# Patient Record
Sex: FEMALE | Race: WHITE | Employment: OTHER
[De-identification: names, ages, dates, MRNs, and addresses within clinical notes are randomized per-mention and may not be internally consistent; named-entity substitution may affect disease eponyms.]

---

## 2017-09-03 ENCOUNTER — HEALTH MAINTENANCE LETTER (OUTPATIENT)
Age: 57
End: 2017-09-03

## 2019-03-06 ENCOUNTER — HOSPITAL ENCOUNTER (EMERGENCY)
Facility: OTHER | Age: 59
Discharge: HOME | End: 2019-03-06
Attending: FAMILY MEDICINE | Admitting: FAMILY MEDICINE
Payer: COMMERCIAL

## 2019-03-06 VITALS
RESPIRATION RATE: 14 BRPM | OXYGEN SATURATION: 97 % | DIASTOLIC BLOOD PRESSURE: 65 MMHG | BODY MASS INDEX: 23.54 KG/M2 | HEART RATE: 90 BPM | TEMPERATURE: 97.6 F | HEIGHT: 67 IN | WEIGHT: 150 LBS | SYSTOLIC BLOOD PRESSURE: 128 MMHG

## 2019-03-06 DIAGNOSIS — R73.9 HYPERGLYCEMIA: ICD-10-CM

## 2019-03-06 LAB
ALBUMIN SERPL-MCNC: 4.1 G/DL (ref 3.5–5.7)
ALBUMIN UR-MCNC: NEGATIVE MG/DL
ALP SERPL-CCNC: 116 U/L (ref 34–104)
ALT SERPL W P-5'-P-CCNC: 56 U/L (ref 7–52)
ANION GAP SERPL CALCULATED.3IONS-SCNC: 4 MMOL/L (ref 3–14)
APPEARANCE UR: CLEAR
AST SERPL W P-5'-P-CCNC: 40 U/L (ref 13–39)
BASOPHILS # BLD AUTO: 0 10E9/L (ref 0–0.2)
BASOPHILS NFR BLD AUTO: 0.5 %
BILIRUB SERPL-MCNC: 0.9 MG/DL (ref 0.3–1)
BILIRUB UR QL STRIP: NEGATIVE
BUN SERPL-MCNC: 26 MG/DL (ref 7–25)
CALCIUM SERPL-MCNC: 9.1 MG/DL (ref 8.6–10.3)
CHLORIDE SERPL-SCNC: 99 MMOL/L (ref 98–107)
CO2 SERPL-SCNC: 26 MMOL/L (ref 21–31)
COLOR UR AUTO: YELLOW
CREAT SERPL-MCNC: 1.08 MG/DL (ref 0.6–1.2)
DIFFERENTIAL METHOD BLD: NORMAL
EOSINOPHIL # BLD AUTO: 0 10E9/L (ref 0–0.7)
EOSINOPHIL NFR BLD AUTO: 0.4 %
ERYTHROCYTE [DISTWIDTH] IN BLOOD BY AUTOMATED COUNT: 12.3 % (ref 10–15)
GFR SERPL CREATININE-BSD FRML MDRD: 52 ML/MIN/{1.73_M2}
GLUCOSE SERPL-MCNC: 747 MG/DL (ref 70–105)
GLUCOSE UR STRIP-MCNC: >1000 MG/DL
HCO3 BLDV-SCNC: 24 MMOL/L (ref 21–28)
HCT VFR BLD AUTO: 35.9 % (ref 35–47)
HGB BLD-MCNC: 12.5 G/DL (ref 11.7–15.7)
HGB UR QL STRIP: ABNORMAL
IMM GRANULOCYTES # BLD: 0 10E9/L (ref 0–0.4)
IMM GRANULOCYTES NFR BLD: 0.3 %
KETONES UR STRIP-MCNC: 15 MG/DL
LEUKOCYTE ESTERASE UR QL STRIP: NEGATIVE
LYMPHOCYTES # BLD AUTO: 1.3 10E9/L (ref 0.8–5.3)
LYMPHOCYTES NFR BLD AUTO: 16.8 %
MCH RBC QN AUTO: 32.1 PG (ref 26.5–33)
MCHC RBC AUTO-ENTMCNC: 34.8 G/DL (ref 31.5–36.5)
MCV RBC AUTO: 92 FL (ref 78–100)
MONOCYTES # BLD AUTO: 0.5 10E9/L (ref 0–1.3)
MONOCYTES NFR BLD AUTO: 6.6 %
NEUTROPHILS # BLD AUTO: 5.7 10E9/L (ref 1.6–8.3)
NEUTROPHILS NFR BLD AUTO: 75.4 %
NITRATE UR QL: NEGATIVE
O2/TOTAL GAS SETTING VFR VENT: ABNORMAL %
OXYHGB MFR BLDV: 86 %
PCO2 BLDV: 39 MM HG (ref 40–50)
PH BLDV: 7.41 PH (ref 7.32–7.43)
PH UR STRIP: 6 PH (ref 5–9)
PLATELET # BLD AUTO: 194 10E9/L (ref 150–450)
PO2 BLDV: 71 MM HG (ref 25–47)
POTASSIUM SERPL-SCNC: 4.9 MMOL/L (ref 3.5–5.1)
PROT SERPL-MCNC: 6.7 G/DL (ref 6.4–8.9)
RBC # BLD AUTO: 3.9 10E12/L (ref 3.8–5.2)
RBC #/AREA URNS AUTO: NORMAL /HPF
SODIUM SERPL-SCNC: 129 MMOL/L (ref 134–144)
SOURCE: ABNORMAL
SP GR UR STRIP: 1.01 (ref 1–1.03)
UROBILINOGEN UR STRIP-ACNC: 0.2 EU/DL (ref 0.2–1)
WBC # BLD AUTO: 7.5 10E9/L (ref 4–11)
WBC #/AREA URNS AUTO: NORMAL /HPF

## 2019-03-06 PROCEDURE — 85025 COMPLETE CBC W/AUTO DIFF WBC: CPT | Performed by: FAMILY MEDICINE

## 2019-03-06 PROCEDURE — 36415 COLL VENOUS BLD VENIPUNCTURE: CPT | Performed by: FAMILY MEDICINE

## 2019-03-06 PROCEDURE — 25000128 H RX IP 250 OP 636: Performed by: FAMILY MEDICINE

## 2019-03-06 PROCEDURE — 25800030 ZZH RX IP 258 OP 636: Performed by: FAMILY MEDICINE

## 2019-03-06 PROCEDURE — 99283 EMERGENCY DEPT VISIT LOW MDM: CPT | Mod: Z6 | Performed by: FAMILY MEDICINE

## 2019-03-06 PROCEDURE — 81001 URINALYSIS AUTO W/SCOPE: CPT | Performed by: FAMILY MEDICINE

## 2019-03-06 PROCEDURE — 99283 EMERGENCY DEPT VISIT LOW MDM: CPT | Performed by: FAMILY MEDICINE

## 2019-03-06 PROCEDURE — 82805 BLOOD GASES W/O2 SATURATION: CPT | Performed by: FAMILY MEDICINE

## 2019-03-06 PROCEDURE — 99284 EMERGENCY DEPT VISIT MOD MDM: CPT | Mod: 25 | Performed by: FAMILY MEDICINE

## 2019-03-06 PROCEDURE — 96374 THER/PROPH/DIAG INJ IV PUSH: CPT | Performed by: FAMILY MEDICINE

## 2019-03-06 PROCEDURE — 80053 COMPREHEN METABOLIC PANEL: CPT | Performed by: FAMILY MEDICINE

## 2019-03-06 PROCEDURE — 25000131 ZZH RX MED GY IP 250 OP 636 PS 637: Performed by: FAMILY MEDICINE

## 2019-03-06 RX ORDER — FERROUS SULFATE 325(65) MG
325 TABLET ORAL 2 TIMES DAILY
COMMUNITY

## 2019-03-06 RX ORDER — CALCIUM CARBONATE 500 MG/1
1 TABLET, CHEWABLE ORAL 2 TIMES DAILY
COMMUNITY
End: 2021-01-11

## 2019-03-06 RX ORDER — OMEPRAZOLE 40 MG/1
40 CAPSULE, DELAYED RELEASE ORAL 2 TIMES DAILY
COMMUNITY
End: 2021-01-11

## 2019-03-06 RX ORDER — SODIUM CHLORIDE 9 MG/ML
1000 INJECTION, SOLUTION INTRAVENOUS CONTINUOUS
Status: DISCONTINUED | OUTPATIENT
Start: 2019-03-06 | End: 2019-03-06 | Stop reason: HOSPADM

## 2019-03-06 RX ORDER — DEXTROSE MONOHYDRATE 25 G/50ML
25-50 INJECTION, SOLUTION INTRAVENOUS
Status: DISCONTINUED | OUTPATIENT
Start: 2019-03-06 | End: 2019-03-06 | Stop reason: HOSPADM

## 2019-03-06 RX ORDER — NICOTINE POLACRILEX 4 MG
15-30 LOZENGE BUCCAL
Status: DISCONTINUED | OUTPATIENT
Start: 2019-03-06 | End: 2019-03-06 | Stop reason: HOSPADM

## 2019-03-06 RX ORDER — QUETIAPINE FUMARATE 25 MG/1
25 TABLET, FILM COATED ORAL 2 TIMES DAILY
COMMUNITY
End: 2021-01-11 | Stop reason: DRUGHIGH

## 2019-03-06 RX ORDER — MIRTAZAPINE 15 MG/1
7.5 TABLET, FILM COATED ORAL AT BEDTIME
COMMUNITY
End: 2021-01-11 | Stop reason: DRUGHIGH

## 2019-03-06 RX ORDER — CHOLECALCIFEROL (VITAMIN D3) 50 MCG
1 TABLET ORAL DAILY
COMMUNITY

## 2019-03-06 RX ORDER — LEVOTHYROXINE SODIUM 137 UG/1
137 TABLET ORAL DAILY
COMMUNITY
End: 2020-12-22

## 2019-03-06 RX ADMIN — SODIUM CHLORIDE 1000 ML: 9 INJECTION, SOLUTION INTRAVENOUS at 14:11

## 2019-03-06 RX ADMIN — INSULIN HUMAN 15 UNITS: 100 INJECTION, SOLUTION PARENTERAL at 14:25

## 2019-03-06 RX ADMIN — SODIUM CHLORIDE 1000 ML: 900 INJECTION, SOLUTION INTRAVENOUS at 15:17

## 2019-03-06 ASSESSMENT — MIFFLIN-ST. JEOR: SCORE: 1298.03

## 2019-03-06 NOTE — ED NOTES
Pt states that she is feeling better, VSS, pt back to detox, vebalizes understanding of discharge instructions

## 2019-03-06 NOTE — ED AVS SNAPSHOT
[facility]  [address]  [place] [address]  Phone:  [phone]  Fax:  [phone]                                    Funmilayo Stratton   MRN: [id number]    Department:  [facility]   Date of Visit:  3/6/2019           After Visit Summary Signature Page    I have received my discharge instructions, and my questions have been answered. I have discussed any challenges I see with this plan with the nurse or doctor.    ..........................................................................................................................................  Patient/Patient Representative Signature      ..........................................................................................................................................  Patient Representative Print Name and Relationship to Patient    ..................................................               ................................................  Date                                   Time    ..........................................................................................................................................  Reviewed by Signature/Title    ...................................................              ..............................................  Date                                               Time          22EPIC Rev 08/18

## 2019-03-06 NOTE — ED PROVIDER NOTES
History     Chief Complaint   Patient presents with     Hyperglycemia     HPI  Funmilayo Stratton is a 59 year old female who just went to outpatient detox at [facility] yesterday.      She takes daily Lantus and then a sliding scale depending on her carbohydrate loads throughout the day, but states that she has been 'busy' at detox and hasn't been compliant with her insulin today.    Staff noted a BS of over 500, and she was sent here.    She doesn't feel particularly bad, is somewhat thirsty, but no increased respirations, nausea, vomiting, etc.    She hasn't been otherwise unwell.    Allergies:  Allergies   Allergen Reactions     Sulfa Drugs Rash     Vicodin [Hydrocodone-Acetaminophen] Rash       Problem List:    Patient Active Problem List    Diagnosis Date Noted     HYPERLIPIDEMIA LDL GOAL <100 05/09/2010     Priority: Medium     TYPE 2 DIABETES, HBA1C GOAL < 7% 05/02/2010     Priority: Medium     Vitamin D deficiency 09/21/2008     Priority: Medium     (Problem list name updated by automated process. Provider to review and confirm.)       Allergic rhinitis 05/06/2008     Priority: Medium     Problem list name updated by automated process. Provider to review       Esophageal reflux 01/21/2008     Priority: Medium     Tobacco use disorder 11/15/2007     Priority: Medium     Pure hypercholesterolemia 10/17/2006     Priority: Medium     Episodic mood disorder (H) 09/05/2006     Priority: Medium     Problem list name updated by automated process. Provider to review       Diabetes mellitus, type 2 (H) 06/14/2006     Priority: Medium     Problem list name updated by automated process. Provider to review       Essential hypertension 06/14/2006     Priority: Medium     Problem list name updated by automated process. Provider to review       Hypothyroidism 06/14/2006     Priority: Medium     Problem list name updated by automated process. Provider to review          Past Medical History:    Past Medical History:  "  Diagnosis Date     DIABETES MELLITUS TYPE II-UNCOMPL 6/14/2006     HYPERTENSION NOS 6/14/2006     HYPOTHYROIDISM NOS 6/14/2006     Vitamin D deficiencies 9/21/2008       Past Surgical History:    History reviewed. No pertinent surgical history.    Family History:    Family History   Problem Relation Age of Onset     Hypertension Mother      Diabetes Mother      Asthma Father      Diabetes Father      Hypertension Father      Cerebrovascular Disease Father      Circulatory Father      Eye Disorder Father        Social History:  Marital Status:   [4]  Social History     Tobacco Use     Smoking status: Current Every Day Smoker     Packs/day: 1.00     Last attempt to quit: 8/18/2008     Years since quitting: 10.5     Smokeless tobacco: Never Used   Substance Use Topics     Alcohol use: Yes     Comment: 1 liter of whiskey per day, last drank at 1100 3/5/2019     Drug use: No        Medications:      ACYCLOVIR 400 MG OR TABS   ASPIRIN 81 MG OR TABS   calcium carbonate (TUMS) 500 MG chewable tablet   CLARITIN-D 24 HOUR#  MG OR TB24   ferrous sulfate (FEROSUL) 325 (65 Fe) MG tablet   fluticasone (VERAMYST) 27.5 MCG/SPRAY nasal spray   LANTUS SOLOSTAR 100 UNIT/ML SC SOLN   levothyroxine (SYNTHROID/LEVOTHROID) 137 MCG tablet   LISINOPRIL 10 MG OR TABS   mirtazapine (REMERON) 15 MG tablet   MULTIVITAMIN/IRON OR TABS   NOVOLOG FLEXPEN 100 UNIT/ML SC SOLN   omeprazole (PRILOSEC) 40 MG DR capsule   QUEtiapine (SEROQUEL) 25 MG tablet   vitamin D3 (CHOLECALCIFEROL) 2000 units tablet   BD U/F III SHORT PEN NEEDLE 31G X 8 MM MISC   ONE TOUCH ULTRA BONUS PACK STRP   VI   ONETOUCH ULTRASOFT LANCETS MISC         Review of Systems  Denies recent fevers, cough, runny nose, abdominal concerns  Physical Exam   BP: 175/76  Pulse: 87  Heart Rate: 82  Temp: 97.6  F (36.4  C)  Resp: 12  Height: 170.2 cm (5' 7\")  Weight: 68 kg (150 lb)  SpO2: 98 %      Physical Examwell woman.  mentates clearly.  Does not look toxic or septic.  " OP slightly dry but not overwhelmingly so.  Heart reg.  No tachycardia.  Lungs clear.  abd soft, NT, ND.    .  However, her VBG is very reassuring.  She does not appear acutely ill regarding her hyperglycemia.    She is given one liter of NS and 15 units of regular insulin, and then her BS was down to 410.  I feel she does not need admission, as she has no constitutional symptoms, and can be managed at [facility] by adhering to her usual daily insulin regimen.  She understands she will have to use her insulin as directed and follow her sliding scale.  I feel she is mentally capable of doing so.    Return to the ER with consistently higher Blood sugars, or developing constitutional symptoms.    ED Course        Procedures               Critical Care time:  none               Results for orders placed or performed during the hospital encounter of 03/06/19 (from the past 24 hour(s))   *UA reflex to Microscopic   Result Value Ref Range    Color Urine Yellow     Appearance Urine Clear     Glucose Urine >1000 (A) NEG^Negative mg/dL    Bilirubin Urine Negative NEG^Negative    Ketones Urine 15 (A) NEG^Negative mg/dL    Specific Gravity Urine 1.010 1.000 - 1.030    Blood Urine Trace (A) NEG^Negative    pH Urine 6.0 5.0 - 9.0 pH    Protein Albumin Urine Negative NEG^Negative mg/dL    Urobilinogen Urine 0.2 0.2 - 1.0 EU/dL    Nitrite Urine Negative NEG^Negative    Leukocyte Esterase Urine Negative NEG^Negative    Source Midstream Urine    Urine Microscopic   Result Value Ref Range    WBC Urine 0 - 5 OTO5^0 - 5 /HPF    RBC Urine O - 2 OTO2^O - 2 /HPF   CBC with platelets differential   Result Value Ref Range    WBC 7.5 4.0 - 11.0 10e9/L    RBC Count 3.90 3.8 - 5.2 10e12/L    Hemoglobin 12.5 11.7 - 15.7 g/dL    Hematocrit 35.9 35.0 - 47.0 %    MCV 92 78 - 100 fl    MCH 32.1 26.5 - 33.0 pg    MCHC 34.8 31.5 - 36.5 g/dL    RDW 12.3 10.0 - 15.0 %    Platelet Count 194 150 - 450 10e9/L    Diff Method Automated Method     %  Neutrophils 75.4 %    % Lymphocytes 16.8 %    % Monocytes 6.6 %    % Eosinophils 0.4 %    % Basophils 0.5 %    % Immature Granulocytes 0.3 %    Absolute Neutrophil 5.7 1.6 - 8.3 10e9/L    Absolute Lymphocytes 1.3 0.8 - 5.3 10e9/L    Absolute Monocytes 0.5 0.0 - 1.3 10e9/L    Absolute Eosinophils 0.0 0.0 - 0.7 10e9/L    Absolute Basophils 0.0 0.0 - 0.2 10e9/L    Abs Immature Granulocytes 0.0 0 - 0.4 10e9/L   Comprehensive metabolic panel   Result Value Ref Range    Sodium 129 (L) 134 - 144 mmol/L    Potassium 4.9 3.5 - 5.1 mmol/L    Chloride 99 98 - 107 mmol/L    Carbon Dioxide 26 21 - 31 mmol/L    Anion Gap 4 3 - 14 mmol/L    Glucose 747 (HH) 70 - 105 mg/dL    Urea Nitrogen 26 (H) 7 - 25 mg/dL    Creatinine 1.08 0.60 - 1.20 mg/dL    GFR Estimate 52 (L) >60 mL/min/[1.73_m2]    GFR Estimate If Black 63 >60 mL/min/[1.73_m2]    Calcium 9.1 8.6 - 10.3 mg/dL    Bilirubin Total 0.9 0.3 - 1.0 mg/dL    Albumin 4.1 3.5 - 5.7 g/dL    Protein Total 6.7 6.4 - 8.9 g/dL    Alkaline Phosphatase 116 (H) 34 - 104 U/L    ALT 56 (H) 7 - 52 U/L    AST 40 (H) 13 - 39 U/L   Blood gas venous and oxyhgb   Result Value Ref Range    Ph Venous 7.41 7.32 - 7.43 pH    PCO2 Venous 39 (L) 40 - 50 mm Hg    PO2 Venous 71 (H) 25 - 47 mm Hg    Bicarbonate Venous 24 21 - 28 mmol/L    FIO2 Unknown     Oxyhemoglobin Venous 86 %       Medications   0.9% sodium chloride BOLUS (0 mLs Intravenous Stopped 3/6/19 1517)     Followed by   sodium chloride 0.9% infusion (1,000 mLs Intravenous New Bag 3/6/19 1517)   glucose gel 15-30 g (not administered)     Or   dextrose 50 % injection 25-50 mL (not administered)     Or   glucagon injection 1 mg (not administered)   insulin (regular) (HumuLIN R/NovoLIN R) injection 15 Units (15 Units Intravenous Given 3/6/19 7664)       Assessments & Plan (with Medical Decision Making)     I have reviewed the nursing notes.    I have reviewed the findings, diagnosis, plan and need for follow up with the patient.   as  above    [facility] wanted some guidelines for sliding scale.  I wrote down the standard medium resistance dosing with Novolog.  They should address further questions to Funmilayo's primary doctor's clinic for further instructions.     Discharge BS was 290.        Medication List      There are no discharge medications for this visit.         Final diagnoses:   Hyperglycemia       3/6/2019   [facility]     Surendra Flores MD  03/06/19 1532       Surendra Flores MD  03/06/19 1544       Surendra Flores MD  03/06/19 1543

## 2019-03-06 NOTE — ED TRIAGE NOTES
"ED Nursing Triage Note (General)   ________________________________    Funmilayo Stratton is a 59 year old Female that presents to triage private car  With history of  Admitted to [facility] last evening for ETOH detox, is diabetic and blood sugars are reading HIGH on her monitor, here her BG is greater than 500, denies pain and feels her levels are due to ETOH, last drink was yesterday at 1100, usually drinks a Liter of whiskey per day, reported by patient   Significant symptoms had onset today.   /76   Pulse 87   Temp 97.6  F (36.4  C) (Tympanic)   Resp 12   Ht 1.702 m (5' 7\")   Wt 68 kg (150 lb)   LMP 06/26/2008   SpO2 98%   Breastfeeding? No   BMI 23.49 kg/m  t  Patient appears alert , in no acute distress., and cooperative behavior.    GCS 14  Airway: intact  Breathing noted as Normal.  Circulation Normal  Skin pale and cool  Action taken:  Triage to critical care immediately      [facility] lives with dtr in [place], but is an inpt at [facility] for ETOH detox.  "

## 2019-06-21 ENCOUNTER — TRANSFERRED RECORDS (OUTPATIENT)
Dept: MULTI SPECIALTY CLINIC | Facility: CLINIC | Age: 59
End: 2019-06-21
Payer: COMMERCIAL

## 2019-11-07 ENCOUNTER — HEALTH MAINTENANCE LETTER (OUTPATIENT)
Age: 59
End: 2019-11-07

## 2020-02-23 ENCOUNTER — HEALTH MAINTENANCE LETTER (OUTPATIENT)
Age: 60
End: 2020-02-23

## 2020-11-17 ENCOUNTER — TELEPHONE (OUTPATIENT)
Dept: INTERNAL MEDICINE | Facility: OTHER | Age: 60
End: 2020-11-17

## 2020-11-17 NOTE — TELEPHONE ENCOUNTER
Called Krystina at [facility] and verified name and date of birth of patient.     Most recent blood glucose was 210 at 2:45 pm today. Notified the nurse that Dr. Cee has never seen the patient. They report they will continue to monitor.  Laurie Ingram LPN on 11/17/2020 at 2:51 PM

## 2020-11-17 NOTE — TELEPHONE ENCOUNTER
Krystina from [facility] calling and states that patients Blood sugar today at 7 am : 435 and 11 am : 465    Krystina states that patient is taking Basaglar 4 units every AM and Novolog just sliding scale which was given.    No oral diabetic medications.    Krystina denies patient having any symptoms.  Krystina looking for direction how to proceed with blood sugar.    Liza Chi RN on 11/17/2020 at 11:17 AM

## 2020-11-19 ENCOUNTER — NURSING HOME VISIT (OUTPATIENT)
Dept: GERIATRICS | Facility: OTHER | Age: 60
End: 2020-11-19
Attending: NURSE PRACTITIONER
Payer: MEDICAID

## 2020-11-19 DIAGNOSIS — J18.9 PNEUMONIA OF LEFT LOWER LOBE DUE TO INFECTIOUS ORGANISM: Primary | ICD-10-CM

## 2020-11-19 PROCEDURE — 99310 SBSQ NF CARE HIGH MDM 45: CPT | Performed by: NURSE PRACTITIONER

## 2020-11-23 ENCOUNTER — RESULTS ONLY (OUTPATIENT)
Dept: LAB | Age: 60
End: 2020-11-23

## 2020-11-23 ENCOUNTER — MEDICAL CORRESPONDENCE (OUTPATIENT)
Dept: HEALTH INFORMATION MANAGEMENT | Facility: OTHER | Age: 60
End: 2020-11-23

## 2020-11-23 ENCOUNTER — NURSING HOME VISIT (OUTPATIENT)
Dept: GERIATRICS | Facility: OTHER | Age: 60
End: 2020-11-23
Attending: NURSE PRACTITIONER
Payer: MEDICAID

## 2020-11-23 ENCOUNTER — NURSE TRIAGE (OUTPATIENT)
Dept: INTERNAL MEDICINE | Facility: OTHER | Age: 60
End: 2020-11-23

## 2020-11-23 DIAGNOSIS — R73.9 HYPERGLYCEMIA: Primary | ICD-10-CM

## 2020-11-23 PROCEDURE — 99309 SBSQ NF CARE MODERATE MDM 30: CPT | Performed by: NURSE PRACTITIONER

## 2020-11-23 NOTE — TELEPHONE ENCOUNTER
"Called Lissa and reported per last phone not on 11/17/2020 per Dr. Cee \" I Have never seen patient\"    Krystina at [facility] was notified.  Lissa will contact patients PCP .    Liza Chi RN on 11/23/2020 at 8:25 AM    "

## 2020-11-23 NOTE — PROGRESS NOTES
Visit Date:   11/19/2020      CHIEF COMPLAINT:  Initial nurse practitioner evaluation.      HISTORY OF PRESENT ILLNESS:  The patient was admitted to skilled nursing facility on 11/13 from [facility] in [place], [place].  Her hospital stay was from 11/04 through 11/13.  She was admitted with nausea, weakness, pressure ulcers of both heels and an infected diabetic ulcer on the dorsal surface of the right foot.  She was found to have severe hyperglycemia with a glucose greater than 500 that was thought secondary to dehydration.  She had left lower lobe pneumonia and also found to have a macrocytic anemia.  Laboratory studies during hospitalization, as reported on discharge summary, included a normal B12 and folate level, increased ferritin thought to be reactive, low iron and low TIBC.  Hemoccults were presumptive positive according to note, peripheral smear was pending at that time.  She was treated with IV antibiotics and also switched to oral Flagyl and Levaquin.  These ended by the time she discharged.  Her sugars were stable with insulin, but she did need some down adjustments due to hypoglycemia.  She has history of Gabbie-en-Y gastric bypass about 10 years ago and is followed by Dr. Gallo in [place].  She tells me that she saw him this past summer and had exploratory surgery which found a hernia and also some scar tissue.  She was not aware of any anemia issues.  She cannot recall the last time she had EGD or colonoscopy.  At the time of discharge, it was recommended that the patient have skilled nursing facility care to help with strengthening, wound care and should consider outpatient endoscopy to evaluate the anemia.  The patient tells me today that she is planning to follow with Dr. Abhinav Jones in [place] who she recently established care.  She plans to follow up with Dr. Jones after skilled nursing facility discharge.        The patient tells me that she is feeling better.  She is less short of  breath.  She still has chronic swelling in her legs.  She had that prior to hospital admission and was treated with Lasix.  She thinks the wounds on her heels and the top of her right foot are improving.  The infection in the foot has resolved.  She is hoping to get stronger and to discharge to an assisted living rather than back to her home where her daughter had been her PCA.      PAST MEDICAL HISTORY, PAST SURGICAL HISTORY, ALLERGIES, MEDICATIONS, RISK FACTORS, SOCIAL HISTORY:  All reviewed.      ADVANCE DIRECTIVE:  Do not resuscitate.      REVIEW OF SYSTEMS:  A 12-point complete review of systems discussed with nursing staff and resident.  See HPI for positive findings, otherwise unremarkable.      PHYSICAL EXAMINATION:   VITAL SIGNS:  Blood pressure 139/79, pulse 67, respiratory rate 18, temperature 98.1, SPO2 of 98% on room air.   Sugars range from 115-255.  She did have an outlier of 465 blood sugar, but that was because she could not sleep at night and was up snacking and getting food out of the vending machine.   GENERAL:  Pleasant female in no acute distress.  Affect normal.  Alert and oriented x 4.   SKIN:  Color pink.   HEENT:  Mucous membranes moist.   NECK:  Supple without adenopathy.   LUNGS:  Fields with faint wheeze at the right base, otherwise clear.   CARDIOVASCULAR:  Regular rate and rhythm with no murmur or S3 auscultated.   ABDOMEN:  Soft and without masses, tenderness and organomegaly.   EXTREMITIES:  Bilateral extremities with 2+ edema to above her knees.  She has a wound at the dorsal surface of the right foot that has 75% yellow slough and 25% light pink granulation tissue.  Periwound tissue is without erythema, warmth or maceration.  There is a small amount of drainage from the wound that is not odorous and nonpurulent.  There is no surrounding erythema, warmth or maceration.  There is a small amount of nonpurulent serosanguineous drainage from each wound.  Bilateral heel ulcers, both are  covered with yellow slough.  Measurements of all these wounds have been obtained by nursing staff and documented.  The patient also has a cut on the side of the right leg.  This is covered in yellow slough as well.  She states that this is not covered today, but it has been covered with an ABD pad since she has been here.  No evidence of infection.      LABORATORY DATA:  Emergency department labs prior to hospital admission:  Hemoglobin 9.2 g/dL, platelet 261,000.      ASSESSMENT:   1.  Left lower lobe pneumonia.   2.  Type 1 diabetes.   3.  Diabetic ulcer, right foot.   4.  Diabetic ulcer wound infection.   5.  Bilateral unstageable pressure ulcers.   6.  Right lateral calf traumatic ulcer.   7.  History of Gabbie-en-Y bypass.   8.  Macrocytic anemia.   9.  Hypothyroidism.      PLAN:  We will follow-up on some labs to check CBC, TSH, free T4, basic metabolic panel next lab day.  As long as things are stable will hold off on getting any panendoscopy until she has discharged from skilled nursing facility.  She was to follow with Dr. Gallo as he has managed her gastric issues in the past.         HARRIET QUEZADA NP             D: 2020   T: 2020   MT: CARRILLO      Name:     GAEL BARRIENTOS   MRN:      [id number]        Account:      [id number]   :      1960           Visit Date:   2020      Document: [id number]       cc: Maximiliano Cee MD       [facility]

## 2020-11-25 NOTE — PROGRESS NOTES
Visit Date:   11/23/2020      CHIEF COMPLAINT:  Increased redness, diabetic foot ulcer, and increased blood sugars.      HISTORY OF PRESENT ILLNESS:  The patient has had blood sugars ranging from 115-450.  She currently is on insulin Basaglar 4 units at 7:00 a.m. and sliding scale insulin.  The sliding scale insulin ranges from 150-350 and gets between 2 and 10 units depending upon the range.  It is recommended that MD be notified with blood sugar is greater than 350.  They have been needing to do this.  The patient tells me that she has been up snacking at nighttime.  By reviewing her hospital discharge summary, the hospitalist had reported in her note that patient will be discharged on insulin Basaglar 4 units in the morning and 2 units at bedtime.  She tells me that her usual dose at home was 4 units twice daily.  She actually is only on insulin Basaglar 4 units in the morning.  She also is concerned because she is having more pain in both of her feet.  She has been wearing Ace wraps to control the edema.  She has ulcers on both heels from pressure related injuries that are healing nicely.  She also has a traumatic ulcer on the side of her right leg that is now healing.  Nursing staff are concerned because there is more redness around the diabetic ulcer along the top of her right foot.  The patient states this has been more painful.  She is prescribed oxycodone 5 mg every 6 hours, but by reviewing her medication list has actually been taking this a couple of times daily for the past 3 days.  She states that she is having increased pain and would like something stronger for pain.  While hospitalized, she was treated for pneumonia.  I am not seeing any evidence of wound infections.  SHE DOES HAVE AN ALLERGY TO BACTRIM.  She has been afebrile.      PAST MEDICAL HISTORY, ALLERGIES, MEDICATIONS:  Reviewed.      REVIEW OF SYSTEMS:  See HPI.      PHYSICAL EXAMINATION:   VITAL SIGNS:   Blood pressure 110/62, pulse 90,  respiratory rate 18, temperature 97.1.     GENERAL:  Pleasant female who is sitting on the edge of her bed.  She did need assistance to lie down in bed.  She answers questions appropriately.  She seems to be more depressed today.  She is followed by Psychiatry.  She does have bipolar disorder.   LUNGS:  Fields clear to auscultation.   CARDIOVASCULAR:  Regular.   EXTREMITIES:  Bilateral extremities with 1-2+ edema.  DP and PT are palpable bilaterally.  The ulceration along the dorsal surface of the right foot does show improvement of the wound bed and there is less slough and more granulation tissue, but there is more erythema along the base of the wound.  There is no warmth with palpation to this area.  She also has a small new blister along the medial aspect of the great toe that has not ruptured.      ASSESSMENT:   1.  Type 1 diabetes.   2.  Diabetic foot ulcer, right foot.   3.  Hyperglycemia.   4.  Cellulitis, right foot.      PLAN:   1.  We will increase her insulin glargine to 4 units in the morning and 2 units at bedtime.  We will change her sliding scale to give insulin aspart 150-199 give 2 units, 200-249 give 4 units, 250-299 give 6 units, 300-349 give 8 units, 350-400 give 10 units, greater than 401 give 12 units and update MD or NP.  We will also have staff cleanse the wound on the right foot and obtain a wound culture.  They will continue with same wound orders for all dressing changes for the time being.  We will also start her on Keflex 500 mg 3 times daily for 7 days for treatment of the right foot cellulitis.  She is also encouraged to continue to wear the Ace wraps for edema management and elevate legs above her heart.  I did not change her oxycodone, instead I have asked nursing staff to please give her the medication more frequently as she has only been getting this twice daily and could receive up to 4 times daily.         HARRIET QUEZADA NP             D: 11/23/2020   T: 11/23/2020   MT:  LR      Name:     GAEL BARRIENTOS   MRN:      [id number]        Account:      [id number]   :      1960           Visit Date:   2020      Document: [id number]       cc: Maximiliano Grimes [facility]

## 2020-11-26 LAB
BACTERIA SPEC CULT: ABNORMAL
SPECIMEN SOURCE: ABNORMAL

## 2020-11-27 ENCOUNTER — TELEPHONE (OUTPATIENT)
Dept: INTERNAL MEDICINE | Facility: OTHER | Age: 60
End: 2020-11-27

## 2020-11-27 DIAGNOSIS — T14.8XXA WOUND INFECTION: Primary | ICD-10-CM

## 2020-11-27 DIAGNOSIS — L08.9 WOUND INFECTION: Primary | ICD-10-CM

## 2020-11-27 RX ORDER — DOXYCYCLINE HYCLATE 100 MG
100 TABLET ORAL 2 TIMES DAILY
Qty: 14 TABLET | Refills: 0 | Status: SHIPPED | OUTPATIENT
Start: 2020-11-27 | End: 2020-12-04

## 2020-11-27 NOTE — TELEPHONE ENCOUNTER
Please place orders regarding wound culture and send to pharmacy. Caryl Gonzalez LPN .............11/27/2020  2:18 PM

## 2020-11-29 ENCOUNTER — HEALTH MAINTENANCE LETTER (OUTPATIENT)
Age: 60
End: 2020-11-29

## 2020-12-01 ENCOUNTER — OFFICE VISIT (OUTPATIENT)
Dept: PEDIATRICS | Facility: OTHER | Age: 60
End: 2020-12-01
Attending: INTERNAL MEDICINE
Payer: MEDICAID

## 2020-12-01 VITALS
WEIGHT: 155 LBS | DIASTOLIC BLOOD PRESSURE: 86 MMHG | SYSTOLIC BLOOD PRESSURE: 122 MMHG | BODY MASS INDEX: 24.28 KG/M2 | HEART RATE: 87 BPM | TEMPERATURE: 98 F | OXYGEN SATURATION: 98 % | RESPIRATION RATE: 18 BRPM

## 2020-12-01 DIAGNOSIS — I10 ESSENTIAL HYPERTENSION: ICD-10-CM

## 2020-12-01 DIAGNOSIS — E78.00 PURE HYPERCHOLESTEROLEMIA: ICD-10-CM

## 2020-12-01 DIAGNOSIS — L97.429 DIABETIC ULCER OF LEFT HEEL ASSOCIATED WITH TYPE 2 DIABETES MELLITUS, UNSPECIFIED ULCER STAGE (H): Primary | ICD-10-CM

## 2020-12-01 DIAGNOSIS — I87.8 VENOUS STASIS OF BOTH LOWER EXTREMITIES: ICD-10-CM

## 2020-12-01 DIAGNOSIS — E46 MALNUTRITION, UNSPECIFIED TYPE (H): ICD-10-CM

## 2020-12-01 DIAGNOSIS — Z98.84 S/P GASTRIC BYPASS: ICD-10-CM

## 2020-12-01 DIAGNOSIS — E03.9 HYPOTHYROIDISM, UNSPECIFIED TYPE: ICD-10-CM

## 2020-12-01 DIAGNOSIS — Z79.4 CONTROLLED TYPE 2 DIABETES MELLITUS WITH COMPLICATION, WITH LONG-TERM CURRENT USE OF INSULIN (H): ICD-10-CM

## 2020-12-01 DIAGNOSIS — E88.09 HYPOALBUMINEMIA: ICD-10-CM

## 2020-12-01 DIAGNOSIS — E55.9 VITAMIN D DEFICIENCY: ICD-10-CM

## 2020-12-01 DIAGNOSIS — E11.8 CONTROLLED TYPE 2 DIABETES MELLITUS WITH COMPLICATION, WITH LONG-TERM CURRENT USE OF INSULIN (H): ICD-10-CM

## 2020-12-01 DIAGNOSIS — E11.621 DIABETIC ULCER OF LEFT HEEL ASSOCIATED WITH TYPE 2 DIABETES MELLITUS, UNSPECIFIED ULCER STAGE (H): Primary | ICD-10-CM

## 2020-12-01 DIAGNOSIS — F17.200 TOBACCO USE DISORDER: ICD-10-CM

## 2020-12-01 LAB
ALBUMIN SERPL-MCNC: 2.3 G/DL (ref 3.5–5.7)
ALBUMIN UR-MCNC: NORMAL MG/DL
ALP SERPL-CCNC: 126 U/L (ref 34–104)
ALT SERPL W P-5'-P-CCNC: 83 U/L (ref 7–52)
ANION GAP SERPL CALCULATED.3IONS-SCNC: 5 MMOL/L (ref 3–14)
APPEARANCE UR: NORMAL
AST SERPL W P-5'-P-CCNC: 113 U/L (ref 13–39)
BASOPHILS # BLD AUTO: 0 10E9/L (ref 0–0.2)
BASOPHILS NFR BLD AUTO: 0.8 %
BILIRUB SERPL-MCNC: 0.3 MG/DL (ref 0.3–1)
BILIRUB UR QL STRIP: NORMAL
BUN SERPL-MCNC: 20 MG/DL (ref 7–25)
CALCIUM SERPL-MCNC: 8 MG/DL (ref 8.6–10.3)
CHLORIDE SERPL-SCNC: 107 MMOL/L (ref 98–107)
CHOLEST SERPL-MCNC: 120 MG/DL
CO2 SERPL-SCNC: 25 MMOL/L (ref 21–31)
COLOR UR AUTO: NORMAL
CREAT SERPL-MCNC: 0.71 MG/DL (ref 0.6–1.2)
DEPRECATED CALCIDIOL+CALCIFEROL SERPL-MC: 64.8 NG/ML
DIFFERENTIAL METHOD BLD: ABNORMAL
EOSINOPHIL # BLD AUTO: 0 10E9/L (ref 0–0.7)
EOSINOPHIL NFR BLD AUTO: 0.8 %
ERYTHROCYTE [DISTWIDTH] IN BLOOD BY AUTOMATED COUNT: 14.2 % (ref 10–15)
GFR SERPL CREATININE-BSD FRML MDRD: 85 ML/MIN/{1.73_M2}
GLUCOSE SERPL-MCNC: 146 MG/DL (ref 70–105)
GLUCOSE UR STRIP-MCNC: NORMAL MG/DL
HCT VFR BLD AUTO: 25.2 % (ref 35–47)
HDLC SERPL-MCNC: 62 MG/DL (ref 23–92)
HGB BLD-MCNC: 8.5 G/DL (ref 11.7–15.7)
HGB UR QL STRIP: NORMAL
IMM GRANULOCYTES # BLD: 0 10E9/L (ref 0–0.4)
IMM GRANULOCYTES NFR BLD: 0.3 %
IRON SATN MFR SERPL: 35 % (ref 20–55)
IRON SERPL-MCNC: 62 UG/DL (ref 50–212)
KETONES UR STRIP-MCNC: NORMAL MG/DL
LDLC SERPL CALC-MCNC: 44 MG/DL
LEUKOCYTE ESTERASE UR QL STRIP: NORMAL
LYMPHOCYTES # BLD AUTO: 1.6 10E9/L (ref 0.8–5.3)
LYMPHOCYTES NFR BLD AUTO: 43.6 %
MAGNESIUM SERPL-MCNC: 1.5 MG/DL (ref 1.9–2.7)
MCH RBC QN AUTO: 35.3 PG (ref 26.5–33)
MCHC RBC AUTO-ENTMCNC: 33.7 G/DL (ref 31.5–36.5)
MCV RBC AUTO: 105 FL (ref 78–100)
MONOCYTES # BLD AUTO: 0.3 10E9/L (ref 0–1.3)
MONOCYTES NFR BLD AUTO: 7.9 %
NEUTROPHILS # BLD AUTO: 1.7 10E9/L (ref 1.6–8.3)
NEUTROPHILS NFR BLD AUTO: 46.6 %
NITRATE UR QL: NORMAL
NONHDLC SERPL-MCNC: 58 MG/DL
PH UR STRIP: NORMAL PH (ref 5–7)
PLATELET # BLD AUTO: 284 10E9/L (ref 150–450)
POTASSIUM SERPL-SCNC: 4 MMOL/L (ref 3.5–5.1)
PREALB SERPL IA-MCNC: 14 MG/DL (ref 17–34)
PROT SERPL-MCNC: 4.8 G/DL (ref 6.4–8.9)
RBC # BLD AUTO: 2.41 10E12/L (ref 3.8–5.2)
RBC #/AREA URNS AUTO: NORMAL /HPF (ref 0–2)
SODIUM SERPL-SCNC: 137 MMOL/L (ref 134–144)
SOURCE: NORMAL
SP GR UR STRIP: NORMAL (ref 1–1.03)
TIBC SERPL-MCNC: 176.4 UG/DL (ref 245–400)
TRIGL SERPL-MCNC: 69 MG/DL
TSH SERPL DL<=0.05 MIU/L-ACNC: 19.61 IU/ML (ref 0.34–5.6)
UIBC (UNSATURATED): 114.4 MG/DL
UROBILINOGEN UR STRIP-MCNC: NORMAL MG/DL (ref 0–2)
VIT B12 SERPL-MCNC: 668 PG/ML (ref 180–914)
WBC # BLD AUTO: 3.7 10E9/L (ref 4–11)
WBC #/AREA URNS AUTO: NORMAL /HPF

## 2020-12-01 PROCEDURE — 80061 LIPID PANEL: CPT | Mod: ZL | Performed by: INTERNAL MEDICINE

## 2020-12-01 PROCEDURE — 36415 COLL VENOUS BLD VENIPUNCTURE: CPT | Mod: ZL | Performed by: INTERNAL MEDICINE

## 2020-12-01 PROCEDURE — 82607 VITAMIN B-12: CPT | Mod: ZL | Performed by: INTERNAL MEDICINE

## 2020-12-01 PROCEDURE — 82306 VITAMIN D 25 HYDROXY: CPT | Mod: ZL | Performed by: INTERNAL MEDICINE

## 2020-12-01 PROCEDURE — 99214 OFFICE O/P EST MOD 30 MIN: CPT | Performed by: INTERNAL MEDICINE

## 2020-12-01 PROCEDURE — 85025 COMPLETE CBC W/AUTO DIFF WBC: CPT | Mod: ZL | Performed by: INTERNAL MEDICINE

## 2020-12-01 PROCEDURE — 83735 ASSAY OF MAGNESIUM: CPT | Mod: ZL | Performed by: INTERNAL MEDICINE

## 2020-12-01 PROCEDURE — 80053 COMPREHEN METABOLIC PANEL: CPT | Mod: ZL | Performed by: INTERNAL MEDICINE

## 2020-12-01 PROCEDURE — 83540 ASSAY OF IRON: CPT | Mod: ZL | Performed by: INTERNAL MEDICINE

## 2020-12-01 PROCEDURE — G0463 HOSPITAL OUTPT CLINIC VISIT: HCPCS

## 2020-12-01 PROCEDURE — 84134 ASSAY OF PREALBUMIN: CPT | Mod: ZL | Performed by: INTERNAL MEDICINE

## 2020-12-01 PROCEDURE — 83550 IRON BINDING TEST: CPT | Mod: ZL | Performed by: INTERNAL MEDICINE

## 2020-12-01 PROCEDURE — 84443 ASSAY THYROID STIM HORMONE: CPT | Mod: ZL | Performed by: INTERNAL MEDICINE

## 2020-12-01 RX ORDER — NALTREXONE HYDROCHLORIDE 50 MG/1
50 TABLET, FILM COATED ORAL DAILY
COMMUNITY
Start: 2019-09-05 | End: 2021-01-11

## 2020-12-01 RX ORDER — POTASSIUM CHLORIDE 1500 MG/1
20 TABLET, EXTENDED RELEASE ORAL DAILY
COMMUNITY
Start: 2020-10-30

## 2020-12-01 RX ORDER — IBUPROFEN 600 MG/1
TABLET ORAL
COMMUNITY
Start: 2020-10-31

## 2020-12-01 RX ORDER — LAMOTRIGINE 150 MG/1
150 TABLET ORAL
COMMUNITY
Start: 2020-11-03 | End: 2021-01-11

## 2020-12-01 RX ORDER — FUROSEMIDE 20 MG
40 TABLET ORAL DAILY
COMMUNITY
Start: 2020-10-30 | End: 2021-01-11

## 2020-12-01 RX ORDER — ACETAMINOPHEN 500 MG
1000 TABLET ORAL PRN
COMMUNITY
Start: 2019-12-01

## 2020-12-01 RX ORDER — L. ACIDOPHILUS/L.BULGARICUS 100MM CELL
1 GRANULES IN PACKET (EA) ORAL
COMMUNITY
End: 2021-01-11 | Stop reason: ALTCHOICE

## 2020-12-01 RX ORDER — OXYCODONE HYDROCHLORIDE 5 MG/1
5 CAPSULE ORAL
COMMUNITY
Start: 2020-11-13 | End: 2020-12-04

## 2020-12-01 SDOH — HEALTH STABILITY: MENTAL HEALTH: HOW MANY STANDARD DRINKS CONTAINING ALCOHOL DO YOU HAVE ON A TYPICAL DAY?: NOT ASKED

## 2020-12-01 SDOH — HEALTH STABILITY: MENTAL HEALTH: HOW OFTEN DO YOU HAVE A DRINK CONTAINING ALCOHOL?: NOT ASKED

## 2020-12-01 SDOH — HEALTH STABILITY: MENTAL HEALTH: HOW MANY DRINKS CONTAINING ALCOHOL DO YOU HAVE ON A TYPICAL DAY WHEN YOU ARE DRINKING?: NOT ASKED

## 2020-12-01 SDOH — HEALTH STABILITY: MENTAL HEALTH: HOW OFTEN DO YOU HAVE SIX OR MORE DRINKS ON ONE OCCASION?: NOT ASKED

## 2020-12-01 SDOH — HEALTH STABILITY: MENTAL HEALTH: HOW OFTEN DO YOU HAVE 6 OR MORE DRINKS ON ONE OCCASION?: NOT ASKED

## 2020-12-01 ASSESSMENT — PAIN SCALES - GENERAL: PAINLEVEL: EXTREME PAIN (8)

## 2020-12-01 NOTE — PATIENT INSTRUCTIONS
-- Labs today   -- Schedule circulation test   -- Schedule breathing test   -- Meet with nutritionist again   -- No new antibiotics today     -- Low salt diet, under 2000 mg/day   -- Fluid restrict, under 2000 mL/day aka 8.5 cups/day   -- Learn about DASH Diet ([email or web address] - redirects to the [facility]) for dietary ways to reduce blood pressure   -- Elevate feet above your hips for 20-30 minutes, 4 times per day   -- Compression stockings or wraps from morning to night   -- Schedule echocardiogram     -- Follow-up after testing   -- Continue with Tonja for wound care

## 2020-12-01 NOTE — PROGRESS NOTES
Subjective  Funmilayo Stratton is a 60 year old female who presents for evaluation of wounds on feet.  She has diabetic ulcers on her heels.  She has been seeing Tonja.  The nurse thought that the left side was getting more red.  They thought maybe the infection was spreading.  She says her feet are cold all the time.  She had to move into the nursing home because she was not taking good care of herself.  She is trying to eat more healthy.  She thinks it is related to her history of gastric bypass.    Problem List/PMH: reviewed in EMR, and made relevant updates today.  Medications: reviewed in EMR, and made relevant updates today.  Allergies: reviewed in EMR, and made relevant updates today.    Social Hx:  Social History     Tobacco Use     Smoking status: Current Every Day Smoker     Packs/day: 1.00     Years: 46.00     Pack years: 46.00     Last attempt to quit: 2008     Years since quittin.2     Smokeless tobacco: Never Used   Substance Use Topics     Alcohol use: Not Currently     Comment: 1 liter of whiskey per day, last drank at 1100 3/5/2019     Drug use: No     Social History     Social History Narrative     Not on file     I reviewed social history and made relevant updates today.    Family Hx:   Family History   Problem Relation Age of Onset     Hypertension Mother      Diabetes Mother      Asthma Father      Diabetes Father      Hypertension Father      Cerebrovascular Disease Father      Circulatory Father      Eye Disorder Father        Objective  Vitals: reviewed in EMR.  /86 (BP Location: Right arm, Patient Position: Sitting, Cuff Size: Adult Regular)   Pulse 87   Temp 98  F (36.7  C) (Tympanic)   Resp 18   Wt 70.3 kg (155 lb)   LMP 2008 (LMP Unknown)   SpO2 98%   Breastfeeding No   BMI 24.28 kg/m      Gen: Pleasant female, NAD.  HEENT: MMM  Neck: Supple  CV: RRR  Pulm: occasional squeaks, otherwise clear and Breathing easily  Neuro: Grossly intact  Skin: There is some  erythema on the dorsum of the left foot without any warmth.  Psychiatric: Normal affect and insight. Does not appear anxious or depressed.  Musculoskeletal: 2+ pitting edema of the lower extremities bilaterally    Labs:  Lab Results   Component Value Date    WBC 3.7 (L) 12/01/2020    HGB 8.5 (L) 12/01/2020    HCT 25.2 (L) 12/01/2020     12/01/2020    CHOL 120 12/01/2020    TRIG 69 12/01/2020    HDL 62 12/01/2020    ALT 83 (H) 12/01/2020     (H) 12/01/2020     12/01/2020    BUN 20 12/01/2020    CO2 25 12/01/2020    TSH 0.71 11/07/2007       Glucose   Date Value Ref Range Status   12/01/2020 146 (H) 70 - 105 mg/dL Final   03/06/2019 747 (HH) 70 - 105 mg/dL Final     Comment:     Critical Value called to and read back by   3/6/19 1405 ML       Hemoglobin A1C   Date Value Ref Range Status   08/28/2008 8.0 (H) 4.3 - 6.0 % Final   07/14/2008 10.2 (H) 4.3 - 6.0 % Final     Creatinine   Date Value Ref Range Status   12/01/2020 0.71 0.60 - 1.20 mg/dL Final   03/06/2019 1.08 0.60 - 1.20 mg/dL Final     LDL Cholesterol Calculated   Date Value Ref Range Status   12/01/2020 44 <100 mg/dL Final     Comment:     Desirable:       <100 mg/dl     Thyrotropin   Date Value Ref Range Status   12/01/2020 19.61 (H) 0.34 - 5.60 IU/mL Final               Assessment    ICD-10-CM    1. Diabetic ulcer of left heel associated with type 2 diabetes mellitus, unspecified ulcer stage (H)  E11.621     L97.429    2. Venous stasis of both lower extremities  I87.8 Magnesium     US MACHELLE Doppler No Exercise 1-2 Levels Bilateral     Echocardiogram Complete     Magnesium   3. Malnutrition, unspecified type (H)  E46 Comprehensive metabolic panel     Prealbumin     Magnesium     Prealbumin     Magnesium     Comprehensive metabolic panel   4. S/P gastric bypass  Z98.84 CBC with platelets differential     Comprehensive metabolic panel     Vitamin B12     Vitamin D Total GH     Iron Binding Panel GH     Iron Binding Panel GH      Vitamin D Total GH     Vitamin B12     Comprehensive metabolic panel     CBC with platelets differential   5. Controlled type 2 diabetes mellitus with complication, with long-term current use of insulin (H)  E11.8     Z79.4    6. Vitamin D deficiency  E55.9    7. Pure hypercholesterolemia  E78.00 Lipid Profile     Lipid Profile   8. Hypothyroidism, unspecified type  E03.9 Thyrotropin GH     Thyrotropin GH   9. Essential hypertension  I10    10. Tobacco use disorder  F17.200 Pulmonary Function Test ()     Orders Placed This Encounter   Procedures     US MACHELLE Doppler No Exercise 1-2 Levels Bilateral     CBC with platelets differential     Comprehensive metabolic panel     Lipid Profile     Thyrotropin GH     Vitamin B12     Vitamin D Total GH     Iron Binding Panel GH     Prealbumin     Magnesium     Echocardiogram Complete     Pulmonary Function Test ()       I do see the erythema they are discussing however this does not appear consistent with an infection.  I think venous stasis is a more likely possibility.  She has a significant amount of swelling which I think is likely due to malnutrition.  Additional lab work is recommended as outlined above.  Certainly a circulatory test with MACHELLE would be recommended.  She probably also has emphysema.  We discussed the possibility of PFTs and the patient would like to do that.    Plan   -- Expected clinical course discussed   -- Medications and their side effects discussed  Patient Instructions    -- Labs today   -- Schedule circulation test   -- Schedule breathing test   -- Meet with nutritionist again   -- No new antibiotics today     -- Low salt diet, under 2000 mg/day   -- Fluid restrict, under 2000 mL/day aka 8.5 cups/day   -- Learn about DASH Diet (http://bit.[employer]udar[employer]/DASHDiet - redirects to the [employer]) for dietary ways to reduce blood pressure   -- Elevate feet above your hips for 20-30 minutes, 4 times per day   -- Compression stockings or wraps from morning to  night   -- Schedule echocardiogram     -- Follow-up after testing   -- Continue with Tonja for wound care      Return in about 1 month (around 1/1/2021) for follow-up results.    Signed, Abiel Castañeda MD, FAAP, FACP  Internal Medicine & Pediatrics

## 2020-12-01 NOTE — NURSING NOTE
"Chief Complaint   Patient presents with     WOUND CARE     Feet      Patient is here in regards to wounds on her feet. Patient states she saw Tonja SORTO At the nursing home and doesn't know why she is scheduled for wound care with Dr. Castañeda.     Initial /86 (BP Location: Right arm, Patient Position: Sitting, Cuff Size: Adult Regular)   Pulse 87   Temp 98  F (36.7  C) (Tympanic)   Resp 18   Wt 70.3 kg (155 lb)   LMP 06/26/2008 (LMP Unknown)   SpO2 98%   Breastfeeding No   BMI 24.28 kg/m   Estimated body mass index is 24.28 kg/m  as calculated from the following:    Height as of 3/6/19: 1.702 m (5' 7\").    Weight as of this encounter: 70.3 kg (155 lb).  Medication Reconciliation: complete    Keyonna Ambrosio MA  "

## 2020-12-04 ENCOUNTER — HOSPITAL ENCOUNTER (EMERGENCY)
Facility: OTHER | Age: 60
Discharge: HOME | End: 2020-12-04
Attending: STUDENT IN AN ORGANIZED HEALTH CARE EDUCATION/TRAINING PROGRAM | Admitting: STUDENT IN AN ORGANIZED HEALTH CARE EDUCATION/TRAINING PROGRAM
Payer: MEDICAID

## 2020-12-04 ENCOUNTER — APPOINTMENT (OUTPATIENT)
Dept: GENERAL RADIOLOGY | Facility: OTHER | Age: 60
End: 2020-12-04
Attending: STUDENT IN AN ORGANIZED HEALTH CARE EDUCATION/TRAINING PROGRAM
Payer: MEDICAID

## 2020-12-04 VITALS
HEART RATE: 74 BPM | TEMPERATURE: 97.5 F | SYSTOLIC BLOOD PRESSURE: 107 MMHG | OXYGEN SATURATION: 100 % | BODY MASS INDEX: 28.72 KG/M2 | DIASTOLIC BLOOD PRESSURE: 71 MMHG | HEIGHT: 67 IN | RESPIRATION RATE: 18 BRPM | WEIGHT: 183 LBS

## 2020-12-04 DIAGNOSIS — L08.9 RIGHT FOOT INFECTION: ICD-10-CM

## 2020-12-04 DIAGNOSIS — L97.519 DIABETIC ULCER OF RIGHT FOOT ASSOCIATED WITH TYPE 2 DIABETES MELLITUS, UNSPECIFIED PART OF FOOT, UNSPECIFIED ULCER STAGE (H): ICD-10-CM

## 2020-12-04 DIAGNOSIS — E11.621 DIABETIC ULCER OF RIGHT FOOT ASSOCIATED WITH TYPE 2 DIABETES MELLITUS, UNSPECIFIED PART OF FOOT, UNSPECIFIED ULCER STAGE (H): ICD-10-CM

## 2020-12-04 DIAGNOSIS — M79.671 RIGHT FOOT PAIN: ICD-10-CM

## 2020-12-04 LAB
ANION GAP SERPL CALCULATED.3IONS-SCNC: 3 MMOL/L (ref 3–14)
BASOPHILS # BLD AUTO: 0 10E9/L (ref 0–0.2)
BASOPHILS NFR BLD AUTO: 1 %
BUN SERPL-MCNC: 23 MG/DL (ref 7–25)
CALCIUM SERPL-MCNC: 7.6 MG/DL (ref 8.6–10.3)
CHLORIDE SERPL-SCNC: 107 MMOL/L (ref 98–107)
CO2 SERPL-SCNC: 26 MMOL/L (ref 21–31)
CREAT SERPL-MCNC: 0.81 MG/DL (ref 0.6–1.2)
CRP SERPL-MCNC: 1.1 MG/L
DIFFERENTIAL METHOD BLD: ABNORMAL
EOSINOPHIL # BLD AUTO: 0 10E9/L (ref 0–0.7)
EOSINOPHIL NFR BLD AUTO: 1.3 %
ERYTHROCYTE [DISTWIDTH] IN BLOOD BY AUTOMATED COUNT: 14.2 % (ref 10–15)
ERYTHROCYTE [SEDIMENTATION RATE] IN BLOOD BY WESTERGREN METHOD: 20 MM/H (ref 1–15)
GFR SERPL CREATININE-BSD FRML MDRD: 73 ML/MIN/{1.73_M2}
GLUCOSE SERPL-MCNC: 300 MG/DL (ref 70–105)
HCT VFR BLD AUTO: 21.9 % (ref 35–47)
HGB BLD-MCNC: 7.3 G/DL (ref 11.7–15.7)
IMM GRANULOCYTES # BLD: 0 10E9/L (ref 0–0.4)
IMM GRANULOCYTES NFR BLD: 0.3 %
LYMPHOCYTES # BLD AUTO: 1.2 10E9/L (ref 0.8–5.3)
LYMPHOCYTES NFR BLD AUTO: 37.8 %
MCH RBC QN AUTO: 35.1 PG (ref 26.5–33)
MCHC RBC AUTO-ENTMCNC: 33.3 G/DL (ref 31.5–36.5)
MCV RBC AUTO: 105 FL (ref 78–100)
MONOCYTES # BLD AUTO: 0.2 10E9/L (ref 0–1.3)
MONOCYTES NFR BLD AUTO: 7.4 %
NEUTROPHILS # BLD AUTO: 1.6 10E9/L (ref 1.6–8.3)
NEUTROPHILS NFR BLD AUTO: 52.2 %
PLATELET # BLD AUTO: 231 10E9/L (ref 150–450)
POTASSIUM SERPL-SCNC: 4.4 MMOL/L (ref 3.5–5.1)
RBC # BLD AUTO: 2.08 10E12/L (ref 3.8–5.2)
SODIUM SERPL-SCNC: 136 MMOL/L (ref 134–144)
WBC # BLD AUTO: 3.1 10E9/L (ref 4–11)

## 2020-12-04 PROCEDURE — 85652 RBC SED RATE AUTOMATED: CPT | Performed by: STUDENT IN AN ORGANIZED HEALTH CARE EDUCATION/TRAINING PROGRAM

## 2020-12-04 PROCEDURE — 258N000003 HC RX IP 258 OP 636: Performed by: STUDENT IN AN ORGANIZED HEALTH CARE EDUCATION/TRAINING PROGRAM

## 2020-12-04 PROCEDURE — 80048 BASIC METABOLIC PNL TOTAL CA: CPT | Performed by: STUDENT IN AN ORGANIZED HEALTH CARE EDUCATION/TRAINING PROGRAM

## 2020-12-04 PROCEDURE — 86140 C-REACTIVE PROTEIN: CPT | Performed by: STUDENT IN AN ORGANIZED HEALTH CARE EDUCATION/TRAINING PROGRAM

## 2020-12-04 PROCEDURE — 250N000011 HC RX IP 250 OP 636: Performed by: STUDENT IN AN ORGANIZED HEALTH CARE EDUCATION/TRAINING PROGRAM

## 2020-12-04 PROCEDURE — 99284 EMERGENCY DEPT VISIT MOD MDM: CPT | Mod: 25 | Performed by: STUDENT IN AN ORGANIZED HEALTH CARE EDUCATION/TRAINING PROGRAM

## 2020-12-04 PROCEDURE — 96365 THER/PROPH/DIAG IV INF INIT: CPT | Performed by: STUDENT IN AN ORGANIZED HEALTH CARE EDUCATION/TRAINING PROGRAM

## 2020-12-04 PROCEDURE — 36415 COLL VENOUS BLD VENIPUNCTURE: CPT | Performed by: STUDENT IN AN ORGANIZED HEALTH CARE EDUCATION/TRAINING PROGRAM

## 2020-12-04 PROCEDURE — 99284 EMERGENCY DEPT VISIT MOD MDM: CPT | Performed by: STUDENT IN AN ORGANIZED HEALTH CARE EDUCATION/TRAINING PROGRAM

## 2020-12-04 PROCEDURE — 250N000013 HC RX MED GY IP 250 OP 250 PS 637: Performed by: STUDENT IN AN ORGANIZED HEALTH CARE EDUCATION/TRAINING PROGRAM

## 2020-12-04 PROCEDURE — 85025 COMPLETE CBC W/AUTO DIFF WBC: CPT | Performed by: STUDENT IN AN ORGANIZED HEALTH CARE EDUCATION/TRAINING PROGRAM

## 2020-12-04 PROCEDURE — 96366 THER/PROPH/DIAG IV INF ADDON: CPT | Performed by: STUDENT IN AN ORGANIZED HEALTH CARE EDUCATION/TRAINING PROGRAM

## 2020-12-04 PROCEDURE — 73630 X-RAY EXAM OF FOOT: CPT | Mod: RT

## 2020-12-04 PROCEDURE — 96375 TX/PRO/DX INJ NEW DRUG ADDON: CPT | Performed by: STUDENT IN AN ORGANIZED HEALTH CARE EDUCATION/TRAINING PROGRAM

## 2020-12-04 RX ORDER — HYDROMORPHONE HYDROCHLORIDE 1 MG/ML
0.5 INJECTION, SOLUTION INTRAMUSCULAR; INTRAVENOUS; SUBCUTANEOUS ONCE
Status: DISCONTINUED | OUTPATIENT
Start: 2020-12-04 | End: 2020-12-04 | Stop reason: HOSPADM

## 2020-12-04 RX ORDER — OXYCODONE HYDROCHLORIDE 5 MG/1
10 TABLET ORAL ONCE
Status: COMPLETED | OUTPATIENT
Start: 2020-12-04 | End: 2020-12-04

## 2020-12-04 RX ORDER — HYDROMORPHONE HYDROCHLORIDE 1 MG/ML
0.5 INJECTION, SOLUTION INTRAMUSCULAR; INTRAVENOUS; SUBCUTANEOUS ONCE
Status: COMPLETED | OUTPATIENT
Start: 2020-12-04 | End: 2020-12-04

## 2020-12-04 RX ORDER — DOXYCYCLINE 100 MG/1
100 CAPSULE ORAL 2 TIMES DAILY
Qty: 10 CAPSULE | Refills: 0 | Status: SHIPPED | OUTPATIENT
Start: 2020-12-04 | End: 2020-12-09

## 2020-12-04 RX ORDER — OXYCODONE HYDROCHLORIDE 5 MG/1
5-10 TABLET ORAL EVERY 6 HOURS PRN
Qty: 15 TABLET | Refills: 0 | Status: SHIPPED | OUTPATIENT
Start: 2020-12-04 | End: 2021-01-06

## 2020-12-04 RX ORDER — CEPHALEXIN 500 MG/1
500 CAPSULE ORAL 4 TIMES DAILY
Qty: 20 CAPSULE | Refills: 0 | Status: SHIPPED | OUTPATIENT
Start: 2020-12-04 | End: 2020-12-09

## 2020-12-04 RX ADMIN — SODIUM CHLORIDE 1500 MG: 900 INJECTION, SOLUTION INTRAVENOUS at 02:18

## 2020-12-04 RX ADMIN — OXYCODONE HYDROCHLORIDE 10 MG: 5 TABLET ORAL at 01:46

## 2020-12-04 RX ADMIN — HYDROMORPHONE HYDROCHLORIDE 0.5 MG: 1 INJECTION, SOLUTION INTRAMUSCULAR; INTRAVENOUS; SUBCUTANEOUS at 03:00

## 2020-12-04 ASSESSMENT — MIFFLIN-ST. JEOR: SCORE: 1442.71

## 2020-12-04 NOTE — ED TRIAGE NOTES
Pt with hx of R foot ulceration/infection comes in complaining of intractable R foot pain.  Pt took 5mg oxycodone without relief, 15mg ketamine by ems.

## 2020-12-04 NOTE — ED PROVIDER NOTES
History     Chief Complaint   Patient presents with     Wound Infection     Pain     HPI  Funmilayo Stratton is a 60 year old female with history of HTN, hypothyroidism, HLD, tobacco use, s/p gastric bypass, T2DM, bilateral lower extremity diabetic ulcers including dorsum of right foot and left heel who presents with worsening right foot pain over the past couple of days. Patient reports chronic pain in her right foot related to her ulcer, however over the last 2 days or so has noted increasing pain and redness in the area immediately around the wound on the dorsum of her right foot. She denies fevers or chills, nausea/vomiting, or other complaints. She took her PTA oxycodone 5 mg this evening without any significant relief. She also received 15 mg IV ketamine from EMS.    Allergies:  Allergies   Allergen Reactions     Sulfa Drugs Rash     Vicodin [Hydrocodone-Acetaminophen] Rash       Problem List:    Patient Active Problem List    Diagnosis Date Noted     S/P gastric bypass 12/01/2020     Priority: Medium     Malnutrition, unspecified type (H) 12/01/2020     Priority: Medium     Venous stasis of both lower extremities 12/01/2020     Priority: Medium     Diabetic ulcer of left heel associated with type 2 diabetes mellitus, unspecified ulcer stage (H) 12/01/2020     Priority: Medium     HYPERLIPIDEMIA LDL GOAL <100 05/09/2010     Priority: Medium     Controlled type 2 diabetes mellitus with complication, with long-term current use of insulin (H) 05/02/2010     Priority: Medium     Vitamin D deficiency 09/21/2008     Priority: Medium     (Problem list name updated by automated process. Provider to review and confirm.)       Allergic rhinitis 05/06/2008     Priority: Medium     Problem list name updated by automated process. Provider to review       Esophageal reflux 01/21/2008     Priority: Medium     Tobacco use disorder 11/15/2007     Priority: Medium     Pure hypercholesterolemia 10/17/2006     Priority: Medium      Episodic mood disorder (H) 2006     Priority: Medium     Problem list name updated by automated process. Provider to review       Essential hypertension 2006     Priority: Medium     Problem list name updated by automated process. Provider to review       Hypothyroidism 2006     Priority: Medium     Problem list name updated by automated process. Provider to review          Past Medical History:    Past Medical History:   Diagnosis Date     DIABETES MELLITUS TYPE II-UNCOMPL 2006     HYPERTENSION NOS 2006     HYPOTHYROIDISM NOS 2006     Vitamin D deficiencies 2008       Past Surgical History:    Reviewed in [facility].  H/o Gabbie-en-Y gastric bypass.  H/o cholecystectomy    Family History:    Family History   Problem Relation Age of Onset     Hypertension Mother      Diabetes Mother      Asthma Father      Diabetes Father      Hypertension Father      Cerebrovascular Disease Father      Circulatory Father      Eye Disorder Father        Social History:  Marital Status:   [4]  Social History     Tobacco Use     Smoking status: Current Every Day Smoker     Packs/day: 1.00     Years: 46.00     Pack years: 46.00     Last attempt to quit: 2008     Years since quittin.3     Smokeless tobacco: Never Used   Substance Use Topics     Alcohol use: Not Currently     Comment: 1 liter of whiskey per day, last drank at 1100 3/5/2019     Drug use: No        Medications:         cephALEXin (KEFLEX) 500 MG capsule       doxycycline hyclate (VIBRAMYCIN) 100 MG capsule       oxyCODONE (ROXICODONE) 5 MG tablet       acetaminophen (TYLENOL) 500 MG tablet       ACYCLOVIR 400 MG OR TABS       ASPIRIN 81 MG OR TABS       BD U/F III SHORT PEN NEEDLE 31G X 8 MM MISC       calcium carbonate (TUMS) 500 MG chewable tablet       CLARITIN-D 24 HOUR#  MG OR TB24       ferrous sulfate (FEROSUL) 325 (65 Fe) MG tablet       fluticasone (VERAMYST) 27.5 MCG/SPRAY nasal spray        "furosemide (LASIX) 20 MG tablet       GLUCAGON EMERGENCY 1 MG kit       Lactobacillus (LACTINEX) PACK       lamoTRIgine (LAMICTAL) 150 MG tablet       LANTUS SOLOSTAR 100 UNIT/ML SC SOLN       levothyroxine (SYNTHROID/LEVOTHROID) 137 MCG tablet       LISINOPRIL 10 MG OR TABS       mirtazapine (REMERON) 15 MG tablet       MULTIVITAMIN/IRON OR TABS       naltrexone (DEPADE/REVIA) 50 MG tablet       NOVOLOG FLEXPEN 100 UNIT/ML SC SOLN       omeprazole (PRILOSEC) 40 MG DR capsule       ONE TOUCH ULTRA BONUS PACK STRP   VI       ONETOUCH ULTRASOFT LANCETS MISC       potassium chloride ER (KLOR-CON M) 20 MEQ CR tablet       QUEtiapine (SEROQUEL) 25 MG tablet       vitamin D3 (CHOLECALCIFEROL) 2000 units tablet          Review of Systems  10-point ROS complete and negative other than as noted in HPI above.    Physical Exam   BP: 109/69  Pulse: 80  Temp: 97.5  F (36.4  C)  Resp: 20  Height: 170.2 cm (5' 7\")  Weight: 83 kg (183 lb)  SpO2: 99 %      Physical Exam  Gen: Lying in bed, in moderate pain  HEENT: NC/AT, MMM  CV: RRR, no murmurs, appears warm and well-perfused  Pulm: CTAB, no wheezing or crackles, normal respiratory effort  Abd: Soft, NT, ND, no guarding/rebound  Skin: Shallow-based ulcer over distal dorsum of right foot measuring approximately 6 x 2 cm, with surrounding erythema somewhat proximally but primarily distal to the wound involving all 5 toes and plantar aspect of the foot. No purulence. Small and more shallow-based ulcer to posterior left heel.  MSK: 1-2+ edema BLE to mid-shins.  Neuro: A&O x4, no focal deficits, CN II-XII grossly intact    ED Course     ED Course as of Dec 04 0501   Fri Dec 04, 2020   0216 CBC with mild leukopenia, also anemia with hemoglobin of 7.3 (macrocytic); likely multifactorial, not far from baseline, do not suspect acute bleed      0227 X-ray of right foot: on my review no clear osteomyelitic changes      0231 BMP with hyperglycemia to 300, hypocalcemia, normal creatinine. CRP " within normal limits      0241 ESR minimally elevated to 20      0403 Patient still having a fair amount of pain after oxycodone 10 mg and dilaudid 0.5 mg IV, will give additional 0.5 mg IV dilaudid      0425 Pain improved prior to receiving 2nd dilaudid dose, will hold this dose. Anticipate discharge with outpatient podiatry follow-up      0441 Patient re-evaluated, feeling much better. Discussed additional antibiotic course and close outpatient podiatry follow-up        Procedures               Critical Care time:  none               Results for orders placed or performed during the hospital encounter of 12/04/20 (from the past 24 hour(s))   CBC with platelets differential   Result Value Ref Range    WBC 3.1 (L) 4.0 - 11.0 10e9/L    RBC Count 2.08 (L) 3.8 - 5.2 10e12/L    Hemoglobin 7.3 (L) 11.7 - 15.7 g/dL    Hematocrit 21.9 (L) 35.0 - 47.0 %     (H) 78 - 100 fl    MCH 35.1 (H) 26.5 - 33.0 pg    MCHC 33.3 31.5 - 36.5 g/dL    RDW 14.2 10.0 - 15.0 %    Platelet Count 231 150 - 450 10e9/L    Diff Method Automated Method     % Neutrophils 52.2 %    % Lymphocytes 37.8 %    % Monocytes 7.4 %    % Eosinophils 1.3 %    % Basophils 1.0 %    % Immature Granulocytes 0.3 %    Absolute Neutrophil 1.6 1.6 - 8.3 10e9/L    Absolute Lymphocytes 1.2 0.8 - 5.3 10e9/L    Absolute Monocytes 0.2 0.0 - 1.3 10e9/L    Absolute Eosinophils 0.0 0.0 - 0.7 10e9/L    Absolute Basophils 0.0 0.0 - 0.2 10e9/L    Abs Immature Granulocytes 0.0 0 - 0.4 10e9/L   Basic metabolic panel   Result Value Ref Range    Sodium 136 134 - 144 mmol/L    Potassium 4.4 3.5 - 5.1 mmol/L    Chloride 107 98 - 107 mmol/L    Carbon Dioxide 26 21 - 31 mmol/L    Anion Gap 3 3 - 14 mmol/L    Glucose 300 (H) 70 - 105 mg/dL    Urea Nitrogen 23 7 - 25 mg/dL    Creatinine 0.81 0.60 - 1.20 mg/dL    GFR Estimate 73 >60 mL/min/[1.73_m2]    GFR Estimate If Black 88 >60 mL/min/[1.73_m2]    Calcium 7.6 (L) 8.6 - 10.3 mg/dL   CRP inflammation   Result Value Ref Range    CRP  Inflammation 1.1 <10.0 mg/L   Erythrocyte sedimentation rate auto   Result Value Ref Range    Sed Rate 20 (H) 1 - 15 mm/h       Medications   HYDROmorphone (PF) (DILAUDID) injection 0.5 mg (has no administration in time range)   vancomycin (VANCOCIN) 1,500 mg in sodium chloride 0.9 % 500 mL intermittent infusion (0 mg/kg × 83 kg Intravenous Stopped 12/4/20 0447)   oxyCODONE (ROXICODONE) tablet 10 mg (10 mg Oral Given 12/4/20 0146)   HYDROmorphone (PF) (DILAUDID) injection 0.5 mg (0.5 mg Intravenous Given 12/4/20 0300)       Assessments & Plan (with Medical Decision Making)   60-year-old woman with history of HTN, hypothyroidism, HLD, tobacco use, s/p gastric bypass, T2DM, bilateral lower extremity diabetic ulcers including dorsum of right foot and left heel who presents with worsening right foot pain over the past couple of days. Vitally stable, afebrile here. In pain but non-toxic, examined as above. Findings are concerning for cellulitis about the right foot ulcer. X-ray without any apparent osteomyelitic changes, wound is fairly shallow and certainly does suggest deeper structure involvement. Erythema surrounding the wound was outlined with skin marker. Basic labs notable for normal CRP and only mildly elevated ESR with normal white count, findings not suggestive of systemic infection or severe cellulitis. She received a dose of IV vancomycin as she already had an IV and does have a history of MDRO's including MRSA per report. She recently has been completing a course of doxycycline; I am wondering if this is not providing sufficient strep coverage alone, will add keflex and continue 5 more days of antibiotic therapy for persistent infection. If infection worsening despite these antibiotics will require admission for IV antibiotics. At this time we were able to get her pain under control with additional 10 mg oral oxycodone and 0.5 mg IV dilaudid. Will discharge with increased oxycodone dose up to 10 mg q6h as  needed as well as antibiotics. Careful ED return precautions reviewed with patient including worsening redness, purulent drainage from the wound, fevers, or severe or worsening pain not controlled by outpatient regimen.    I have reviewed the nursing notes.    I have reviewed the findings, diagnosis, plan and need for follow up with the patient.       New Prescriptions    CEPHALEXIN (KEFLEX) 500 MG CAPSULE    Take 1 capsule (500 mg) by mouth 4 times daily for 5 days    DOXYCYCLINE HYCLATE (VIBRAMYCIN) 100 MG CAPSULE    Take 1 capsule (100 mg) by mouth 2 times daily for 5 days    OXYCODONE (ROXICODONE) 5 MG TABLET    Take 1-2 tablets (5-10 mg) by mouth every 6 hours as needed for severe pain       Final diagnoses:   Diabetic ulcer of right foot associated with type 2 diabetes mellitus, unspecified part of foot, unspecified ulcer stage (H)   Right foot infection   Right foot pain       12/4/2020   [facility] AND [facility]""     [facility] Joe Johnson MD  12/04/20 0504

## 2020-12-04 NOTE — ED AVS SNAPSHOT
[facility]  [address]  [place] [address]  Phone: [phone]  Fax: [phone]                                    Funmilayo Stratton   MRN: [id number]    Department: [facility]   Date of Visit: 12/4/2020           After Visit Summary Signature Page    I have received my discharge instructions, and my questions have been answered. I have discussed any challenges I see with this plan with the nurse or doctor.    ..........................................................................................................................................  Patient/Patient Representative Signature      ..........................................................................................................................................  Patient Representative Print Name and Relationship to Patient    ..................................................               ................................................  Date                                   Time    ..........................................................................................................................................  Reviewed by Signature/Title    ...................................................              ..............................................  Date                                               Time          22EPIC Rev 08/18

## 2020-12-04 NOTE — DISCHARGE INSTRUCTIONS
I have prescribed you a higher dose of oxycodone (up to 10 mg every 6 hours) to use for pain until you can be re-evaluated at your nursing facility and until your foot infection improves.    Start taking keflex and continue taking additional doxycycline to complete 5 more days of antibiotic therapy.    Follow up with Dr. Ga (podiatrist) to discuss additional management options of your diabetic foot ulcers.

## 2020-12-07 ENCOUNTER — DOCUMENTATION ONLY (OUTPATIENT)
Dept: OTHER | Facility: CLINIC | Age: 60
End: 2020-12-07

## 2020-12-11 ENCOUNTER — TELEPHONE (OUTPATIENT)
Dept: PEDIATRICS | Facility: OTHER | Age: 60
End: 2020-12-11

## 2020-12-11 NOTE — TELEPHONE ENCOUNTER
Talked with Margareth. Told her evaluation is recommended. She will have [facility] stop by and see her when she is there.  Emely Church LPN.........................12/11/2020  2:26 PM

## 2020-12-15 ENCOUNTER — TELEPHONE (OUTPATIENT)
Dept: PEDIATRICS | Facility: OTHER | Age: 60
End: 2020-12-15

## 2020-12-15 NOTE — TELEPHONE ENCOUNTER
[facility] called to let you know they are not able to do a 24 hour collection due to the patient being incontinent.    Please call as needed    Thank you

## 2020-12-18 ENCOUNTER — OFFICE VISIT (OUTPATIENT)
Dept: PODIATRY | Facility: OTHER | Age: 60
End: 2020-12-18
Attending: PODIATRIST
Payer: MEDICAID

## 2020-12-18 VITALS
BODY MASS INDEX: 25.84 KG/M2 | DIASTOLIC BLOOD PRESSURE: 70 MMHG | SYSTOLIC BLOOD PRESSURE: 118 MMHG | TEMPERATURE: 97.7 F | OXYGEN SATURATION: 100 % | HEART RATE: 70 BPM | WEIGHT: 165 LBS

## 2020-12-18 DIAGNOSIS — E11.621 DIABETIC ULCER OF TOE OF LEFT FOOT ASSOCIATED WITH TYPE 2 DIABETES MELLITUS, WITH FAT LAYER EXPOSED (H): ICD-10-CM

## 2020-12-18 DIAGNOSIS — M79.671 RIGHT FOOT PAIN: ICD-10-CM

## 2020-12-18 DIAGNOSIS — I87.323 CHRONIC VENOUS HYPERTENSION (IDIOPATHIC) WITH INFLAMMATION OF BILATERAL LOWER EXTREMITY: ICD-10-CM

## 2020-12-18 DIAGNOSIS — L97.412 DIABETIC ULCER OF RIGHT MIDFOOT ASSOCIATED WITH TYPE 2 DIABETES MELLITUS, WITH FAT LAYER EXPOSED (H): Primary | ICD-10-CM

## 2020-12-18 DIAGNOSIS — L97.422 DIABETIC ULCER OF LEFT MIDFOOT ASSOCIATED WITH TYPE 2 DIABETES MELLITUS, WITH FAT LAYER EXPOSED (H): ICD-10-CM

## 2020-12-18 DIAGNOSIS — E11.9 DIABETES MELLITUS TYPE 2, INSULIN DEPENDENT (H): ICD-10-CM

## 2020-12-18 DIAGNOSIS — Z79.4 DIABETES MELLITUS TYPE 2, INSULIN DEPENDENT (H): ICD-10-CM

## 2020-12-18 DIAGNOSIS — E11.621 DIABETIC ULCER OF LEFT HEEL ASSOCIATED WITH TYPE 2 DIABETES MELLITUS, WITH FAT LAYER EXPOSED (H): ICD-10-CM

## 2020-12-18 DIAGNOSIS — E11.621 DIABETIC ULCER OF LEFT MIDFOOT ASSOCIATED WITH TYPE 2 DIABETES MELLITUS, WITH FAT LAYER EXPOSED (H): ICD-10-CM

## 2020-12-18 DIAGNOSIS — E11.621 DIABETIC ULCER OF RIGHT MIDFOOT ASSOCIATED WITH TYPE 2 DIABETES MELLITUS, WITH FAT LAYER EXPOSED (H): Primary | ICD-10-CM

## 2020-12-18 DIAGNOSIS — L97.422 DIABETIC ULCER OF LEFT HEEL ASSOCIATED WITH TYPE 2 DIABETES MELLITUS, WITH FAT LAYER EXPOSED (H): ICD-10-CM

## 2020-12-18 DIAGNOSIS — I70.234 ATHEROSCLEROSIS OF NATIVE ARTERY OF RIGHT LOWER EXTREMITY WITH ULCERATION OF MIDFOOT (H): ICD-10-CM

## 2020-12-18 DIAGNOSIS — L97.522 DIABETIC ULCER OF TOE OF LEFT FOOT ASSOCIATED WITH TYPE 2 DIABETES MELLITUS, WITH FAT LAYER EXPOSED (H): ICD-10-CM

## 2020-12-18 PROCEDURE — G0463 HOSPITAL OUTPT CLINIC VISIT: HCPCS | Mod: 25

## 2020-12-18 PROCEDURE — 11042 DBRDMT SUBQ TIS 1ST 20SQCM/<: CPT

## 2020-12-18 PROCEDURE — 11042 DBRDMT SUBQ TIS 1ST 20SQCM/<: CPT | Performed by: PODIATRIST

## 2020-12-18 PROCEDURE — 99203 OFFICE O/P NEW LOW 30 MIN: CPT | Mod: 25 | Performed by: PODIATRIST

## 2020-12-18 ASSESSMENT — PAIN SCALES - GENERAL: PAINLEVEL: SEVERE PAIN (7)

## 2020-12-18 NOTE — NURSING NOTE
"Chief Complaint   Patient presents with     Consult For     NPT  Diabetic Ulcer Right Foot     Referral     Dr Johnson,  [place]       Initial /70   Pulse 70   Temp 97.7  F (36.5  C) (Tympanic)   Wt 74.8 kg (165 lb)   LMP 06/26/2008 (LMP Unknown)   SpO2 100%   BMI 25.84 kg/m   Estimated body mass index is 25.84 kg/m  as calculated from the following:    Height as of 12/4/20: 1.702 m (5' 7\").    Weight as of this encounter: 74.8 kg (165 lb).  Medication Reconciliation: complete  uJana Lebron LPN  "

## 2020-12-18 NOTE — PATIENT INSTRUCTIONS
Evaluation / PT:  -Please change both foot dressings every other day, but increase to daily if wounds drain through the dressings.  -Please check for signs of infection daily and go to the ED with any signs of infection.  -Every night, suspect both heels so there is zero pressure on the heels by applying a pillow beneath the calves. However, please elevated legs as minimal as possible.  -Please decrease physical therapy (non-weightbearing activities with PT) until vascular status is known next week.    Note:  Patient has minimal arterial blood flow to her RIGHT foot. She has a blood flow study scheduled on Monday, 12/21/2020. The plan of care may change as soon as these results are evaluated. A vascular surgeon from [facility] is being consulted and will determine if further studies are needed after patient's test on Monday.    Dressings:  -RIGHT foot dressing: Please apply Xeroform, gauze, ABD pad and tape or Kerlix and tape to the RIGHT foot.  -LEFT foot dressing: Please apply Mepilex Ag (cut to the size of the wound) and tape to the wound on the LEFT great toe, bottom surface of the forefoot, and the posterior heel    Call podiatry with any questions or or for clarification on instructions at [phone]. Thank you

## 2020-12-18 NOTE — PROGRESS NOTES
Chief complaint: Patient presents with:  Consult For: NPT  Diabetic Ulcer Right Foot  Referral: Dr Johnson  [facility]      History of Present Illness: This 60 year old IDDM II female is seen at the request of No ref. provider found for evaluation and suggestions of management of bilateral foot ulcerations.    Patient says she developed a bilateral heel ulcer and a RIGHT dorsal foot ulcer in August, 2020. The wounds started out as blisters and opened into large ulcers. She has seen wound care a couple times in [place], then she saw someone in [place] and someone else in [facility]. She is going through PT to get her muscle strength back because she has incerased in weakness since the wounds developed. However, PT is very painful for her because she cannot take more than a few steps without a lot of pain to her feet (RIGHT > LEFT). The RIGHT wound in particular is very painful at all times. It is worse when trying to walk. She has constant pain in the feet. She has tried taking Oxycodone 10mg, but it has not helped. She is wondering if anything else can help her with the pain.    She is currently living at the [facility] in [place], [place], and she has been there since around November, 2020.    All the wounds are painful. Prior to August, she never had wounds on her foot.    She has an MACHELLE scheduled for 12/21/2020.    No further pedal complaints today.     Last HbA1C was 6.6% on 09/23/2020.        /70   Pulse 70   Temp 97.7  F (36.5  C) (Tympanic)   Wt 74.8 kg (165 lb)   LMP 06/26/2008 (LMP Unknown)   SpO2 100%   BMI 25.84 kg/m      Patient Active Problem List   Diagnosis     Essential hypertension     Hypothyroidism     Episodic mood disorder (H)     Pure hypercholesterolemia     Tobacco use disorder     Esophageal reflux     Allergic rhinitis     Vitamin D deficiency     Controlled type 2 diabetes mellitus with complication, with long-term current use of insulin (H)     HYPERLIPIDEMIA LDL  GOAL <100     S/P gastric bypass     Malnutrition, unspecified type (H)     Venous stasis of both lower extremities     Diabetic ulcer of left heel associated with type 2 diabetes mellitus, unspecified ulcer stage (H)       History reviewed. No pertinent surgical history.    Current Outpatient Medications   Medication     acetaminophen (TYLENOL) 500 MG tablet     ACYCLOVIR 400 MG OR TABS     ASPIRIN 81 MG OR TABS     BD U/F III SHORT PEN NEEDLE 31G X 8 MM MISC     calcium carbonate (TUMS) 500 MG chewable tablet     CLARITIN-D 24 HOUR#  MG OR TB24     ferrous sulfate (FEROSUL) 325 (65 Fe) MG tablet     fluticasone (VERAMYST) 27.5 MCG/SPRAY nasal spray     furosemide (LASIX) 20 MG tablet     Lactobacillus (LACTINEX) PACK     lamoTRIgine (LAMICTAL) 150 MG tablet     LANTUS SOLOSTAR 100 UNIT/ML SC SOLN     levothyroxine (SYNTHROID/LEVOTHROID) 137 MCG tablet     LISINOPRIL 10 MG OR TABS     mirtazapine (REMERON) 15 MG tablet     MULTIVITAMIN/IRON OR TABS     naltrexone (DEPADE/REVIA) 50 MG tablet     NOVOLOG FLEXPEN 100 UNIT/ML SC SOLN     omeprazole (PRILOSEC) 40 MG DR capsule     ONE TOUCH ULTRA BONUS PACK STRP   VI     ONETOUCH ULTRASOFT LANCETS MISC     oxyCODONE (ROXICODONE) 5 MG tablet     potassium chloride ER (KLOR-CON M) 20 MEQ CR tablet     QUEtiapine (SEROQUEL) 25 MG tablet     vitamin D3 (CHOLECALCIFEROL) 2000 units tablet     GLUCAGON EMERGENCY 1 MG kit     No current facility-administered medications for this visit.           Allergies   Allergen Reactions     Seasonal Allergies      Sulfa Drugs Rash     Vicodin [Hydrocodone-Acetaminophen] Rash       Family History   Problem Relation Age of Onset     Hypertension Mother      Diabetes Mother      Asthma Father      Diabetes Father      Hypertension Father      Cerebrovascular Disease Father      Circulatory Father      Eye Disorder Father        Social History     Socioeconomic History     Marital status:      Spouse name: None     Number of  children: None     Years of education: None     Highest education level: None   Occupational History     None   Social Needs     Financial resource strain: None     Food insecurity     Worry: None     Inability: None     Transportation needs     Medical: None     Non-medical: None   Tobacco Use     Smoking status: Current Every Day Smoker     Packs/day: 0.50     Years: 46.00     Pack years: 23.00     Last attempt to quit: 2008     Years since quittin.3     Smokeless tobacco: Never Used     Tobacco comment: pt denied QP 20   Substance and Sexual Activity     Alcohol use: Not Currently     Comment: 1 liter of whiskey per day, last drank at 1100 3/5/2019     Drug use: No     Sexual activity: Not Currently   Lifestyle     Physical activity     Days per week: None     Minutes per session: None     Stress: None   Relationships     Social connections     Talks on phone: None     Gets together: None     Attends Worship service: None     Active member of club or organization: None     Attends meetings of clubs or organizations: None     Relationship status: None     Intimate partner violence     Fear of current or ex partner: None     Emotionally abused: None     Physically abused: None     Forced sexual activity: None   Other Topics Concern     Parent/sibling w/ CABG, MI or angioplasty before 65F 55M? Not Asked   Social History Narrative     None       ROS: 10 point ROS neg other than the symptoms noted above in the HPI.  EXAM  Constitutional: healthy, alert and no distress    Psychiatric: mentation appears normal and affect normal/bright    VASCULAR:  -Dorsalis pedis pulse non-palpable bilaterally   ---Not audible on doppler on RIGHT foot on 2020  ---Monophasic on doppler on LEFT foot on 2020  -Posterior tibial pulse +1/4 b/l  ---Monophasic on doppler bilaterally on 2020  -Hair growth Absent to b/l anterior legs and ankles  -Moderate 2+ pitting edema to bilateral foot  NEURO:  -Light  touch sensation intact to b/l plantar forefoot  DERM:  -Skin temperature very cold to bilateral foot  -Skin shiny, atrophic to bilateral foot  -Moderate dependent rubor to bilateral foot with partial maintenance of RIGHT dorsal distal half of foot when elevation but no calor (very cool temperature)  -Toenails normal length but thickened, dystrophic and discolored x 10  Wound Location:  RIGHT dorsal foot  12/18/2020  Measurement:  3cm x 5.4cm x 0.1cm to subcutaneous tissue with newly epithelialized skin to approximately 3cm of the wound medial to the open wound site (wound measurements do not include the epithelialized portion of the wound)  Drainage:  Moderate serous  Odor:  None  Undermining:  None  Edges:  Fragile  Base:  Marbled 80% fibrotic, 20% granular  Surrounding Skin: Intact  No severe erythema, no ascending erythema, no calor, no purulence, no malodor, no other SOI.   ----------------------------------------------  Wound Location:  LEFT medial hallux IPJ  12/18/2020  Measurement:  0.2cm x 0.3cm x 0.1cm to sub tissue  Drainage:  Moderate serous  Odor:  None  Undermining:  None  Edges:  Intact  Base:  100% fibrotic  Surrounding Skin: Intact  No severe erythema, no ascending erythema, no calor, no purulence, no malodor, no other SOI.   ----------------------------------------------  Wound Location:  LEFT plantar 4th metatarsal head  12/18/2020  Measurement:  0.2cm x 0.2cm x 0.2cm to subcutaneous tissue  Drainage:  Moderate serous  Odor:  None  Undermining:  None  Edges:  Intact  Base:  100% viable post debridement of moderate overlying hyperkeratotic lesion   Surrounding Skin: Intact  No severe erythema, no ascending erythema, no calor, no purulence, no malodor, no other SOI.   ----------------------------------------------  Wound Location:  LEFT posterior heel  12/18/2020  Measurement:  0.5cm x 0.3cm x 0.1cm to subcutaneous tissue  Drainage:  Moderate serous  Odor:  None  Undermining:  None post  debridement  Edges:  Intact with mild hyperkeratotic lesion   Base:  100% fibrotic  Surrounding Skin: Intact  No severe erythema, no ascending erythema, no calor, no purulence, no malodor, no other SOI.   MSK:  -Moderate pain on palpation to RIGHT dorsal foot ulceration  -Muscle strength of ankles +5/5 for dorsiflexion, plantarflexion, ABDUction and ADDuction b/l  ============================================================    ASSESSMENT:  (E11.621,  L97.412) Diabetic ulcer of right midfoot associated with type 2 diabetes mellitus, with fat layer exposed (H)  (primary encounter diagnosis)    (M79.671) Right foot pain    (E11.621,  L97.522) Diabetic ulcer of toe of left foot associated with type 2 diabetes mellitus, with fat layer exposed (H)    (E11.621,  L97.422) Diabetic ulcer of left midfoot associated with type 2 diabetes mellitus, with fat layer exposed (H)    (E11.621,  L97.422) Diabetic ulcer of left heel associated with type 2 diabetes mellitus, with fat layer exposed (H)    (E11.9,  Z79.4) Diabetes mellitus type 2, insulin dependent (H)    (I87.323) Chronic venous hypertension (idiopathic) with inflammation of bilateral lower extremity    (I70.234) Atherosclerosis of native artery of right lower extremity with ulceration of midfoot (H)      PLAN:  -Patient evaluated and examined. Treatment options discussed with no educational barriers noted.  -Discussed patient's wounds, etiology, and treatment in detail. It is unknown how her wounds started, particularly the RIGHT dorsal foot wound. She remembers the wound started as a blister which could have been initially a minor blister from rubbing in her shoe. If she had limited blood flow when the blister started, then the wound would have had difficulty healing. Patient has constant pain from the ulcer for which she takes 10mg of oxycodone. She says this still does not touch the pain and she is wondering if something else can be recommended for pain. Discussed  with patient how her pain is because of the the lack of blood flow reaching her foot and that if 10mg of oxycodone did not help, other remedies would unlikely help. She needs to see a vascular surgeon for a potential procedure to restore some blood flow to her foot and leg which my decrease some of her pain. She relates understanding and agrees with this plan.  -Patient already has a scheduled MACHELLE exam on 12/21/2020 at [facility]. Imaging was called today at [facility] and they said they could have the patient get her exam today on 12/18/2020, but not until 3:30 p.m. The patient did not want to wait at the clinic, so she decided to wait until 12/21/2020 for the test.    -Excisional debridement of wound on RIGHT dorsal foot (minimal debridement with only removal of loose, fibrotic tissue), LEFT medial hallux, LEFT plantar forefoot, and LEFT posterior heel with a sharp ring curette to subcutaneous tissue (<20 cm squared)  ---Dressed wounds on LEFT foot with Mepilex Ag and tape  ---Dressed RIGHT dorsal foot wound with Xeroform, gauze and tape  ---Dressings to be every other day (daily if draining through dressing)    -Patient was instructed to look for SOI (redness, swelling, pain, purulence, fever, chills, nausea, vomiting) and to return to podiatry or the emergency department immediately if there are any SOI.   -Patient is to decrease physical therapy (non-weightbearing activities with PT) until vascular status is known next week.    -Tobacco cessation -- The Quit Plan referral was offered and the patient is not interested in the referral today. Patient is not interested in quitting today.    -Patient in agreement with the above treatment plan and all of patient's questions were answered.      RTC one week to evaluate bilateral foot  RTC three weeks for bilateral foot        Reena Kelly DPM

## 2020-12-21 ENCOUNTER — HOSPITAL ENCOUNTER (OUTPATIENT)
Dept: ULTRASOUND IMAGING | Facility: OTHER | Age: 60
Discharge: HOME | End: 2020-12-21
Attending: INTERNAL MEDICINE | Admitting: INTERNAL MEDICINE
Payer: MEDICAID

## 2020-12-21 DIAGNOSIS — I87.8 VENOUS STASIS OF BOTH LOWER EXTREMITIES: ICD-10-CM

## 2020-12-21 DIAGNOSIS — L97.909: ICD-10-CM

## 2020-12-21 DIAGNOSIS — E11.622: ICD-10-CM

## 2020-12-21 PROCEDURE — 93922 UPR/L XTREMITY ART 2 LEVELS: CPT

## 2020-12-22 ENCOUNTER — OFFICE VISIT (OUTPATIENT)
Dept: PODIATRY | Facility: OTHER | Age: 60
End: 2020-12-22
Attending: PODIATRIST
Payer: MEDICAID

## 2020-12-22 VITALS
SYSTOLIC BLOOD PRESSURE: 132 MMHG | OXYGEN SATURATION: 98 % | TEMPERATURE: 99.5 F | DIASTOLIC BLOOD PRESSURE: 72 MMHG | HEART RATE: 92 BPM | WEIGHT: 165 LBS | BODY MASS INDEX: 25.84 KG/M2

## 2020-12-22 DIAGNOSIS — I70.234 ATHEROSCLEROSIS OF NATIVE ARTERY OF RIGHT LOWER EXTREMITY WITH ULCERATION OF MIDFOOT (H): ICD-10-CM

## 2020-12-22 DIAGNOSIS — L97.412 DIABETIC ULCER OF RIGHT MIDFOOT ASSOCIATED WITH TYPE 2 DIABETES MELLITUS, WITH FAT LAYER EXPOSED (H): Primary | ICD-10-CM

## 2020-12-22 DIAGNOSIS — E11.621 DIABETIC ULCER OF LEFT HEEL ASSOCIATED WITH TYPE 2 DIABETES MELLITUS, UNSPECIFIED ULCER STAGE (H): Primary | ICD-10-CM

## 2020-12-22 DIAGNOSIS — L97.429 DIABETIC ULCER OF LEFT HEEL ASSOCIATED WITH TYPE 2 DIABETES MELLITUS, UNSPECIFIED ULCER STAGE (H): Primary | ICD-10-CM

## 2020-12-22 DIAGNOSIS — Z79.4 DIABETES MELLITUS TYPE 2, INSULIN DEPENDENT (H): ICD-10-CM

## 2020-12-22 DIAGNOSIS — I87.323 CHRONIC VENOUS HYPERTENSION (IDIOPATHIC) WITH INFLAMMATION OF BILATERAL LOWER EXTREMITY: ICD-10-CM

## 2020-12-22 DIAGNOSIS — M79.671 RIGHT FOOT PAIN: ICD-10-CM

## 2020-12-22 DIAGNOSIS — E11.621 DIABETIC ULCER OF RIGHT MIDFOOT ASSOCIATED WITH TYPE 2 DIABETES MELLITUS, WITH FAT LAYER EXPOSED (H): Primary | ICD-10-CM

## 2020-12-22 DIAGNOSIS — E11.9 DIABETES MELLITUS TYPE 2, INSULIN DEPENDENT (H): ICD-10-CM

## 2020-12-22 PROCEDURE — 11042 DBRDMT SUBQ TIS 1ST 20SQCM/<: CPT | Performed by: PODIATRIST

## 2020-12-22 PROCEDURE — G0463 HOSPITAL OUTPT CLINIC VISIT: HCPCS | Mod: 25

## 2020-12-22 PROCEDURE — 99213 OFFICE O/P EST LOW 20 MIN: CPT | Mod: 25 | Performed by: PODIATRIST

## 2020-12-22 PROCEDURE — 11042 DBRDMT SUBQ TIS 1ST 20SQCM/<: CPT

## 2020-12-22 RX ORDER — LEVOTHYROXINE SODIUM 125 UG/1
125 TABLET ORAL EVERY MORNING
COMMUNITY
Start: 2020-12-17 | End: 2021-01-11

## 2020-12-22 RX ORDER — PROCHLORPERAZINE 25 MG/1
SUPPOSITORY RECTAL
COMMUNITY
Start: 2020-12-18 | End: 2021-01-11

## 2020-12-22 RX ORDER — LAMOTRIGINE 100 MG/1
TABLET ORAL
COMMUNITY
Start: 2020-12-17

## 2020-12-22 RX ORDER — MIRTAZAPINE 45 MG/1
TABLET, FILM COATED ORAL
COMMUNITY
Start: 2020-12-17

## 2020-12-22 RX ORDER — DULOXETIN HYDROCHLORIDE 20 MG/1
CAPSULE, DELAYED RELEASE ORAL
COMMUNITY
Start: 2020-12-17 | End: 2021-01-11

## 2020-12-22 ASSESSMENT — PAIN SCALES - GENERAL: PAINLEVEL: EXTREME PAIN (9)

## 2020-12-22 NOTE — PROGRESS NOTES
Chief complaint: Patient presents with:  foot wounds and diabetic ulcers      History of Present Illness: This 60 year old IDDM II female is seen for follow-up management of bilateral foot ulcerations.    Both feet are very painful and the RIGHT is still much more painful than the LEFT. She had ABIs yesterday on 12/21/2020 but she does not yet know the results.     Patient says she currently lives at the [facility] in [place], [place], and she has been there since around November, 2020. They have not been changing her dressing daily with the recommended dressings from her previous podiatry appointment. Patient states she gave her follow-up and dressing change instructions to an NP who permanently left the facility around the same day. She did not have transportation set up for today's appointment so she had to come later in the afternoon for her scheduled morning appointment. She says she will be scheduling her own rides from now on because her transportation was also not scheduled for her appointment with her PCP at [facility] this morning.    Overall, she complains of significant pain to the RIGHT foot dorsal ulcer. She is tired of having the RIGHT dorsal foot wound. She is currently not walking with physical therapy per her podiatry instructions, but she has been working on upper body strength with PT.    No further pedal complaints today.     Last HbA1C was 6.6% on 09/23/2020.        /72   Pulse 92   Temp 99.5  F (37.5  C) (Tympanic)   Wt 74.8 kg (165 lb)   LMP 06/26/2008 (LMP Unknown)   SpO2 98%   BMI 25.84 kg/m      Patient Active Problem List   Diagnosis     Essential hypertension     Hypothyroidism     Episodic mood disorder (H)     Pure hypercholesterolemia     Tobacco use disorder     Esophageal reflux     Allergic rhinitis     Vitamin D deficiency     Controlled type 2 diabetes mellitus with complication, with long-term current use of insulin (H)     HYPERLIPIDEMIA LDL GOAL <100      S/P gastric bypass     Malnutrition, unspecified type (H)     Venous stasis of both lower extremities     Diabetic ulcer of left heel associated with type 2 diabetes mellitus, unspecified ulcer stage (H)       History reviewed. No pertinent surgical history.    Current Outpatient Medications   Medication     acetaminophen (TYLENOL) 500 MG tablet     ACYCLOVIR 400 MG OR TABS     ASPIRIN 81 MG OR TABS     BD U/F III SHORT PEN NEEDLE 31G X 8 MM MISC     calcium carbonate (TUMS) 500 MG chewable tablet     CLARITIN-D 24 HOUR#  MG OR TB24     Continuous Blood Gluc Sensor (DEXCOM G6 SENSOR) MISC     DULoxetine (CYMBALTA) 20 MG capsule     ferrous sulfate (FEROSUL) 325 (65 Fe) MG tablet     fluticasone (VERAMYST) 27.5 MCG/SPRAY nasal spray     furosemide (LASIX) 20 MG tablet     GLUCAGON EMERGENCY 1 MG kit     Lactobacillus (LACTINEX) PACK     lamoTRIgine (LAMICTAL) 100 MG tablet     lamoTRIgine (LAMICTAL) 150 MG tablet     LANTUS SOLOSTAR 100 UNIT/ML SC SOLN     levothyroxine (SYNTHROID/LEVOTHROID) 125 MCG tablet     LISINOPRIL 10 MG OR TABS     mirtazapine (REMERON) 15 MG tablet     mirtazapine (REMERON) 45 MG tablet     MULTIVITAMIN/IRON OR TABS     naltrexone (DEPADE/REVIA) 50 MG tablet     NOVOLOG FLEXPEN 100 UNIT/ML SC SOLN     omeprazole (PRILOSEC) 40 MG DR capsule     ONE TOUCH ULTRA BONUS PACK STRP   VI     ONETOUCH ULTRASOFT LANCETS MISC     oxyCODONE (ROXICODONE) 5 MG tablet     potassium chloride ER (KLOR-CON M) 20 MEQ CR tablet     QUEtiapine (SEROQUEL) 25 MG tablet     vitamin D3 (CHOLECALCIFEROL) 2000 units tablet     No current facility-administered medications for this visit.           Allergies   Allergen Reactions     Seasonal Allergies      Sulfa Drugs Rash     Vicodin [Hydrocodone-Acetaminophen] Rash       Family History   Problem Relation Age of Onset     Hypertension Mother      Diabetes Mother      Asthma Father      Diabetes Father      Hypertension Father      Cerebrovascular Disease  Father      Circulatory Father      Eye Disorder Father        Social History     Socioeconomic History     Marital status:      Spouse name: None     Number of children: None     Years of education: None     Highest education level: None   Occupational History     None   Social Needs     Financial resource strain: None     Food insecurity     Worry: None     Inability: None     Transportation needs     Medical: None     Non-medical: None   Tobacco Use     Smoking status: Current Every Day Smoker     Packs/day: 0.50     Years: 46.00     Pack years: 23.00     Last attempt to quit: 2008     Years since quittin.3     Smokeless tobacco: Never Used     Tobacco comment: pt denied QP 20   Substance and Sexual Activity     Alcohol use: Not Currently     Comment: 1 liter of whiskey per day, last drank at 1100 3/5/2019     Drug use: No     Sexual activity: Not Currently   Lifestyle     Physical activity     Days per week: None     Minutes per session: None     Stress: None   Relationships     Social connections     Talks on phone: None     Gets together: None     Attends [employer] service: None     Active member of club or organization: None     Attends meetings of clubs or organizations: None     Relationship status: None     Intimate partner violence     Fear of current or ex partner: None     Emotionally abused: None     Physically abused: None     Forced sexual activity: None   Other Topics Concern     Parent/sibling w/ CABG, MI or angioplasty before 65F 55M? Not Asked   Social History Narrative     None       ROS: 10 point ROS neg other than the symptoms noted above in the HPI.  EXAM  Constitutional: healthy, alert and no distress    Psychiatric: mentation appears normal and affect normal/bright    VASCULAR:  -Dorsalis pedis pulse non-palpable bilaterally   ---Not audible on doppler on RIGHT foot on 2020  ---Monophasic on doppler on LEFT foot on 2020  -Posterior tibial pulse +1/4  b/l  ---Monophasic on doppler bilaterally on 12/18/2020  -Hair growth Absent to b/l anterior legs and ankles  -Moderate 2+ pitting edema to bilateral foot  NEURO:  -Light touch sensation intact to b/l plantar forefoot  DERM:  -Skin temperature very cold to bilateral foot  -Skin shiny, atrophic to bilateral foot  -Moderate dependent rubor to bilateral foot with partial maintenance of RIGHT dorsal distal half of foot when elevation but no calor (very cool temperature)  -Toenails normal length but thickened, dystrophic and discolored x 10  Wound Location:  RIGHT dorsal foot  12/18/2020  Measurement:  2.4cm x 5.8cm x 0.1cm to subcutaneous tissue with newly epithelialized skin to the proximal wound margins and to approximately 3cm of the recently healed wound medial to the open wound site (wound measurements do not include the epithelialized portion of the wound)  Drainage:  Moderate serous  Odor:  None  Undermining:  None  Edges:  Fragile  Base:  Marbled 80% fibrotic, 20% granular  Surrounding Skin: Intact  No severe erythema, no ascending erythema, no calor, no purulence, no malodor, no other SOI.     12/18/2020  Measurement:  3cm x 5.4cm x 0.1cm to subcutaneous tissue with newly epithelialized skin to approximately 3cm of the wound medial to the open wound site (wound measurements do not include the epithelialized portion of the wound)  Drainage:  Moderate serous  Odor:  None  Undermining:  None  Edges:  Fragile  Base:  Marbled 80% fibrotic, 20% granular  Surrounding Skin: Intact  No severe erythema, no ascending erythema, no calor, no purulence, no malodor, no other SOI.   ----------------------------------------------  Wound Location:  LEFT medial hallux IPJ  12/22/2020: 0.2cm x 0.3cm with a 100% well-adhered scab with no drainage    12/18/2020  Measurement:  0.2cm x 0.3cm x 0.1cm to sub tissue  Drainage:  Moderate serous  Odor:  None  Undermining:  None  Edges:  Intact  Base:  100% fibrotic  Surrounding Skin:  Intact  No severe erythema, no ascending erythema, no calor, no purulence, no malodor, no other SOI.   ----------------------------------------------  Wound Location:  LEFT plantar 4th metatarsal head  12/22/2020:  0.2cm x 0.2cm with a 100% well-adhered scab with no drainage    12/18/2020  Measurement:  0.2cm x 0.2cm x 0.2cm to subcutaneous tissue   Drainage:  Moderate serous  Odor:  None  Undermining:  None  Edges:  Intact  Base:  100% viable post debridement of moderate overlying hyperkeratotic lesion   Surrounding Skin: Intact  No severe erythema, no ascending erythema, no calor, no purulence, no malodor, no other SOI.   ----------------------------------------------  Wound Location:  LEFT posterior heel  12/22/2020:  0.5cm x 0.3cm with a 100% well-adhered scab with no drainage    12/18/2020  Measurement:  0.5cm x 0.3cm x 0.1cm to subcutaneous tissue  Drainage:  Moderate serous  Odor:  None  Undermining:  None post debridement  Edges:  Intact with mild hyperkeratotic lesion   Base:  100% fibrotic  Surrounding Skin: Intact  No severe erythema, no ascending erythema, no calor, no purulence, no malodor, no other SOI.   MSK:  -Moderate pain on palpation to RIGHT dorsal foot ulceration  -Muscle strength of ankles +5/5 for dorsiflexion, plantarflexion, ABDUction and ADDuction b/l    MACHELLE 12/22/2020  FINDINGS:   The right dorsal pedis and posterior tibial artery are noncompressible. Brachial pressures are 138.   The left ankle-brachial index is 1.5.   IMPRESSION: Above findings are compatible with atherosclerotic disease with elevated measurements on the left suggesting non-compressible disease and noncompressible disease demonstrated on the right.  KEELEY BURNS MD  ============================================================    ASSESSMENT:  (E11.621,  L97.412) Diabetic ulcer of right midfoot associated with type 2 diabetes mellitus, with fat layer exposed (H)  (primary encounter diagnosis)    (M79.901) Right foot  pain    (E11.9,  Z79.4) Diabetes mellitus type 2, insulin dependent (H)    (I87.323) Chronic venous hypertension (idiopathic) with inflammation of bilateral lower extremity    (I70.234) Atherosclerosis of native artery of right lower extremity with ulceration of midfoot (H)      PLAN:  -Patient evaluated and examined. Treatment options discussed with no educational barriers noted.  -All LEFT foot wounds have a well-adhered scab with no drainage today and no SOI.  -RIGHT foot dorsal ulcer is similar in appearance with moderate pain to the wound and with debridement of the wound.  -Discussed patient's falsely elevated MACHELLE readings from 12/21/2020. The vascular surgeon through [facility], Dr. Laquita Yates, was contacted on 12/22/2020 regarding patient's MACHELLE results. She is recommending and she has ordered a CTA to determine blood flow status. If there is a blockage, patient may be a good vascular intervention candidate. If there is not a blockage, patient may need to see Rheumatology to address medications for her micro vascular disease.  ---Patient will be receiving a phone call to schedule the CTA.    -Excisional debridement of wound on RIGHT dorsal foot (care taken not to do aggressive debridement due to unknown blood flow status) with a sharp ring curette to subcutaneous tissue (<20 cm squared)  ---LEFT foot wounds have no drainage and well-adhered scabs -- no dressings applied today  ---Due to tenderness, applied topical Lidocaine over the RIGHT dorsal foot ulcer followed by oil emulsion gauze and Medipore tape  ---Dressings to be performed daily with oil emulsion gauze, dry gauzes and tape    -Patient was instructed to look for SOI (redness, swelling, pain, purulence, fever, chills, nausea, vomiting) and to return to podiatry or the emergency department immediately if there are any SOI.     -Patient is to continue to decrease physical therapy (non-weightbearing activities with PT) until vascular status is known next  week.    -Tobacco cessation -- The Quit Plan referral was offered and the patient is not interested in the referral today. Patient is not interested in quitting today.    -Patient in agreement with the above treatment plan and all of patient's questions were answered.      RTC two weeks to evaluate RIGHT dorsal foot ulcer    Reena Kelly DPM

## 2020-12-22 NOTE — NURSING NOTE
"Chief Complaint   Patient presents with     foot wounds and diabetic ulcers       Initial /72   Pulse 92   Temp 99.5  F (37.5  C) (Tympanic)   Wt 74.8 kg (165 lb)   LMP 06/26/2008 (LMP Unknown)   SpO2 98%   BMI 25.84 kg/m   Estimated body mass index is 25.84 kg/m  as calculated from the following:    Height as of 12/4/20: 1.702 m (5' 7\").    Weight as of this encounter: 74.8 kg (165 lb).  Medication Reconciliation: complete  Graciela Hodge LPN    "

## 2020-12-28 ENCOUNTER — HOSPITAL ENCOUNTER (OUTPATIENT)
Dept: CARDIOLOGY | Facility: OTHER | Age: 60
End: 2020-12-28
Attending: INTERNAL MEDICINE
Payer: MEDICAID

## 2020-12-28 ENCOUNTER — OFFICE VISIT (OUTPATIENT)
Dept: PEDIATRICS | Facility: OTHER | Age: 60
End: 2020-12-28
Attending: INTERNAL MEDICINE
Payer: MEDICAID

## 2020-12-28 VITALS
TEMPERATURE: 97.1 F | RESPIRATION RATE: 18 BRPM | DIASTOLIC BLOOD PRESSURE: 92 MMHG | WEIGHT: 154.1 LBS | BODY MASS INDEX: 24.14 KG/M2 | HEART RATE: 73 BPM | SYSTOLIC BLOOD PRESSURE: 136 MMHG | OXYGEN SATURATION: 99 %

## 2020-12-28 DIAGNOSIS — E03.9 HYPOTHYROIDISM, UNSPECIFIED TYPE: ICD-10-CM

## 2020-12-28 DIAGNOSIS — E11.8 CONTROLLED TYPE 2 DIABETES MELLITUS WITH COMPLICATION, WITH LONG-TERM CURRENT USE OF INSULIN (H): Primary | Chronic | ICD-10-CM

## 2020-12-28 DIAGNOSIS — I73.9 PERIPHERAL VASCULAR DISEASE (H): ICD-10-CM

## 2020-12-28 DIAGNOSIS — I87.8 VENOUS STASIS OF BOTH LOWER EXTREMITIES: ICD-10-CM

## 2020-12-28 DIAGNOSIS — D53.9 MACROCYTIC ANEMIA: ICD-10-CM

## 2020-12-28 DIAGNOSIS — I73.9 CLAUDICATION IN PERIPHERAL VASCULAR DISEASE (H): ICD-10-CM

## 2020-12-28 DIAGNOSIS — Z79.4 CONTROLLED TYPE 2 DIABETES MELLITUS WITH COMPLICATION, WITH LONG-TERM CURRENT USE OF INSULIN (H): Primary | Chronic | ICD-10-CM

## 2020-12-28 DIAGNOSIS — E88.09 HYPOALBUMINEMIA: ICD-10-CM

## 2020-12-28 LAB
ALBUMIN UR-MCNC: NEGATIVE MG/DL
ANION GAP SERPL CALCULATED.3IONS-SCNC: 6 MMOL/L (ref 3–14)
APPEARANCE UR: CLEAR
BACTERIA #/AREA URNS HPF: ABNORMAL /HPF
BILIRUB UR QL STRIP: NEGATIVE
BUN SERPL-MCNC: 14 MG/DL (ref 7–25)
CALCIUM SERPL-MCNC: 8.1 MG/DL (ref 8.6–10.3)
CHLORIDE SERPL-SCNC: 104 MMOL/L (ref 98–107)
CO2 SERPL-SCNC: 27 MMOL/L (ref 21–31)
COLOR UR AUTO: ABNORMAL
CREAT SERPL-MCNC: 0.66 MG/DL (ref 0.6–1.2)
ERYTHROCYTE [DISTWIDTH] IN BLOOD BY AUTOMATED COUNT: 12.5 % (ref 10–15)
FOLATE SERPL-MCNC: 17.1 NG/ML
GFR SERPL CREATININE-BSD FRML MDRD: >90 ML/MIN/{1.73_M2}
GLUCOSE SERPL-MCNC: 185 MG/DL (ref 70–105)
GLUCOSE UR STRIP-MCNC: NEGATIVE MG/DL
HBA1C MFR BLD: 6.3 % (ref 4–6)
HCT VFR BLD AUTO: 31.7 % (ref 35–47)
HGB BLD-MCNC: 10.8 G/DL (ref 11.7–15.7)
HGB UR QL STRIP: NEGATIVE
HYALINE CASTS #/AREA URNS LPF: 7 /LPF (ref 0–2)
KETONES UR STRIP-MCNC: NEGATIVE MG/DL
LEUKOCYTE ESTERASE UR QL STRIP: NEGATIVE
MCH RBC QN AUTO: 34.3 PG (ref 26.5–33)
MCHC RBC AUTO-ENTMCNC: 34.1 G/DL (ref 31.5–36.5)
MCV RBC AUTO: 101 FL (ref 78–100)
MUCOUS THREADS #/AREA URNS LPF: PRESENT /LPF
NITRATE UR QL: NEGATIVE
PH UR STRIP: 5.5 PH (ref 5–7)
PLATELET # BLD AUTO: 266 10E9/L (ref 150–450)
POTASSIUM SERPL-SCNC: 4.3 MMOL/L (ref 3.5–5.1)
RBC # BLD AUTO: 3.15 10E12/L (ref 3.8–5.2)
RBC #/AREA URNS AUTO: <1 /HPF (ref 0–2)
SODIUM SERPL-SCNC: 137 MMOL/L (ref 134–144)
SOURCE: ABNORMAL
SP GR UR STRIP: 1.01 (ref 1–1.03)
TSH SERPL DL<=0.05 MIU/L-ACNC: 12.66 IU/ML (ref 0.34–5.6)
UROBILINOGEN UR STRIP-MCNC: NORMAL MG/DL (ref 0–2)
VIT B12 SERPL-MCNC: 437 PG/ML (ref 180–914)
WBC # BLD AUTO: 5.4 10E9/L (ref 4–11)
WBC #/AREA URNS AUTO: 1 /HPF (ref 0–5)

## 2020-12-28 PROCEDURE — 36415 COLL VENOUS BLD VENIPUNCTURE: CPT | Mod: ZL | Performed by: INTERNAL MEDICINE

## 2020-12-28 PROCEDURE — 93306 TTE W/DOPPLER COMPLETE: CPT | Mod: 26 | Performed by: INTERNAL MEDICINE

## 2020-12-28 PROCEDURE — 82607 VITAMIN B-12: CPT | Mod: ZL | Performed by: INTERNAL MEDICINE

## 2020-12-28 PROCEDURE — 82746 ASSAY OF FOLIC ACID SERUM: CPT | Mod: ZL | Performed by: INTERNAL MEDICINE

## 2020-12-28 PROCEDURE — 85027 COMPLETE CBC AUTOMATED: CPT | Mod: ZL | Performed by: INTERNAL MEDICINE

## 2020-12-28 PROCEDURE — 99214 OFFICE O/P EST MOD 30 MIN: CPT | Performed by: INTERNAL MEDICINE

## 2020-12-28 PROCEDURE — 93306 TTE W/DOPPLER COMPLETE: CPT

## 2020-12-28 PROCEDURE — 84443 ASSAY THYROID STIM HORMONE: CPT | Mod: ZL | Performed by: INTERNAL MEDICINE

## 2020-12-28 PROCEDURE — 83036 HEMOGLOBIN GLYCOSYLATED A1C: CPT | Mod: ZL | Performed by: INTERNAL MEDICINE

## 2020-12-28 PROCEDURE — 81001 URINALYSIS AUTO W/SCOPE: CPT | Mod: ZL | Performed by: INTERNAL MEDICINE

## 2020-12-28 PROCEDURE — 80048 BASIC METABOLIC PNL TOTAL CA: CPT | Mod: ZL | Performed by: INTERNAL MEDICINE

## 2020-12-28 PROCEDURE — G0463 HOSPITAL OUTPT CLINIC VISIT: HCPCS | Mod: 25

## 2020-12-28 PROCEDURE — G0463 HOSPITAL OUTPT CLINIC VISIT: HCPCS

## 2020-12-28 ASSESSMENT — PAIN SCALES - GENERAL: PAINLEVEL: EXTREME PAIN (8)

## 2020-12-28 NOTE — PATIENT INSTRUCTIONS
-- Vascular surgery consult   -- Diabetes Ed consult   -- Schedule med check with Dr. Castañeda, bring current med list, MAR, glucose readings, etc

## 2020-12-28 NOTE — NURSING NOTE
"Chief Complaint   Patient presents with     Recheck Medication     Patient is here for medication recheck and follow up from 12/1/2020. She thinks she has a UTI as she has dysuria. She also states the wound on her foot hurts and is not getting better.      Initial LMP 06/26/2008 (LMP Unknown)  Estimated body mass index is 25.84 kg/m  as calculated from the following:    Height as of 12/4/20: 1.702 m (5' 7\").    Weight as of 12/22/20: 74.8 kg (165 lb).  Medication Reconciliation: incomplete- no medication list sent with patient from [facility]  "

## 2020-12-29 NOTE — PROGRESS NOTES
Subjective  Funmilayo Stratton is a 60 year old female who presents for med check, establish primary care.  She is living at the nursing home.  She has no documentation or paperwork.  Her medications in our electronic medical record sound to be widely different from what she is taking at home.  For example we have Lantus 50 units daily, and she says she is taking 4 units daily.  Her blood sugars have been up and down.  She does not know what her glucose readings have been.  Her foot is continuing to heal.  She is grateful to have seen the foot doctor.  She continues to have cold lower extremities.  She wants to see the specialist if recommended.    Problem List/PMH: reviewed in EMR, and made relevant updates today.  Medications: reviewed in EMR, and made relevant updates today.  Allergies: reviewed in EMR, and made relevant updates today.    Social Hx:  Social History     Tobacco Use     Smoking status: Current Every Day Smoker     Packs/day: 0.50     Years: 46.00     Pack years: 23.00     Last attempt to quit: 2008     Years since quittin.3     Smokeless tobacco: Never Used     Tobacco comment: pt said shes working on it on her own   Substance Use Topics     Alcohol use: Not Currently     Comment: 1 liter of whiskey per day, last drank at 1100 3/5/2019     Drug use: No     Social History     Social History Narrative     Not on file     I reviewed social history and made relevant updates today.    Family Hx:   Family History   Problem Relation Age of Onset     Hypertension Mother      Diabetes Mother      Asthma Father      Diabetes Father      Hypertension Father      Cerebrovascular Disease Father      Circulatory Father      Eye Disorder Father        Objective  Vitals: reviewed in EMR.  BP (!) 136/92 (BP Location: Right arm, Patient Position: Sitting, Cuff Size: Adult Regular)   Pulse 73   Temp 97.1  F (36.2  C) (Tympanic)   Resp 18   Wt 69.9 kg (154 lb 1.6 oz)   LMP 2008 (LMP Unknown)   SpO2  99%   Breastfeeding No   BMI 24.14 kg/m      Gen: Pleasant female, NAD.  HEENT: MMM  Neck: Supple  Pulm: Breathing easily  Neuro: Grossly intact  Skin: No concerning lesions.  Psychiatric: Normal affect and insight. Does not appear anxious or depressed.      Recent Results (from the past 744 hour(s))   XR Foot Right G/E 3 Views    Narrative    PROCEDURE: XR FOOT RT G/E 3 VW 2020 2:18 AM    HISTORY: right foot infection, evaluate for changes of osteomyelitis    COMPARISONS: None.    TECHNIQUE: 3 views.    FINDINGS: No fracture or dislocation is seen. No bone destruction is  seen to suggest osteomyelitis.    Vascular calcification is seen.    There is a plantar calcaneal spur.         Impression    IMPRESSION: No findings to suggest osteomyelitis.    ERWIN AMBROCIO MD   US MACHELLE Doppler No Exercise 1-2 Levels Bilateral    Narrative    US MACHELLE DOPPLER NO EXERCISE, 1-2 LEVELS,??? BILAT    HISTORY: 60 years Female Venous stasis of both lower extremities;  Diabetes mellitus with ulcer of lower extremity (H); Diabetes mellitus  with ulcer of lower extremity (H)    COMPARISON: None    TECHNIQUE: Ultrasound MACHELLE evaluation per    FINDINGS:     The right dorsal pedis and posterior tibial artery are  noncompressible. Brachial pressures are 138.    The left ankle-brachial index is 1.5.      Impression    IMPRESSION: Above findings are compatible with atherosclerotic disease  with elevated measurements on the left suggesting noncompressible  disease and noncompressible disease demonstrated on the right.    KEELEY BURNS MD   Echocardiogram Complete    Narrative    [id number]  [id number]  [id number]  [id number]^ARLEY^ALEKS^[facility]  [address] [place] [address]     Name: GAEL BARRIENTOS  MRN: [id number]  : 1960  Study Date: 2020 01:16 PM  Age: 60 yrs  Gender: Female  Patient Location: [facility]  Reason For Study: Venous stasis of both lower extremities  Ordering  Physician: ALEKS TUBBS  Referring Physician: ALEKS TUBBS  Performed By: Becca Omer, [facility], RVT     BSA: 1.9 m2  Height: 67 in  Weight: 165 lb  HR: 83  BP: 132/72 mmHg  _____________________________________________________________________________  __        Procedure  Echocardiogram with two-dimensional, color and spectral Doppler performed.  _____________________________________________________________________________  __        Interpretation Summary  Left ventricular function, chamber size, wall motion, and wall thickness are  normal.The EF is > 65%.     Right ventricular function, chamber size, wall motion, and thickness are  normal.     IVC diameter <2.1 cm collapsing >50% with sniff suggests a normal RA pressure  of 3 mmHg.     No pericardial effusion is present.     There is no prior study for direct comparison.  _____________________________________________________________________________  __        Left Ventricle  Left ventricular function, chamber size, wall motion, and wall thickness are  normal.The EF is > 65%. Left ventricular diastolic function is normal.     Right Ventricle  Right ventricular function, chamber size, wall motion, and thickness are  normal.     Atria  Both atria appear normal. The atrial septum is intact as assessed by color  Doppler .     Mitral Valve  Mild to moderate mitral annular calcification is present. Trace to mild mitral  insufficiency is present.     Aortic Valve  Mild to moderate calcification of the aortic valve leaflets. The aortic valve  is tricuspid.        Tricuspid Valve  The tricuspid valve is normal. Trace to mild tricuspid insufficiency is  present. Right ventricular systolic pressure is 24.1mmHg above the right  atrial pressure.     Pulmonic Valve  The pulmonic valve is normal.     Vessels  Aortic arch 3.5 cm. Sinuses of Valsalva 2.9 cm. IVC diameter <2.1 cm  collapsing >50% with sniff suggests a normal RA pressure of 3 mmHg.     Pericardium  No  pericardial effusion is present.     Compared to Previous Study  There is no prior study for direct comparison.     _____________________________________________________________________________  __  MMode/2D Measurements & Calculations  IVSd: 0.98 cm  LVIDd: 3.1 cm  LVIDs: 2.1 cm  LVPWd: 0.72 cm  FS: 31.0 %  LV mass(C)d: 66.5 grams  LV mass(C)dI: 35.7 grams/m2     Ao root diam: 2.9 cm  asc Aorta Diam: 3.5 cm  LVOT diam: 2.2 cm  LVOT area: 3.8 cm2  LA Volume (BP): 39.8 ml  LA Volume Index (BP): 21.4 ml/m2  RWT: 0.47        Doppler Measurements & Calculations  MV E max darnell: 63.0 cm/sec  MV A max darnell: 81.8 cm/sec  MV E/A: 0.77  MV dec slope: 287.0 cm/sec2  MV dec time: 0.22 sec     Ao V2 max: 132.0 cm/sec  Ao max P.0 mmHg  Ao V2 mean: 92.3 cm/sec  Ao mean P.0 mmHg  Ao V2 VTI: 27.2 cm  YINA(I,D): 3.6 cm2  YINA(V,D): 3.5 cm2  LV V1 max P.8 mmHg  LV V1 max: 120.0 cm/sec  LV V1 VTI: 26.0 cm  SV(LVOT): 98.8 ml  SI(LVOT): 53.0 ml/m2  TR max darnell: 245.5 cm/sec  TR max P.1 mmHg  AV Darnell Ratio (DI): 0.91  YINA Index (cm2/m2): 2.0  E/E' av.2  Lateral E/e': 6.3  Medial E/e': 6.2           _____________________________________________________________________________  __           Report approved by: Daniel Meyers 2020 02:10 PM            Assessment    ICD-10-CM    1. Controlled type 2 diabetes mellitus with complication, with long-term current use of insulin (H)  E11.8 Hemoglobin A1c    Z79.4 AMBULATORY ADULT DIABETES EDUCATOR REFERRAL     Basic metabolic panel     Basic metabolic panel     Hemoglobin A1c   2. Hypothyroidism, unspecified type  E03.9 Thyrotropin GH     Thyrotropin GH   3. Screen for colon cancer  Z12.11    4. Screening for breast cancer  Z12.39    5. Hypoalbuminemia  E88.09 UA with Microscopic   6. Peripheral vascular disease (H)  I73.9 VASCULAR SURGERY REFERRAL   7. Claudication in peripheral vascular disease (H)  I73.9 VASCULAR SURGERY REFERRAL   8. Macrocytic anemia  D53.9 CBC with  platelets     Vitamin B12     Folic Acid     Folic Acid     Vitamin B12     CBC with platelets     Orders Placed This Encounter   Procedures     Hemoglobin A1c     CBC with platelets     Basic metabolic panel     Thyrotropin GH     Vitamin B12     Folic Acid     VASCULAR SURGERY REFERRAL     AMBULATORY ADULT DIABETES EDUCATOR REFERRAL       We reviewed her recently available echocardiogram, peripheral vascular studies, laboratory work.  It is difficult for me to make any adjustments or recommendations at this time because it is unclear exactly what her current medication regimen is.  I have asked her to return to see me in the near future with her active medication list from the nursing home to do a medication reconciliation and consider some adjustments at that time.  I did encourage her to work on weaning down from her oxycodone if possible.    Plan   -- Expected clinical course discussed   -- Medications and their side effects discussed  Patient Instructions    -- Vascular surgery consult   -- Diabetes Ed consult   -- Schedule med check with Dr. Castañeda, bring current med list, MAR, glucose readings, etc      Return in about 2 weeks (around 1/11/2021) for med management.    Signed, Abiel Castañeda MD, FAAP, FACP  Internal Medicine & Pediatrics

## 2020-12-31 ENCOUNTER — MEDICAL CORRESPONDENCE (OUTPATIENT)
Dept: HEALTH INFORMATION MANAGEMENT | Facility: OTHER | Age: 60
End: 2020-12-31

## 2020-12-31 ENCOUNTER — RESULTS ONLY (OUTPATIENT)
Dept: LAB | Age: 60
End: 2020-12-31

## 2021-01-01 LAB
SARS-COV-2 RNA SPEC QL NAA+PROBE: NOT DETECTED
SPECIMEN SOURCE: NORMAL

## 2021-01-05 ENCOUNTER — HOSPITAL ENCOUNTER (OUTPATIENT)
Dept: RESPIRATORY THERAPY | Facility: OTHER | Age: 61
Discharge: HOME | End: 2021-01-05
Attending: INTERNAL MEDICINE | Admitting: INTERNAL MEDICINE
Payer: MEDICAID

## 2021-01-05 ENCOUNTER — TELEPHONE (OUTPATIENT)
Dept: PEDIATRICS | Facility: OTHER | Age: 61
End: 2021-01-05

## 2021-01-05 DIAGNOSIS — F17.200 TOBACCO USE DISORDER: ICD-10-CM

## 2021-01-05 DIAGNOSIS — G89.29 OTHER CHRONIC PAIN: Primary | ICD-10-CM

## 2021-01-05 PROCEDURE — 94726 PLETHYSMOGRAPHY LUNG VOLUMES: CPT | Mod: 26 | Performed by: INTERNAL MEDICINE

## 2021-01-05 PROCEDURE — 999N000157 HC STATISTIC RCP TIME EA 10 MIN

## 2021-01-05 PROCEDURE — 94729 DIFFUSING CAPACITY: CPT

## 2021-01-05 PROCEDURE — 94010 BREATHING CAPACITY TEST: CPT | Mod: 26 | Performed by: INTERNAL MEDICINE

## 2021-01-05 PROCEDURE — 94729 DIFFUSING CAPACITY: CPT | Mod: 26 | Performed by: INTERNAL MEDICINE

## 2021-01-05 PROCEDURE — 94726 PLETHYSMOGRAPHY LUNG VOLUMES: CPT

## 2021-01-05 PROCEDURE — 94010 BREATHING CAPACITY TEST: CPT

## 2021-01-05 NOTE — TELEPHONE ENCOUNTER
-- Stop snacking with carb containing foods   -- Exercise   -- Dietician consult   -- Follow-up on Monday as planned    Signed, Abiel Castañeda MD, FAAP, FACP  Internal Medicine & Pediatrics

## 2021-01-05 NOTE — TELEPHONE ENCOUNTER
Just an FYI patient was at 454 on her sliding scale for diabetic, after insulin it came down to 354. Patient will skip dinner and snack all night that's why they are figuring it is high.

## 2021-01-06 RX ORDER — OXYCODONE HYDROCHLORIDE 5 MG/1
5 TABLET ORAL EVERY 6 HOURS PRN
Qty: 56 TABLET | Refills: 0 | Status: SHIPPED | OUTPATIENT
Start: 2021-01-06 | End: 2021-01-11

## 2021-01-06 NOTE — TELEPHONE ENCOUNTER
Routing refill request to provider for review/approval because:  Drug not on the [facility] refill protocol     LOV: 12/28/2020  Per LOV patient noted to be nursing home patient.    Patient noted to have appt 1/11/2021  Liza Chi RN on 1/6/2021 at 11:14 AM

## 2021-01-06 NOTE — TELEPHONE ENCOUNTER
ICD-10-CM    1. Other chronic pain  G89.29 oxyCODONE (ROXICODONE) 5 MG tablet      Orders Placed This Encounter   Medications     oxyCODONE (ROXICODONE) 5 MG tablet     Sig: Take 1 tablet (5 mg) by mouth every 6 hours as needed for severe pain     Dispense:  56 tablet     Refill:  0      -- 2 week supply   -- Needs OV before future refills    Signed, Abiel Castañeda MD, FAAP, FACP  Internal Medicine & Pediatrics

## 2021-01-07 ENCOUNTER — HOSPITAL ENCOUNTER (EMERGENCY)
Facility: OTHER | Age: 61
Discharge: NURSING, REHAB OR LONG-TERM CARE | End: 2021-01-07
Attending: EMERGENCY MEDICINE | Admitting: EMERGENCY MEDICINE
Payer: MEDICAID

## 2021-01-07 VITALS
SYSTOLIC BLOOD PRESSURE: 147 MMHG | TEMPERATURE: 98.4 F | WEIGHT: 143 LBS | HEIGHT: 67 IN | DIASTOLIC BLOOD PRESSURE: 79 MMHG | HEART RATE: 67 BPM | RESPIRATION RATE: 16 BRPM | OXYGEN SATURATION: 100 % | BODY MASS INDEX: 22.44 KG/M2

## 2021-01-07 DIAGNOSIS — I82.4Y3 ACUTE DEEP VEIN THROMBOSIS (DVT) OF PROXIMAL VEIN OF BOTH LOWER EXTREMITIES (H): ICD-10-CM

## 2021-01-07 DIAGNOSIS — E10.621 DIABETIC ULCER OF RIGHT MIDFOOT ASSOCIATED WITH TYPE 1 DIABETES MELLITUS, WITH FAT LAYER EXPOSED (H): ICD-10-CM

## 2021-01-07 DIAGNOSIS — L97.412 DIABETIC ULCER OF RIGHT MIDFOOT ASSOCIATED WITH TYPE 1 DIABETES MELLITUS, WITH FAT LAYER EXPOSED (H): ICD-10-CM

## 2021-01-07 DIAGNOSIS — L03.115 CELLULITIS OF RIGHT LOWER EXTREMITY: ICD-10-CM

## 2021-01-07 LAB
ALBUMIN SERPL-MCNC: 2.8 G/DL (ref 3.5–5.7)
ALP SERPL-CCNC: 150 U/L (ref 34–104)
ALT SERPL W P-5'-P-CCNC: 79 U/L (ref 7–52)
ANION GAP SERPL CALCULATED.3IONS-SCNC: 7 MMOL/L (ref 3–14)
APTT PPP: 24 SEC (ref 22–37)
AST SERPL W P-5'-P-CCNC: 71 U/L (ref 13–39)
BASOPHILS # BLD AUTO: 0 10E9/L (ref 0–0.2)
BASOPHILS NFR BLD AUTO: 0.8 %
BILIRUB SERPL-MCNC: 0.3 MG/DL (ref 0.3–1)
BUN SERPL-MCNC: 18 MG/DL (ref 7–25)
CALCIUM SERPL-MCNC: 8.6 MG/DL (ref 8.6–10.3)
CHLORIDE SERPL-SCNC: 103 MMOL/L (ref 98–107)
CO2 SERPL-SCNC: 28 MMOL/L (ref 21–31)
CREAT SERPL-MCNC: 0.76 MG/DL (ref 0.6–1.2)
DIFFERENTIAL METHOD BLD: ABNORMAL
EOSINOPHIL # BLD AUTO: 0 10E9/L (ref 0–0.7)
EOSINOPHIL NFR BLD AUTO: 0.6 %
ERYTHROCYTE [DISTWIDTH] IN BLOOD BY AUTOMATED COUNT: 12.5 % (ref 10–15)
GFR SERPL CREATININE-BSD FRML MDRD: 78 ML/MIN/{1.73_M2}
GLUCOSE SERPL-MCNC: 152 MG/DL (ref 70–105)
HCT VFR BLD AUTO: 35.8 % (ref 35–47)
HGB BLD-MCNC: 11.8 G/DL (ref 11.7–15.7)
IMM GRANULOCYTES # BLD: 0 10E9/L (ref 0–0.4)
IMM GRANULOCYTES NFR BLD: 0.2 %
INR PPP: 0.82 (ref 0.86–1.14)
LYMPHOCYTES # BLD AUTO: 1.6 10E9/L (ref 0.8–5.3)
LYMPHOCYTES NFR BLD AUTO: 33.7 %
MCH RBC QN AUTO: 33.1 PG (ref 26.5–33)
MCHC RBC AUTO-ENTMCNC: 33 G/DL (ref 31.5–36.5)
MCV RBC AUTO: 100 FL (ref 78–100)
MONOCYTES # BLD AUTO: 0.4 10E9/L (ref 0–1.3)
MONOCYTES NFR BLD AUTO: 7.4 %
NEUTROPHILS # BLD AUTO: 2.7 10E9/L (ref 1.6–8.3)
NEUTROPHILS NFR BLD AUTO: 57.3 %
PLATELET # BLD AUTO: 239 10E9/L (ref 150–450)
POTASSIUM SERPL-SCNC: 4.1 MMOL/L (ref 3.5–5.1)
PROT SERPL-MCNC: 5.5 G/DL (ref 6.4–8.9)
RBC # BLD AUTO: 3.57 10E12/L (ref 3.8–5.2)
SODIUM SERPL-SCNC: 138 MMOL/L (ref 134–144)
WBC # BLD AUTO: 4.7 10E9/L (ref 4–11)

## 2021-01-07 PROCEDURE — 85730 THROMBOPLASTIN TIME PARTIAL: CPT | Performed by: EMERGENCY MEDICINE

## 2021-01-07 PROCEDURE — 80053 COMPREHEN METABOLIC PANEL: CPT | Performed by: EMERGENCY MEDICINE

## 2021-01-07 PROCEDURE — 99283 EMERGENCY DEPT VISIT LOW MDM: CPT | Performed by: EMERGENCY MEDICINE

## 2021-01-07 PROCEDURE — 85610 PROTHROMBIN TIME: CPT | Performed by: EMERGENCY MEDICINE

## 2021-01-07 PROCEDURE — 36415 COLL VENOUS BLD VENIPUNCTURE: CPT | Performed by: EMERGENCY MEDICINE

## 2021-01-07 PROCEDURE — 99283 EMERGENCY DEPT VISIT LOW MDM: CPT | Mod: 25 | Performed by: INTERNAL MEDICINE

## 2021-01-07 PROCEDURE — 85025 COMPLETE CBC W/AUTO DIFF WBC: CPT | Performed by: EMERGENCY MEDICINE

## 2021-01-07 ASSESSMENT — MIFFLIN-ST. JEOR: SCORE: 1261.27

## 2021-01-07 NOTE — ED AVS SNAPSHOT
[facility]  [address]  [place] [address]  Phone: [phone]  Fax: [phone]                                    Funmilayo Stratton   MRN: [id number]    Department: [facility]   Date of Visit: 1/7/2021           After Visit Summary Signature Page    I have received my discharge instructions, and my questions have been answered. I have discussed any challenges I see with this plan with the nurse or doctor.    ..........................................................................................................................................  Patient/Patient Representative Signature      ..........................................................................................................................................  Patient Representative Print Name and Relationship to Patient    ..................................................               ................................................  Date                                   Time    ..........................................................................................................................................  Reviewed by Signature/Title    ...................................................              ..............................................  Date                                               Time          22EPIC Rev 08/18

## 2021-01-07 NOTE — ED NOTES
Maryse MORENO updated [facility] about discharge. Car cab transported pt back to [facility].

## 2021-01-07 NOTE — CONSULTS
[facility]    Hospitalist Consultation    Date of Admission:  1/7/2021    Assessment & Plan   Funmilayo Stratton is a 60 year old female who was admitted on 1/7/2021. I was asked by Dr. Graves in the ER to see the patient for cellulitis.    Cellulitis: Probable nonnecrotizing soft tissue infection of the right dorsal foot.  No sirs criteria, no significant pain, chronic dorsal foot wound continues to remain stable with healthy granulation and no drainage.  Scant nonraised erythema without fluctuance or bullae and reasonable to treat with empiric oral antibiotic with close outpatient follow-up already scheduled on 1/11 with PCP and 1/15 with podiatry.  Encouraged to continue with wound care per podiatry.  Offered observation in the hospital and patient declined given she wants to continue with therapy today at SNF.    DVT's: Bilateral and noted on outpatient ultrasound in [facility] today.  Known peripheral vascular disease, discussed anticoagulation options with patient and given her renal function and BMI would be a good candidate for DOAC.  She will need to consider chronic anticoagulation unless her mobility improves given these are likely provoked from her wheelchair-bound status.  Will benefit from elevation of bilateral legs when sitting or laying.  Will avoid compression with Ace dressings given known peripheral vascular disease.  She will continue Lasix daily.    Peripheral vascular disease: No significant dorsalis pedis pulses noted, is to have a CTA with runoff as scheduled with follow-up with vascular surgery on 1/13 and patient was encouraged to follow-up.  Also consider starting aspirin and statin as an outpatient.    Code Status: No Order    Rojas Lick    Reason for Consult   Reason for consult: I was asked by Dr. Graves to evaluate this patient for cellulitis.  Patient has been followed by podiatry for diabetic right midfoot wound and venous stasis ulcers.  She is to  follow-up with vascular surgery as an outpatient.  She is been continuing with physical therapy at [facility] with significant improvement.  Over last 24 hours she developed redness of her right foot and was brought to [facility].  There she underwent lower extremity ultrasounds notable for DVTs and was sent to the ER here.      Primary Care Physician   Abiel Castañeda    Chief Complaint   Redness of foot    History is obtained from the patient and chart review    History of Present Illness   Funmilayo Stratton is a 60 year old female who presents probable early cellulitis of her right foot.    Past Medical History    I have reviewed this patient's medical history and updated it with pertinent information if needed.   Past Medical History:   Diagnosis Date     DIABETES MELLITUS TYPE II-UNCOMPL 2006     HYPERTENSION NOS 2006     HYPOTHYROIDISM NOS 2006     Vitamin D deficiencies 2008       Past Surgical History   I have reviewed this patient's surgical history and updated it with pertinent information if needed.  History reviewed. No pertinent surgical history.    Prior to Admission Medications   Prior to Admission Medications   Prescriptions Last Dose Informant Patient Reported? Taking?   ACYCLOVIR 400 MG OR TABS   No No   Sig: one tablet by mouth 5 times daily for-7 days   BD U/F III SHORT PEN NEEDLE 31G X 8 MM MISC   No No   Sig: Use QID as directed   CLARITIN-D 24 HOUR#  MG OR TB24   No No   Si TABLET DAILY   Continuous Blood Gluc Sensor (DEXCOM G6 SENSOR) MISC   Yes No   Sig: PLACE SENSOR ON AND WEAR UP TO 10 DAYS THEN REPLACE   DULoxetine (CYMBALTA) 20 MG capsule 2021 at Unknown time  Yes Yes   Sig: TAKE 1 CAP BY MOUTH ONCE DAILY   GLUCAGON EMERGENCY 1 MG kit   Yes No   Sig: INJECT 1 MG 1 TIME as NEEDED FOR BLOOD GLUCOSE <60MG/DL   LANTUS SOLOSTAR 100 UNIT/ML SC SOLN   No No   Sig: Inject 50 Units once daily or as directed   LISINOPRIL 10 MG OR TABS   No No   Si  tab PO QD (Once per day)   Patient taking differently: 1 tab of 20 mg daily   Lactobacillus (LACTINEX) PACK   Yes No   Sig: Take 1 packet by mouth   MULTIVITAMIN/IRON OR TABS   Yes No   Sig: take one daily   NOVOLOG FLEXPEN 100 UNIT/ML SC SOLN   No No   Sig: inject by sliding scale (up to 25 units daily)   ONE TOUCH ULTRA BONUS PACK STRP   VI   No No   Sig: Test 3-4 times each day   ONETOUCH ULTRASOFT LANCETS MISC   No No   Sig: use 3-4 per day   QUEtiapine (SEROQUEL) 25 MG tablet   Yes No   Sig: Take 25 mg by mouth 2 times daily 25mg in the am, 50mg at HS   acetaminophen (TYLENOL) 500 MG tablet   Yes No   Sig: Take 1,000 mg by mouth   calcium carbonate (TUMS) 500 MG chewable tablet   Yes No   Sig: Take 1 chew tab by mouth 2 times daily   ferrous sulfate (FEROSUL) 325 (65 Fe) MG tablet   Yes No   Sig: Take 325 mg by mouth 2 times daily   fluticasone (VERAMYST) 27.5 MCG/SPRAY nasal spray   Yes No   Sig: Spray 1 spray into both nostrils daily   furosemide (LASIX) 20 MG tablet 1/7/2021 at Unknown time  Yes Yes   Sig: Take 40 mg by mouth daily   lamoTRIgine (LAMICTAL) 100 MG tablet 1/7/2021 at Unknown time  Yes Yes   Sig: TAKE 1 TAB BY MOUTH TWICE A DAY   lamoTRIgine (LAMICTAL) 150 MG tablet 1/7/2021 at Unknown time  Yes Yes   Sig: Take 150 mg by mouth   levothyroxine (SYNTHROID/LEVOTHROID) 125 MCG tablet 1/7/2021 at Unknown time  Yes Yes   Sig: Take 125 mcg by mouth every morning   mirtazapine (REMERON) 15 MG tablet   Yes No   Sig: Take 7.5 mg by mouth At Bedtime   mirtazapine (REMERON) 45 MG tablet   Yes No   Sig: TAKE 1 TAB BY MOUTH AT BEDTIME   naltrexone (DEPADE/REVIA) 50 MG tablet   Yes No   Sig: Take 50 mg by mouth   omeprazole (PRILOSEC) 40 MG DR capsule   Yes No   Sig: Take 40 mg by mouth daily   oxyCODONE (ROXICODONE) 5 MG tablet 1/7/2021 at Unknown time  No Yes   Sig: Take 1 tablet (5 mg) by mouth every 6 hours as needed for severe pain   potassium chloride ER (KLOR-CON M) 20 MEQ CR tablet   Yes No   Sig:  Take 20 mEq by mouth   vitamin D3 (CHOLECALCIFEROL) 2000 units tablet   Yes No   Sig: Take 1 tablet by mouth daily      Facility-Administered Medications: None     Allergies   Allergies   Allergen Reactions     Seasonal Allergies      Sulfa Drugs Rash     Vicodin [Hydrocodone-Acetaminophen] Rash       Social History   I have reviewed this patient's social history and updated it with pertinent information if needed. Funmilayo Stratton  reports that she has been smoking. She has a 23.00 pack-year smoking history. She has never used smokeless tobacco. She reports previous alcohol use. She reports that she does not use drugs.  She is now down to 1/2 pack a day of smoking, originally from [facility], daughter Ela is her PCA.    Family History   I have reviewed this patient's family history and updated it with pertinent information if needed.   Family History   Problem Relation Age of Onset     Hypertension Mother      Diabetes Mother      Asthma Father      Diabetes Father      Hypertension Father      Cerebrovascular Disease Father      Circulatory Father      Eye Disorder Father        Review of Systems     Constitutional: normal energy and appetite, no recent sick contacts, no Covid related symptoms.  Eyes: nochanges in vision  Ears, nose, mouth, throat, and face: no mouth sores, dysphagia, or odynophagia  Respiratory: no shortness of breath, cough, or wheezing.   Cardiovascular: no chest pain,palpitations, orthopnea, increased lower extremity edema, or syncope.   Gastrointestinal: no constipation, diarrhea, nausea, vomiting or abdominal pain.  Genitourinary: no dysuria, hematuria, urgency or frequency.   Heme/lymphatic: no unintentional weight loss or night sweats.  Musculoskeletal: Chronic leg pain which is unchanged today.  Neurological: no new weakness, tingling, numbness.   Endocrine: Blood sugars have been well controlled.      Physical Exam   Temp: 98.4  F (36.9  C) Temp src: Tympanic BP: (!) 147/79 Pulse: 67   Resp:  16 SpO2: 100 % O2 Device: None (Room air)    Vital Signs with Ranges  Temp:  [98.4  F (36.9  C)] 98.4  F (36.9  C)  Pulse:  [64-76] 67  Resp:  [16] 16  BP: (132-147)/(74-82) 147/79  SpO2:  [99 %-100 %] 100 %  143 lbs 0 oz    Exam:  GENERAL: Talkative, in no apparent distress.  Head: normocephalic and atraumatic  Eyes: anicteric and non-injected sclera  Nose: no rhinorrhea or epistaxis.   Throat: moist mucous membranes with no active oral lesions.  NECK: Supple, jugular venous distension not present.  CARDIOVASCULAR: regular rate and rhythm, no murmurs, rubs, or gallops. Normal S1/S2.   RESPIRATORY: clear to auscultation bilaterally, no wheezes, no crackles.  No accessory muscle use or evidence of respiratory distress.   GI: soft, non-tender, non-distended, normoactive bowel sounds.  MUSCULOSKELETAL: warm and well perfused, 2+ dorsalis pedis pulses.  Bilateral 1+ pitting lower extremity edema to the knees.    SKIN: Right dorsal midfoot with well-circumscribed healing ulceration with healthy granulation and no fluctuance or drainage.  Proximally to this some nonraised erythema and a poorly circumscribed patch this area was outlined and dated.  NEUROLOGY: AAOx3, follows commands, speech and language without focal deficits.        Data   -Data reviewed today: All pertinent laboratory and imaging results from this encounter were reviewed.     Recent Labs   Lab 01/07/21  1010   WBC 4.7   HGB 11.8         INR 0.82*      POTASSIUM 4.1   CHLORIDE 103   CO2 28   BUN 18   CR 0.76   ANIONGAP 7   BEST 8.6   *   ALBUMIN 2.8*   PROTTOTAL 5.5*   BILITOTAL 0.3   ALKPHOS 150*   ALT 79*   AST 71*       No results found for this or any previous visit (from the past 24 hour(s)).

## 2021-01-07 NOTE — ED TRIAGE NOTES
Patient went to clinic today for a ultrasound referral, [facility] confirmed bilat DVTs. Patient states she always has pain and poor circulation in lower extremities. Has been wheelchair bound at [facility] r/t [facility]. Denies SOB and chest pain. Lower extremities are +2 edema, wound on the right foot that is causing pain.

## 2021-01-07 NOTE — ED PROVIDER NOTES
[facility]  Emergency Department Visit Note    No chief complaint on file.      History of Present Illness     HPI:  Funmilayo Stratton is a 60 year old old female presenting with bilatera leg pain and decreased pulses and sent to clinic for an US and noted to have bilateral DVTs. This was noted just prior to arrival and she was transferred by EMS from clinic. She does have a history of lower extremity edema and does not have a history of deep vein thrombosis. She has an ulcer on her left foot that is more swollen and red. No fevers, chills, chest pain, abdominal pain, nausea, vomiting, shortness of breath. No recent travel or prolonged immobilization. No known hypercoagulable history.    Medications:  Prior to Admission medications    Medication Sig Last Dose Taking? Auth Provider   acetaminophen (TYLENOL) 500 MG tablet Take 1,000 mg by mouth   Reported, Patient   ACYCLOVIR 400 MG OR TABS one tablet by mouth 5 times daily for-7 days   Tania Stratton APRN CNP   ASPIRIN 81 MG OR TABS ONE DAILY   Tania Stratton APRN CNP   BD U/F III SHORT PEN NEEDLE 31G X 8 MM MISC Use QID as directed   Tania Stratton APRN CNP   calcium carbonate (TUMS) 500 MG chewable tablet Take 1 chew tab by mouth 2 times daily   Reported, Patient   CLARITIN-D 24 HOUR#  MG OR TB24 1 TABLET DAILY   Tania Stratton APRN CNP   Continuous Blood Gluc Sensor (DEXCOM G6 SENSOR) MIS PLACE SENSOR ON AND WEAR UP TO 10 DAYS THEN REPLACE   Reported, Patient   DULoxetine (CYMBALTA) 20 MG capsule TAKE 1 CAP BY MOUTH ONCE DAILY   Reported, Patient   ferrous sulfate (FEROSUL) 325 (65 Fe) MG tablet Take 325 mg by mouth 2 times daily   Reported, Patient   fluticasone (VERAMYST) 27.5 MCG/SPRAY nasal spray [place] 1 spray into both nostrils daily   Reported, Patient   furosemide (LASIX) 20 MG tablet Take 40 mg by mouth daily   Reported, Patient   GLUCAGON EMERGENCY 1 MG kit INJECT 1 MG 1 TIME as NEEDED FOR  BLOOD GLUCOSE <60MG/DL   Reported, Patient   Lactobacillus (LACTINEX) PACK Take 1 packet by mouth   Reported, Patient   lamoTRIgine (LAMICTAL) 100 MG tablet TAKE 1 TAB BY MOUTH TWICE A DAY   Reported, Patient   lamoTRIgine (LAMICTAL) 150 MG tablet Take 150 mg by mouth   Reported, Patient   LANTUS SOLOSTAR 100 UNIT/ML SC SOLN Inject 50 Units once daily or as directed   Tania Stratton APRN CNP   levothyroxine (SYNTHROID/LEVOTHROID) 125 MCG tablet Take 125 mcg by mouth every morning   Reported, Patient   LISINOPRIL 10 MG OR TABS 1 tab PO QD (Once per day)  Patient taking differently: 1 tab of 20 mg daily   Tania Stratton APRN CNP   mirtazapine (REMERON) 15 MG tablet Take 7.5 mg by mouth At Bedtime   Reported, Patient   mirtazapine (REMERON) 45 MG tablet TAKE 1 TAB BY MOUTH AT BEDTIME   Reported, Patient   MULTIVITAMIN/IRON OR TABS take one daily   Tania Stratton APRN CNP   naltrexone (DEPADE/REVIA) 50 MG tablet Take 50 mg by mouth   Reported, Patient   NOVOLOG FLEXPEN 100 UNIT/ML SC SOLN inject by sliding scale (up to 25 units daily)   Tania Stratton APRN CNP   omeprazole (PRILOSEC) 40 MG DR capsule Take 40 mg by mouth daily   Reported, Patient   ONE TOUCH ULTRA BONUS PACK STRP   VI Test 3-4 times each day   Tania Stratton APRN CNP   ONETOUCH ULTRASOFT LANCETS MISC use 3-4 per day   Tania Stratton APRN CNP   oxyCODONE (ROXICODONE) 5 MG tablet Take 1 tablet (5 mg) by mouth every 6 hours as needed for severe pain   Abiel Castañeda MD   potassium chloride ER (KLOR-CON M) 20 MEQ CR tablet Take 20 mEq by mouth   Reported, Patient   QUEtiapine (SEROQUEL) 25 MG tablet Take 25 mg by mouth 2 times daily 25mg in the am, 50mg at HS   Reported, Patient   vitamin D3 (CHOLECALCIFEROL) 2000 units tablet Take 1 tablet by mouth daily   Reported, Patient       Allergies:  Allergies   Allergen Reactions     Seasonal Allergies      Sulfa Drugs Rash     Vicodin  [Hydrocodone-Acetaminophen] Rash       Problem List:  Patient Active Problem List   Diagnosis     Essential hypertension     Hypothyroidism     Episodic mood disorder (H)     Pure hypercholesterolemia     Tobacco use disorder     Esophageal reflux     Allergic rhinitis     Vitamin D deficiency     Controlled type 2 diabetes mellitus with complication, with long-term current use of insulin (H)     HYPERLIPIDEMIA LDL GOAL <100     S/P gastric bypass     Malnutrition, unspecified type (H)     Venous stasis of both lower extremities     Diabetic ulcer of left heel associated with type 2 diabetes mellitus, unspecified ulcer stage (H)       Past Medical History:  Past Medical History:   Diagnosis Date     DIABETES MELLITUS TYPE II-UNCOMPL 2006     HYPERTENSION NOS 2006     HYPOTHYROIDISM NOS 2006     Vitamin D deficiencies 2008       Past Surgical History:   No past surgical history on file.    Social History:  Social History     Tobacco Use     Smoking status: Current Every Day Smoker     Packs/day: 0.50     Years: 46.00     Pack years: 23.00     Last attempt to quit: 2008     Years since quittin.3     Smokeless tobacco: Never Used     Tobacco comment: pt said shes working on it on her own   Substance Use Topics     Alcohol use: Not Currently     Comment: 1 liter of whiskey per day, last drank at 1100 3/5/2019     Drug use: No       Review of Systems:  10 point review of systems obtained and pertinent positive and negative findings noted in HPI. Review of systems otherwise negative.      Physical Exam     Vital signs: LMP 2008 (LMP Unknown)     Physical Exam:    General: awake and alert, comfortable  HEENT: atraumatic, no scleral injection, no nasal discharge, neck supple  Chest: clear to auscultation bilaterally without wheezes or crackles, non labored respirations, symmetric chest rise  Cardiovascular: regular rate and rhythm, no murmurs or gallops  Abdomen: soft, nontender, no  rebound or guarding, nondistended  Extremities:  2+ tender edema to bilateral lower extremity below the knee with erythema and without asymmetry compared to the contralateral lower extremity, no deformities, dorsalis pedis 2+ bilaterally and capillary refill is < 3 sec  Skin: ulcer to  Dorsum of left foot with clean base but surrounding erythema  Neuro: alert and oriented x 3, moving extremities x 4, ambulates without difficulty    Medical Decision Making & ED Course     Funmilayo Stratton is a 60 year old old female presenting with bilateral DVTs and now cellulitis to chronic ulcer leg swelling.  Given the exam and positive  venous ultrasound. d laboratory studies were indicated to establish baseline INR. History, exam and ultrasound findings are diagnostic of deep vein thrombosis. Given the low suspicion for PE and her current status at [facility], this can be managed as an outpatient with initiation of Xarelto 10mg bid and Augmentin with close follow up at 5-7 days with primary care provider and anti-coagulation clinic. She is stable for further outpatient management. Patient given instructions on follow-up and warning signs for which to return to ED. All questions were answered and the patient is comfortable with plan for discharge. The patient was discharged in stable condition.    Diagnosis & Disposition     Diagnosis:  1. Acute deep vein thrombosis (DVT) of proximal vein of both lower extremities (H)    2. Diabetic ulcer of right midfoot associated with type 1 diabetes mellitus, with fat layer exposed (H)    3. Cellulitis of right lower extremity        Disposition:  MD Halley Grace Theresa M, MD  01/07/21 [id number]

## 2021-01-07 NOTE — TELEPHONE ENCOUNTER
Called and spoke with Marisabel at [facility], after verifying Pt's last name and . She was notified of Dr. Castañeda's note below. Kamilla Wolf RN .............. 2021  9:21 AM

## 2021-01-08 ENCOUNTER — TELEPHONE (OUTPATIENT)
Dept: PEDIATRICS | Facility: OTHER | Age: 61
End: 2021-01-08

## 2021-01-08 DIAGNOSIS — L02.419 CELLULITIS AND ABSCESS OF LEG: Primary | ICD-10-CM

## 2021-01-08 DIAGNOSIS — L03.119 CELLULITIS AND ABSCESS OF LEG: Primary | ICD-10-CM

## 2021-01-08 NOTE — TELEPHONE ENCOUNTER
Pharmacy should be sending this to our PA department.  Emely Church LPN.........................1/8/2021  9:01 AM

## 2021-01-08 NOTE — TELEPHONE ENCOUNTER
Note from pharmacy:  Prescription not previously filled at [facility]. Please authorize a new RX for this patient. Thank you. Never was escribed this 1/7

## 2021-01-08 NOTE — TELEPHONE ENCOUNTER
[id number] sent Rx request for the following:   amoxicillin-clavulanate (AUGMENTIN) 875-125 MG tablet  Sig: Take 1 tablet by mouth 2 times daily    Last Prescription Date:   01/07/2021  Last Fill Qty/Refills:         20, R-0    Last Office Visit:              12/28/2020 ([facility])   Future Office visit:           01/11/2021 ([facility])    Routing refill request to provider for review/approval because:  Drug not on the [facility], [facility] or [facility] refill protocol or controlled substance    Noted patient prescribed Rx per Sonia Rowley MD via ED visit 01/07/2021. Rx sent to [facility]. Patient request to have Rx sent to [facility], is [facility] resident. Will route to provider for review. Unable to complete prescription refill per RN Medication Refill Policy.................... Hortensia Rajput RN ....................  1/8/2021   11:57 AM

## 2021-01-11 ENCOUNTER — HOSPITAL ENCOUNTER (OUTPATIENT)
Dept: CT IMAGING | Facility: OTHER | Age: 61
End: 2021-01-11
Attending: SURGERY
Payer: MEDICAID

## 2021-01-11 ENCOUNTER — OFFICE VISIT (OUTPATIENT)
Dept: PEDIATRICS | Facility: OTHER | Age: 61
End: 2021-01-11
Attending: INTERNAL MEDICINE
Payer: MEDICAID

## 2021-01-11 VITALS
SYSTOLIC BLOOD PRESSURE: 110 MMHG | BODY MASS INDEX: 21.93 KG/M2 | HEART RATE: 88 BPM | RESPIRATION RATE: 16 BRPM | TEMPERATURE: 98.9 F | OXYGEN SATURATION: 97 % | DIASTOLIC BLOOD PRESSURE: 64 MMHG | WEIGHT: 140 LBS

## 2021-01-11 DIAGNOSIS — R74.01 NONSPECIFIC ELEVATION OF LEVELS OF TRANSAMINASE OR LACTIC ACID DEHYDROGENASE (LDH): ICD-10-CM

## 2021-01-11 DIAGNOSIS — G89.29 OTHER CHRONIC PAIN: ICD-10-CM

## 2021-01-11 DIAGNOSIS — Z76.89 ENCOUNTER TO ESTABLISH CARE: Primary | ICD-10-CM

## 2021-01-11 DIAGNOSIS — E03.9 HYPOTHYROIDISM, UNSPECIFIED TYPE: ICD-10-CM

## 2021-01-11 DIAGNOSIS — G89.4 CHRONIC PAIN SYNDROME: ICD-10-CM

## 2021-01-11 DIAGNOSIS — I10 ESSENTIAL HYPERTENSION: ICD-10-CM

## 2021-01-11 DIAGNOSIS — R74.02 NONSPECIFIC ELEVATION OF LEVELS OF TRANSAMINASE OR LACTIC ACID DEHYDROGENASE (LDH): ICD-10-CM

## 2021-01-11 DIAGNOSIS — Z12.31 ENCOUNTER FOR SCREENING MAMMOGRAM FOR BREAST CANCER: ICD-10-CM

## 2021-01-11 DIAGNOSIS — F33.8 SEASONAL DEPRESSION (H): ICD-10-CM

## 2021-01-11 DIAGNOSIS — F43.10 PTSD (POST-TRAUMATIC STRESS DISORDER): ICD-10-CM

## 2021-01-11 DIAGNOSIS — E11.8 CONTROLLED TYPE 2 DIABETES MELLITUS WITH COMPLICATION, WITH LONG-TERM CURRENT USE OF INSULIN (H): ICD-10-CM

## 2021-01-11 DIAGNOSIS — E11.621 DIABETIC ULCER OF LEFT HEEL ASSOCIATED WITH TYPE 2 DIABETES MELLITUS, UNSPECIFIED ULCER STAGE (H): ICD-10-CM

## 2021-01-11 DIAGNOSIS — L02.419 CELLULITIS AND ABSCESS OF LEG: ICD-10-CM

## 2021-01-11 DIAGNOSIS — Z12.11 SCREEN FOR COLON CANCER: ICD-10-CM

## 2021-01-11 DIAGNOSIS — J32.9 CHRONIC SINUSITIS, UNSPECIFIED LOCATION: ICD-10-CM

## 2021-01-11 DIAGNOSIS — Z79.4 CONTROLLED TYPE 2 DIABETES MELLITUS WITH COMPLICATION, WITH LONG-TERM CURRENT USE OF INSULIN (H): ICD-10-CM

## 2021-01-11 DIAGNOSIS — Z86.19 HISTORY OF HELICOBACTER PYLORI INFECTION: ICD-10-CM

## 2021-01-11 DIAGNOSIS — L03.119 CELLULITIS AND ABSCESS OF LEG: ICD-10-CM

## 2021-01-11 DIAGNOSIS — K21.00 GASTROESOPHAGEAL REFLUX DISEASE WITH ESOPHAGITIS, UNSPECIFIED WHETHER HEMORRHAGE: ICD-10-CM

## 2021-01-11 DIAGNOSIS — L97.429 DIABETIC ULCER OF LEFT HEEL ASSOCIATED WITH TYPE 2 DIABETES MELLITUS, UNSPECIFIED ULCER STAGE (H): ICD-10-CM

## 2021-01-11 LAB
ALBUMIN SERPL-MCNC: 2.4 G/DL (ref 3.5–5.7)
ALP SERPL-CCNC: 138 U/L (ref 34–104)
ALT SERPL W P-5'-P-CCNC: 95 U/L (ref 7–52)
ANION GAP SERPL CALCULATED.3IONS-SCNC: 4 MMOL/L (ref 3–14)
AST SERPL W P-5'-P-CCNC: 141 U/L (ref 13–39)
BILIRUB SERPL-MCNC: 0.3 MG/DL (ref 0.3–1)
BUN SERPL-MCNC: 19 MG/DL (ref 7–25)
CALCIUM SERPL-MCNC: 8.2 MG/DL (ref 8.6–10.3)
CHLORIDE SERPL-SCNC: 102 MMOL/L (ref 98–107)
CO2 SERPL-SCNC: 31 MMOL/L (ref 21–31)
CREAT SERPL-MCNC: 0.82 MG/DL (ref 0.6–1.2)
GFR SERPL CREATININE-BSD FRML MDRD: 72 ML/MIN/{1.73_M2}
GLUCOSE SERPL-MCNC: 304 MG/DL (ref 70–105)
POTASSIUM SERPL-SCNC: 4.8 MMOL/L (ref 3.5–5.1)
PROT SERPL-MCNC: 4.8 G/DL (ref 6.4–8.9)
SODIUM SERPL-SCNC: 137 MMOL/L (ref 134–144)
T4 FREE SERPL-MCNC: 0.96 NG/DL (ref 0.6–1.6)
TSH SERPL DL<=0.05 MIU/L-ACNC: 4.56 IU/ML (ref 0.34–5.6)

## 2021-01-11 PROCEDURE — 87340 HEPATITIS B SURFACE AG IA: CPT | Mod: ZL | Performed by: INTERNAL MEDICINE

## 2021-01-11 PROCEDURE — 36415 COLL VENOUS BLD VENIPUNCTURE: CPT | Mod: ZL | Performed by: INTERNAL MEDICINE

## 2021-01-11 PROCEDURE — 86709 HEPATITIS A IGM ANTIBODY: CPT | Mod: ZL | Performed by: INTERNAL MEDICINE

## 2021-01-11 PROCEDURE — 255N000002 HC RX 255 OP 636: Performed by: SURGERY

## 2021-01-11 PROCEDURE — 86803 HEPATITIS C AB TEST: CPT | Mod: ZL | Performed by: INTERNAL MEDICINE

## 2021-01-11 PROCEDURE — 86706 HEP B SURFACE ANTIBODY: CPT | Mod: ZL | Performed by: INTERNAL MEDICINE

## 2021-01-11 PROCEDURE — 75635 CT ANGIO ABDOMINAL ARTERIES: CPT

## 2021-01-11 PROCEDURE — 80053 COMPREHEN METABOLIC PANEL: CPT | Mod: ZL | Performed by: INTERNAL MEDICINE

## 2021-01-11 PROCEDURE — 84443 ASSAY THYROID STIM HORMONE: CPT | Mod: ZL | Performed by: INTERNAL MEDICINE

## 2021-01-11 PROCEDURE — G0463 HOSPITAL OUTPT CLINIC VISIT: HCPCS | Mod: 25

## 2021-01-11 PROCEDURE — 86704 HEP B CORE ANTIBODY TOTAL: CPT | Mod: ZL | Performed by: INTERNAL MEDICINE

## 2021-01-11 PROCEDURE — 84439 ASSAY OF FREE THYROXINE: CPT | Mod: ZL | Performed by: INTERNAL MEDICINE

## 2021-01-11 PROCEDURE — 99214 OFFICE O/P EST MOD 30 MIN: CPT | Performed by: INTERNAL MEDICINE

## 2021-01-11 PROCEDURE — 86708 HEPATITIS A ANTIBODY: CPT | Mod: ZL | Performed by: INTERNAL MEDICINE

## 2021-01-11 PROCEDURE — G0463 HOSPITAL OUTPT CLINIC VISIT: HCPCS

## 2021-01-11 RX ORDER — LISINOPRIL 20 MG/1
10 TABLET ORAL DAILY
COMMUNITY
End: 2021-01-11

## 2021-01-11 RX ORDER — FUROSEMIDE 20 MG
40 TABLET ORAL DAILY
Qty: 90 TABLET | Refills: 3 | Status: SHIPPED | OUTPATIENT
Start: 2021-01-11

## 2021-01-11 RX ORDER — GABAPENTIN 100 MG/1
100 CAPSULE ORAL 3 TIMES DAILY
Qty: 90 CAPSULE | Refills: 3 | Status: SHIPPED | OUTPATIENT
Start: 2021-01-11

## 2021-01-11 RX ORDER — VALACYCLOVIR HYDROCHLORIDE 500 MG/1
500 TABLET, FILM COATED ORAL DAILY
COMMUNITY

## 2021-01-11 RX ORDER — ONDANSETRON 4 MG/1
4 TABLET, FILM COATED ORAL EVERY 6 HOURS PRN
COMMUNITY
End: 2021-01-11

## 2021-01-11 RX ORDER — OMEPRAZOLE 40 MG/1
40 CAPSULE, DELAYED RELEASE ORAL 2 TIMES DAILY
Qty: 90 CAPSULE | Refills: 3 | Status: SHIPPED | OUTPATIENT
Start: 2021-01-11

## 2021-01-11 RX ORDER — OXYCODONE HYDROCHLORIDE 5 MG/1
5 TABLET ORAL AT BEDTIME
Qty: 30 TABLET | Refills: 0 | Status: SHIPPED | OUTPATIENT
Start: 2021-01-11 | End: 2021-02-01

## 2021-01-11 RX ORDER — ASPIRIN 81 MG/1
81 TABLET ORAL DAILY
COMMUNITY

## 2021-01-11 RX ORDER — FLUTICASONE PROPIONATE 50 MCG
1 SPRAY, SUSPENSION (ML) NASAL DAILY
Qty: 48 G | Refills: 3 | Status: SHIPPED | OUTPATIENT
Start: 2021-01-11

## 2021-01-11 RX ORDER — CYCLOBENZAPRINE HCL 10 MG
5 TABLET ORAL 2 TIMES DAILY PRN
COMMUNITY
End: 2021-01-11

## 2021-01-11 RX ORDER — DULOXETIN HYDROCHLORIDE 20 MG/1
20 CAPSULE, DELAYED RELEASE ORAL DAILY
Qty: 90 CAPSULE | Refills: 3 | Status: SHIPPED | OUTPATIENT
Start: 2021-01-11

## 2021-01-11 RX ORDER — LORATADINE 10 MG/1
10 TABLET ORAL DAILY
COMMUNITY
End: 2021-01-11

## 2021-01-11 RX ORDER — FLUTICASONE PROPIONATE 50 MCG
1 SPRAY, SUSPENSION (ML) NASAL DAILY
COMMUNITY
End: 2021-01-11

## 2021-01-11 RX ORDER — CHLORAL HYDRATE 500 MG
1 CAPSULE ORAL DAILY
COMMUNITY

## 2021-01-11 RX ORDER — LEVOTHYROXINE SODIUM 125 UG/1
125 TABLET ORAL EVERY MORNING
Qty: 90 TABLET | Refills: 3 | Status: SHIPPED | OUTPATIENT
Start: 2021-01-11

## 2021-01-11 RX ORDER — LISINOPRIL 20 MG/1
10 TABLET ORAL DAILY
Qty: 90 TABLET | Refills: 3 | Status: SHIPPED | OUTPATIENT
Start: 2021-01-11

## 2021-01-11 RX ORDER — QUETIAPINE FUMARATE 50 MG/1
100 TABLET, FILM COATED ORAL AT BEDTIME
COMMUNITY

## 2021-01-11 RX ORDER — ALBUTEROL SULFATE 90 UG/1
2 AEROSOL, METERED RESPIRATORY (INHALATION) EVERY 4 HOURS PRN
COMMUNITY

## 2021-01-11 RX ADMIN — IOHEXOL 100 ML: 350 INJECTION, SOLUTION INTRAVENOUS at 08:50

## 2021-01-11 ASSESSMENT — PAIN SCALES - GENERAL: PAINLEVEL: SEVERE PAIN (7)

## 2021-01-11 NOTE — PATIENT INSTRUCTIONS
-- Lantus to 8 units AM   -- Start Novolog carb counting TID AC + correction scale   -- Stop Flexeril   -- Oxycodone 5 mg HS x 30 days then stop   -- Stop Zofran   -- Start gabapentin   -- Stop naltrexone   -- Great job, continue avoiding alcohol.    -- Stop claritin   -- Diab Ed consult with Tressa   -- Continue foot care with [facility] in [place]   -- Consult with Dr. Wells for Remeron, Lamictal, Seroquel and see if we can reduce or stop any of these   -- Vascular surgery consult in [facility]

## 2021-01-11 NOTE — PROGRESS NOTES
Subjective   Funmilayo Stratton is a 60 year old female who presents for establish primary care.  She wants to consolidate her medical care if possible.  She has been working to wean down her oxycodone.  She was taking 2 every 6 hours now taking about 2 a day.  She takes it for foot pain.    Objective   Vitals: /64 (BP Location: Right arm, Patient Position: Sitting, Cuff Size: Adult Regular)   Pulse 88   Temp 98.9  F (37.2  C) (Tympanic)   Resp 16   Wt 63.5 kg (140 lb)   LMP 06/26/2008 (LMP Unknown)   SpO2 97%   Breastfeeding No   BMI 21.93 kg/m        Review and Analysis of Data   I personally reviewed the following:  External notes: Yes I reviewed the note from the podiatrist as well as vascular surgery clinic.    Results: Yes most recent available laboratory data in EMR  Use of an independent historian: No  Independent review of a test performed by another physician: No  Discussion of management with another physician: No  Moderate risk of morbidity from additional diagnostic testing and/or treatment.    Assessment & Plan   1. Encounter to establish care  I reviewed all of her medications today.  I have made changes as outlined below.  Close follow-up is recommended.    2. Encounter for screening mammogram for breast cancer  Defer    3. Hypothyroidism, unspecified type  At goal, no change.  - Thyrotropin GH; Future  - T4, Free; Future  - levothyroxine (SYNTHROID/LEVOTHROID) 125 MCG tablet; Take 1 tablet (125 mcg) by mouth every morning  Dispense: 90 tablet; Refill: 3  - T4, Free  - Thyrotropin GH    4. Screen for colon cancer  Defer    5. Other chronic pain  We will try to wean down and off of oxycodone in the next 30 days.  - oxyCODONE (ROXICODONE) 5 MG tablet; Take 1 tablet (5 mg) by mouth At Bedtime  Dispense: 30 tablet; Refill: 0    6. Controlled type 2 diabetes mellitus with complication, with long-term current use of insulin (H)  Small changes recommended, glad to see she will be seeing Tressa  from diabetes education.  - insulin glargine (LANTUS PEN) 100 UNIT/ML pen; Inject 8 Units Subcutaneous every morning  Dispense: 15 mL; Refill: 3  - insulin aspart (NOVOLOG PEN) 100 UNIT/ML pen; 1 unit per 15 gram carb TID AC + correction scale  Dispense: 15 mL; Refill: 3    7. Chronic pain syndrome  We are adding gabapentin to assist with chronic pain as we are weaning off of narcotics.  - DULoxetine (CYMBALTA) 20 MG capsule; Take 1 capsule (20 mg) by mouth daily  Dispense: 90 capsule; Refill: 3  - gabapentin (NEURONTIN) 100 MG capsule; Take 1 capsule (100 mg) by mouth 3 times daily  Dispense: 90 capsule; Refill: 3    8. PTSD (post-traumatic stress disorder)  I like her to see Dr. Wells to keep all of her services under one roof if possible.  - MENTAL HEALTH REFERRAL  - Adult; Psychiatry; Psychiatry; Other: [facility] [phone]; We will contact you to schedule the appointment or please call with any questions    9. Seasonal depression (H)  - MENTAL HEALTH REFERRAL  - Adult; Psychiatry; Psychiatry; Other: [facility] [phone]; We will contact you to schedule the appointment or please call with any questions    10. Essential hypertension  At goal, no change.  - lisinopril (ZESTRIL) 20 MG tablet; Take 0.5 tablets (10 mg) by mouth daily  Dispense: 90 tablet; Refill: 3  - furosemide (LASIX) 20 MG tablet; Take 2 tablets (40 mg) by mouth daily  Dispense: 90 tablet; Refill: 3    11. Gastroesophageal reflux disease with esophagitis, unspecified whether hemorrhage  At goal, no change.  - omeprazole (PRILOSEC) 40 MG DR capsule; Take 1 capsule (40 mg) by mouth 2 times daily  Dispense: 90 capsule; Refill: 3    12. History of Helicobacter pylori infection  At goal, no change.  - omeprazole (PRILOSEC) 40 MG DR capsule; Take 1 capsule (40 mg) by mouth 2 times daily  Dispense: 90 capsule; Refill: 3    13. Chronic sinusitis, unspecified location  At goal, no change.  - fluticasone (FLONASE) 50 MCG/ACT  nasal spray; Spray 1 spray into both nostrils daily  Dispense: 48 g; Refill: 3    14. Nonspecific elevation of levels of transaminase or lactic acid dehydrogenase (LDH)  - Comprehensive metabolic panel; Future  - Hepatitis A Antibody IgG; Future  - Hepatitis A antibody IgM; Future  - Hepatitis B core antibody; Future  - Hepatitis B Surface Antibody; Future  - Hepatitis B surface antigen; Future  - Hepatitis C Screen Reflex to HCV RNA Quant and Genotype; Future  - Hepatitis C Screen Reflex to HCV RNA Quant and Genotype  - Hepatitis B surface antigen  - Hepatitis B Surface Antibody  - Hepatitis B core antibody  - Hepatitis A antibody IgM  - Hepatitis A Antibody IgG  - Comprehensive metabolic panel        Patient Instructions    -- Lantus to 8 units AM   -- Start Novolog carb counting TID AC + correction scale   -- Stop Flexeril   -- Oxycodone 5 mg HS x 30 days then stop   -- Stop Zofran   -- Start gabapentin   -- Stop naltrexone   -- Great job, continue avoiding alcohol.    -- Stop claritin   -- Diab Ed consult with Tressa   -- Continue foot care with [facility] in [place]   -- Consult with Dr. Wells for Remeron, Lamictal, Seroquel and see if we can reduce or stop any of these   -- Vascular surgery consult in [facility]      Return in about 1 month (around 2/11/2021) for med management.    SignedAbiel MD, FAAP, FACP  Internal Medicine & Pediatrics

## 2021-01-11 NOTE — NURSING NOTE
"Chief Complaint   Patient presents with     Recheck Medication     Patient presents for a medication recheck     Initial /64 (BP Location: Right arm, Patient Position: Sitting, Cuff Size: Adult Regular)   Pulse 88   Temp 98.9  F (37.2  C) (Tympanic)   Resp 16   Wt 63.5 kg (140 lb)   LMP 06/26/2008 (LMP Unknown)   SpO2 97%   Breastfeeding No   BMI 21.93 kg/m   Estimated body mass index is 21.93 kg/m  as calculated from the following:    Height as of 1/7/21: 1.702 m (5' 7\").    Weight as of this encounter: 63.5 kg (140 lb).  Medication Reconciliation: complete    Keyonna Ambrosio MA  "

## 2021-01-12 ENCOUNTER — TELEPHONE (OUTPATIENT)
Dept: PEDIATRICS | Facility: OTHER | Age: 61
End: 2021-01-12

## 2021-01-12 LAB
HAV IGG SER QL IA: REACTIVE
HAV IGM SERPL QL IA: NONREACTIVE
HBV CORE AB SERPL QL IA: NONREACTIVE
HBV SURFACE AB SERPL IA-ACNC: 0 M[IU]/ML
HBV SURFACE AG SERPL QL IA: NONREACTIVE
HCV AB SERPL QL IA: NONREACTIVE

## 2021-01-12 NOTE — TELEPHONE ENCOUNTER
"Refill request received from [facility] for amoxicillin-clavulanate (AUGMENTIN) 875-125 MG tablet.  This medication was prescribed on 1/08/2021 x 20 tabs so if taking according to directions she should still have medication left.  Patient established care with Dr. Castañeda yesterday so sending to have him decide on this request.      ALSO, today we received the following message from pharmacy regarding refill sent yesterday, please clarify in this encounter:    \"Fax received from [facility] in regards to Novolog prescription they received yesterday. They need a max per day for billing purposes.      Keyonna Ambrosio CMA on 1/12/2021 at 8:42 AM\"    Unable to complete prescription refill per RN Medication Refill Policy. Criss Ng RN 1/12/2021 9:12 AM       "

## 2021-01-12 NOTE — TELEPHONE ENCOUNTER
Fax received from [facility] in regards to Novolog prescription they received yesterday. They need a max per day for billing purposes.     Keyonna Ambrosio CMA on 1/12/2021 at 8:42 AM

## 2021-01-12 NOTE — TELEPHONE ENCOUNTER
Complete course of antibiotics as prescribed and follow-up with Podiatry as planned.      ICD-10-CM    1. Cellulitis and abscess of leg  L03.119 amoxicillin-clavulanate (AUGMENTIN) 875-125 MG tablet [Pharmacy Med Name: AUGMENTIN 875-125 TABLET]    L02.419    2. Controlled type 2 diabetes mellitus with complication, with long-term current use of insulin (H)  E11.8 insulin aspart (NOVOLOG PEN) 100 UNIT/ML pen    Z79.4       Orders Placed This Encounter   Medications     insulin aspart (NOVOLOG PEN) 100 UNIT/ML pen     Si unit per 15 gram carb TID AC + correction scale     Dispense:  15 mL     Refill:  3     Max 200 units/day     Signed, Abiel Castañeda MD, FAAP, FACP  Internal Medicine & Pediatrics

## 2021-01-12 NOTE — TELEPHONE ENCOUNTER
Sent this request to Dr. Castañeda in another open RX encounter.  Closing this.  Criss Ng RN on 1/12/2021 at 9:14 AM

## 2021-01-12 NOTE — TELEPHONE ENCOUNTER
Writer called [facility] to see why a refill request was requested on Augmentin as it was filled on 1/8/2021. They stated they do not show they sent a refill request to us. They also stated to disregard refill request. Writer informed them it was denied anyway.     Keyonna Ambrosio CMA on 1/12/2021 at 5:09 PM

## 2021-01-13 ENCOUNTER — TELEPHONE (OUTPATIENT)
Dept: PEDIATRICS | Facility: OTHER | Age: 61
End: 2021-01-13

## 2021-01-13 DIAGNOSIS — I82.4Y9 ACUTE DEEP VEIN THROMBOSIS (DVT) OF PROXIMAL VEIN OF LOWER EXTREMITY, UNSPECIFIED LATERALITY (H): Primary | ICD-10-CM

## 2021-01-14 ENCOUNTER — OFFICE VISIT (OUTPATIENT)
Dept: FAMILY MEDICINE | Facility: OTHER | Age: 61
End: 2021-01-14
Attending: INTERNAL MEDICINE
Payer: MEDICAID

## 2021-01-14 VITALS
SYSTOLIC BLOOD PRESSURE: 120 MMHG | DIASTOLIC BLOOD PRESSURE: 70 MMHG | TEMPERATURE: 97.3 F | BODY MASS INDEX: 21.77 KG/M2 | WEIGHT: 139 LBS | HEART RATE: 84 BPM | RESPIRATION RATE: 18 BRPM | OXYGEN SATURATION: 100 %

## 2021-01-14 DIAGNOSIS — F99 INSOMNIA DUE TO OTHER MENTAL DISORDER: ICD-10-CM

## 2021-01-14 DIAGNOSIS — F31.9 BIPOLAR I DISORDER (H): Primary | ICD-10-CM

## 2021-01-14 DIAGNOSIS — F32.89 OTHER DEPRESSION: ICD-10-CM

## 2021-01-14 DIAGNOSIS — F51.05 INSOMNIA DUE TO OTHER MENTAL DISORDER: ICD-10-CM

## 2021-01-14 PROCEDURE — 99205 OFFICE O/P NEW HI 60 MIN: CPT | Performed by: PSYCHIATRY & NEUROLOGY

## 2021-01-14 PROCEDURE — G0463 HOSPITAL OUTPT CLINIC VISIT: HCPCS

## 2021-01-14 RX ORDER — TOPIRAMATE 25 MG/1
25 TABLET, FILM COATED ORAL AT BEDTIME
Qty: 30 TABLET | Refills: 1 | Status: SHIPPED | OUTPATIENT
Start: 2021-01-14

## 2021-01-14 RX ORDER — LAMOTRIGINE 200 MG/1
200 TABLET ORAL AT BEDTIME
Qty: 30 TABLET | Refills: 4 | Status: SHIPPED | OUTPATIENT
Start: 2021-01-14

## 2021-01-14 RX ORDER — BUPROPION HYDROCHLORIDE 150 MG/1
150 TABLET ORAL EVERY MORNING
Qty: 7 TABLET | Refills: 1 | Status: SHIPPED | OUTPATIENT
Start: 2021-01-14

## 2021-01-14 RX ORDER — QUETIAPINE FUMARATE 100 MG/1
150 TABLET, FILM COATED ORAL AT BEDTIME
Qty: 45 TABLET | Refills: 1 | Status: SHIPPED | OUTPATIENT
Start: 2021-01-14

## 2021-01-14 RX ORDER — BUPROPION HYDROCHLORIDE 300 MG/1
300 TABLET ORAL EVERY MORNING
Qty: 30 TABLET | Refills: 1 | Status: SHIPPED | OUTPATIENT
Start: 2021-01-14

## 2021-01-14 RX ORDER — MIRTAZAPINE 45 MG/1
45 TABLET, FILM COATED ORAL AT BEDTIME
Qty: 30 TABLET | Refills: 4 | Status: SHIPPED | OUTPATIENT
Start: 2021-01-14

## 2021-01-14 ASSESSMENT — PATIENT HEALTH QUESTIONNAIRE - PHQ9: SUM OF ALL RESPONSES TO PHQ QUESTIONS 1-9: 16

## 2021-01-14 NOTE — NURSING NOTE
"Chief Complaint   Patient presents with     Consult     PTSD, seasonal depression       Initial /70 (BP Location: Right arm, Patient Position: Sitting, Cuff Size: Adult Regular)   Pulse 84   Temp 97.3  F (36.3  C) (Tympanic)   Resp 18   Wt 63 kg (139 lb)   LMP 06/26/2008 (LMP Unknown)   SpO2 100%   BMI 21.77 kg/m   Estimated body mass index is 21.77 kg/m  as calculated from the following:    Height as of 1/7/21: 1.702 m (5' 7\").    Weight as of this encounter: 63 kg (139 lb).  Medication Reconciliation: complete    CHUCKY RIVERA, LPN  "
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
## 2021-01-15 ENCOUNTER — OFFICE VISIT (OUTPATIENT)
Dept: PODIATRY | Facility: OTHER | Age: 61
End: 2021-01-15
Attending: PODIATRIST
Payer: MEDICAID

## 2021-01-15 VITALS
TEMPERATURE: 97.6 F | HEART RATE: 91 BPM | WEIGHT: 139 LBS | SYSTOLIC BLOOD PRESSURE: 116 MMHG | OXYGEN SATURATION: 100 % | BODY MASS INDEX: 21.77 KG/M2 | DIASTOLIC BLOOD PRESSURE: 74 MMHG

## 2021-01-15 DIAGNOSIS — F17.210 CIGARETTE NICOTINE DEPENDENCE WITHOUT COMPLICATION: ICD-10-CM

## 2021-01-15 DIAGNOSIS — Z79.4 DIABETES MELLITUS TYPE 2, INSULIN DEPENDENT (H): ICD-10-CM

## 2021-01-15 DIAGNOSIS — I87.323 CHRONIC VENOUS HYPERTENSION (IDIOPATHIC) WITH INFLAMMATION OF BILATERAL LOWER EXTREMITY: ICD-10-CM

## 2021-01-15 DIAGNOSIS — M79.671 RIGHT FOOT PAIN: ICD-10-CM

## 2021-01-15 DIAGNOSIS — L97.412 DIABETIC ULCER OF RIGHT MIDFOOT ASSOCIATED WITH TYPE 2 DIABETES MELLITUS, WITH FAT LAYER EXPOSED (H): Primary | ICD-10-CM

## 2021-01-15 DIAGNOSIS — I70.234 ATHEROSCLEROSIS OF NATIVE ARTERY OF RIGHT LOWER EXTREMITY WITH ULCERATION OF MIDFOOT (H): ICD-10-CM

## 2021-01-15 DIAGNOSIS — E11.9 DIABETES MELLITUS TYPE 2, INSULIN DEPENDENT (H): ICD-10-CM

## 2021-01-15 DIAGNOSIS — Z71.6 TOBACCO ABUSE COUNSELING: ICD-10-CM

## 2021-01-15 DIAGNOSIS — E11.621 DIABETIC ULCER OF RIGHT MIDFOOT ASSOCIATED WITH TYPE 2 DIABETES MELLITUS, WITH FAT LAYER EXPOSED (H): Primary | ICD-10-CM

## 2021-01-15 PROCEDURE — 11042 DBRDMT SUBQ TIS 1ST 20SQCM/<: CPT

## 2021-01-15 PROCEDURE — 99213 OFFICE O/P EST LOW 20 MIN: CPT | Mod: 25 | Performed by: PODIATRIST

## 2021-01-15 PROCEDURE — G0463 HOSPITAL OUTPT CLINIC VISIT: HCPCS | Mod: 25

## 2021-01-15 PROCEDURE — 11042 DBRDMT SUBQ TIS 1ST 20SQCM/<: CPT | Performed by: PODIATRIST

## 2021-01-15 RX ORDER — CALCIUM CARBONATE/VITAMIN D3 500 MG-10
TABLET,CHEWABLE ORAL
COMMUNITY
Start: 2021-01-11

## 2021-01-15 RX ORDER — NALTREXONE HYDROCHLORIDE 50 MG/1
50 TABLET, FILM COATED ORAL EVERY MORNING
COMMUNITY
Start: 2020-12-17

## 2021-01-15 RX ORDER — ONDANSETRON 4 MG/1
TABLET, FILM COATED ORAL
COMMUNITY
Start: 2021-01-07

## 2021-01-15 ASSESSMENT — PAIN SCALES - GENERAL: PAINLEVEL: SEVERE PAIN (7)

## 2021-01-15 NOTE — TELEPHONE ENCOUNTER
Jen informed of Dr. Castañeda's response. Will call Marcela Barreto Monday to see if a PA is needed.     Keyonna Ambrosio CMA on 1/15/2021 at 4:51 PM

## 2021-01-15 NOTE — PROGRESS NOTES
Chief complaint: Patient presents with:  RECHECK      History of Present Illness: This 60 year old IDDM II female is seen for follow-up management of bilateral foot ulcerations.    She says she is down to taking Oxycodone at night. She is wondering if Tramadol can be prescribed for night pain.    She had a video conference with the vascular surgeon, Dr. Yates. She told the patient there were blood clots in both her legs. Dr. Yates told the patient patient she would not need a vascular surgery. The patient was told to elevate her feet 20-30 minutes five times a day. The patient was told she could do WB and to use ACE wraps from her toes to her thighs.    Patient says that slowly but surely, her wounds have been improving.    Her caretakers are chaning her dressings every other day with oil emulsion gauze, gauze and tape and betadine is being applied to the dried eschars. She says the wounds are draining through the dressing through daily.     No further pedal complaints today.     Patient is still smoking but she says she is down to about six cigarettes per day.    Last HbA1C was 6.6% on 09/23/2020.        /74   Pulse 91   Temp 97.6  F (36.4  C) (Tympanic)   Wt 63 kg (139 lb)   LMP 06/26/2008 (LMP Unknown)   SpO2 100%   BMI 21.77 kg/m      Patient Active Problem List   Diagnosis     Essential hypertension     Hypothyroidism     Episodic mood disorder (H)     Pure hypercholesterolemia     Tobacco use disorder     Esophageal reflux     Allergic rhinitis     Vitamin D deficiency     Controlled type 2 diabetes mellitus with complication, with long-term current use of insulin (H)     HYPERLIPIDEMIA LDL GOAL <100     S/P gastric bypass     Malnutrition, unspecified type (H)     Venous stasis of both lower extremities     Diabetic ulcer of left heel associated with type 2 diabetes mellitus, unspecified ulcer stage (H)       History reviewed. No pertinent surgical history.    Current Outpatient Medications    Medication     acetaminophen (TYLENOL) 500 MG tablet     albuterol (PROAIR HFA/PROVENTIL HFA/VENTOLIN HFA) 108 (90 Base) MCG/ACT inhaler     amoxicillin-clavulanate (AUGMENTIN) 875-125 MG tablet     aspirin 81 MG EC tablet     BD U/F III SHORT PEN NEEDLE 31G X 8 MM MISC     buPROPion (WELLBUTRIN XL) 150 MG 24 hr tablet     buPROPion (WELLBUTRIN XL) 300 MG 24 hr tablet     Calcium Carb-Cholecalciferol 500-10 MG-MCG CHEW     calcium carbonate-vitamin D (OS-BEST) 500-400 MG-UNIT tablet     DULoxetine (CYMBALTA) 20 MG capsule     ferrous sulfate (FEROSUL) 325 (65 Fe) MG tablet     fish oil-omega-3 fatty acids 1000 MG capsule     fluticasone (FLONASE) 50 MCG/ACT nasal spray     furosemide (LASIX) 20 MG tablet     gabapentin (NEURONTIN) 100 MG capsule     GLUCAGON EMERGENCY 1 MG kit     insulin aspart (NOVOLOG PEN) 100 UNIT/ML pen     insulin glargine (LANTUS PEN) 100 UNIT/ML pen     LACTOBACILLUS PO     lamoTRIgine (LAMICTAL) 100 MG tablet     lamoTRIgine (LAMICTAL) 200 MG tablet     levothyroxine (SYNTHROID/LEVOTHROID) 125 MCG tablet     lisinopril (ZESTRIL) 20 MG tablet     mirtazapine (REMERON) 45 MG tablet     mirtazapine (REMERON) 45 MG tablet     MULTIVITAMIN/IRON OR TABS     naltrexone (DEPADE/REVIA) 50 MG tablet     omeprazole (PRILOSEC) 40 MG DR capsule     ondansetron (ZOFRAN) 4 MG tablet     ONE TOUCH ULTRA BONUS PACK STRP   VI     ONETOUCH ULTRASOFT LANCETS MISC     oxyCODONE (ROXICODONE) 5 MG tablet     potassium chloride ER (KLOR-CON M) 20 MEQ CR tablet     QUEtiapine (SEROQUEL) 100 MG tablet     QUEtiapine (SEROQUEL) 50 MG tablet     rivaroxaban ANTICOAGULANT (XARELTO ANTICOAGULANT) 10 MG TABS tablet     Skin Protectants, Misc. (EUCERIN) cream     topiramate (TOPAMAX) 25 MG tablet     UNABLE TO FIND     valACYclovir (VALTREX) 500 MG tablet     vitamin D3 (CHOLECALCIFEROL) 2000 units tablet     No current facility-administered medications for this visit.           Allergies   Allergen Reactions     Codeine       Seasonal Allergies      Sulfa Drugs Rash     Vicodin [Hydrocodone-Acetaminophen] Rash       Family History   Problem Relation Age of Onset     Hypertension Mother      Diabetes Mother      Asthma Father      Diabetes Father      Hypertension Father      Cerebrovascular Disease Father      Circulatory Father      Eye Disorder Father        Social History     Socioeconomic History     Marital status:      Spouse name: None     Number of children: None     Years of education: None     Highest education level: None   Occupational History     None   Social Needs     Financial resource strain: None     Food insecurity     Worry: None     Inability: None     Transportation needs     Medical: None     Non-medical: None   Tobacco Use     Smoking status: Current Every Day Smoker     Packs/day: 0.50     Years: 46.00     Pack years: 23.00     Last attempt to quit: 2008     Years since quittin.3     Smokeless tobacco: Never Used     Tobacco comment: pt denied QP 20   Substance and Sexual Activity     Alcohol use: Not Currently     Comment: 1 liter of whiskey per day, last drank at 1100 3/5/2019     Drug use: No     Sexual activity: Not Currently   Lifestyle     Physical activity     Days per week: None     Minutes per session: None     Stress: None   Relationships     Social connections     Talks on phone: None     Gets together: None     Attends Yazdanism service: None     Active member of club or organization: None     Attends meetings of clubs or organizations: None     Relationship status: None     Intimate partner violence     Fear of current or ex partner: None     Emotionally abused: None     Physically abused: None     Forced sexual activity: None   Other Topics Concern     Parent/sibling w/ CABG, MI or angioplasty before 65F 55M? Not Asked   Social History Narrative     None       ROS: 10 point ROS neg other than the symptoms noted above in the HPI.  EXAM  Constitutional: healthy, alert and  no distress    Psychiatric: mentation appears normal and affect normal/bright    VASCULAR:  -Dorsalis pedis pulse non-palpable bilaterally   ---Not audible on doppler on RIGHT foot on 12/18/2020  ---Monophasic on doppler on LEFT foot on 12/18/2020  -Posterior tibial pulse +1/4 b/l  ---Monophasic on doppler bilaterally on 12/18/2020  -Hair growth Absent to b/l anterior legs and ankles  -Moderate 2+ pitting edema to bilateral foot  NEURO:  -Light touch sensation intact to b/l plantar forefoot  DERM:  -Skin temperature very cold to bilateral foot  -Skin shiny, atrophic to bilateral foot  -Moderate dependent rubor to bilateral foot with partial maintenance of rubor of RIGHT dorsal distal half of foot with elevation but no calor (very cool temperature)  -Toenails normal length but thickened, dystrophic and discolored x 10  Wound Location:  RIGHT dorsal foot  01/15/2021  Measurement:  2.3cm x 5cm x 0.1cm to subcutaneous tissue with newly epithelialized skin to the perimeter of the entire wound and a skin island  the most lateral aspect of the wound (approximately 0.4cm x 0.4cm) from the medial aspect of the wound (skin island estimated to measure 0.3cm)  Drainage:  Moderate serous  Odor:  None  Undermining:  None  Edges:  Fragile  Base:  Marbled 90% granular, 10% fibrotic  Surrounding Skin: Intact  No severe erythema, no ascending erythema, no calor, no purulence, no malodor, no other SOI.     12/18/2020  Measurement:  2.4cm x 5.8cm x 0.1cm to subcutaneous tissue with newly epithelialized skin to the proximal wound margins and to approximately 3cm of the recently healed wound medial to the open wound site (wound measurements do not include the epithelialized portion of the wound)  Drainage:  Moderate serous  Odor:  None  Undermining:  None  Edges:  Fragile  Base:  Marbled 80% fibrotic, 20% granular  Surrounding Skin: Intact  No severe erythema, no ascending erythema, no calor, no purulence, no malodor, no other  SOI.     12/18/2020:  3cm x 5.4cm x 0.1cm to subcutaneous tissue   ----------------------------------------------  Wound Location:  LEFT medial hallux IPJ  01/15/2021: 0.2cm x 0.3cm with a 100% well-adhered scab with no drainage    12/22/2020: 0.2cm x 0.3cm with a 100% well-adhered scab with no drainage    12/18/2020  Measurement:  0.2cm x 0.3cm x 0.1cm to sub tissue  Drainage:  Moderate serous  Odor:  None  Undermining:  None  Edges:  Intact  Base:  100% fibrotic  Surrounding Skin: Intact  No severe erythema, no ascending erythema, no calor, no purulence, no malodor, no other SOI.   ----------------------------------------------  Wound Location:  LEFT plantar 4th metatarsal head  01/15/2021: HEALED    12/22/2020:  0.2cm x 0.2cm with a 100% well-adhered scab with no drainage    12/18/2020  Measurement:  0.2cm x 0.2cm x 0.2cm to subcutaneous tissue   Drainage:  Moderate serous  Odor:  None  Undermining:  None  Edges:  Intact  Base:  100% viable post debridement of moderate overlying hyperkeratotic lesion   Surrounding Skin: Intact  No severe erythema, no ascending erythema, no calor, no purulence, no malodor, no other SOI.   ----------------------------------------------  Wound Location:  LEFT posterior heel  01/15/2021:  0.4cm x 0.4cm with a 100% well-adhered scab with no drainage    12/22/2020:  0.5cm x 0.3cm with a 100% well-adhered scab with no drainage    12/18/2020  Measurement:  0.5cm x 0.3cm x 0.1cm to subcutaneous tissue  Drainage:  Moderate serous  Odor:  None  Undermining:  None post debridement  Edges:  Intact with mild hyperkeratotic lesion   Base:  100% fibrotic  Surrounding Skin: Intact  No severe erythema, no ascending erythema, no calor, no purulence, no malodor, no other SOI.   MSK:  -Moderate pain on palpation to RIGHT dorsal foot ulceration  -Muscle strength of ankles +5/5 for dorsiflexion, plantarflexion, ABDUction and ADDuction b/l    MACHELLE 12/22/2020  FINDINGS:   The right dorsal pedis and  posterior tibial artery are noncompressible. Brachial pressures are 138.   The left ankle-brachial index is 1.5.   IMPRESSION: Above findings are compatible with atherosclerotic disease with elevated measurements on the left suggesting non-compressible disease and noncompressible disease demonstrated on the right.  KEELEY BURNS MD  ============================================================    ASSESSMENT:  (E11.621,  L97.412) Diabetic ulcer of right midfoot associated with type 2 diabetes mellitus, with fat layer exposed (H)  (primary encounter diagnosis)    (M79.671) Right foot pain    (E11.9,  Z79.4) Diabetes mellitus type 2, insulin dependent (H)    (I87.323) Chronic venous hypertension (idiopathic) with inflammation of bilateral lower extremity    (I70.234) Atherosclerosis of native artery of right lower extremity with ulceration of midfoot (H)      PLAN:  -Patient evaluated and examined. Treatment options discussed with no educational barriers noted.  -All LEFT foot wounds have a well-adhered scab with no drainage today and no SOI.  -RIGHT foot dorsal ulcer is smaller today with moderate drainage. Will increase dressing change to daily and wrote instructions for patient's caretakers to change the dressing twice daily if the wound is draining through the dressing with once per day dressing changes.  -Vascular: Patient had a video conference with Dr. Alisson Brown MD, through [facility].  -Discussed patient's falsely elevated MACHELLE readings from 12/21/2020 with patient on 12/22/2021. The vascular surgeon through [facility], Dr. Laquita Yates, was contacted on 12/22/2020 regarding patient's MACHELLE results. She had recommended a CTA to determine blood flow status. If there was a blockage, patient was told she may need a vascular intervention . If there was not a blockage, patient may need to see Rheumatology to address medications for her micro vascular disease.   ---Patient says she ended up having an  "appointment with vascular through [facility].  ---Patient was told she had blood clots in both legs for which she is now on a blood thinner. She was also told to wrap the legs day and night with ACE bandages from the toes to the thighs every day.    -Excisional debridement of wound on RIGHT dorsal foot with a sharp ring curette to subcutaneous tissue (<20 cm squared)  ---Applied topical lidocaine prior to debridement  ---LEFT foot wounds have no drainage and well-adhered scabs -- no dressings applied today  ---Due to tenderness, applied topical Lidocaine over the RIGHT dorsal foot ulcer again post debridement followed by oil emulsion gauze, gauze, Kerlix, Medipore tape  ---Applied Kerlix wrap and Medipore tape to the LEFT foot and distal leg  ---Applied a double long 4\" ACE wrap to the bilateral leg    -Patient was instructed to look for SOI (redness, swelling, pain, purulence, fever, chills, nausea, vomiting) and to return to podiatry or the emergency department immediately if there are any SOI.     -Patient may resume physical therapy without restrictions.    -Tobacco cessation -- The Quit Plan referral was offered and the patient is not interested in the referral today. Patient is not interested in quitting today. She says she has already cut down to six cigarettes per day and she does not want to quit more at this time.    -Patient in agreement with the above treatment plan and all of patient's questions were answered.      RTC two weeks to evaluate RIGHT dorsal foot ulcer    Reena Kelly DPM  "

## 2021-01-15 NOTE — NURSING NOTE
"Chief Complaint   Patient presents with     RECHECK       Initial /74   Pulse 91   Temp 97.6  F (36.4  C) (Tympanic)   Wt 63 kg (139 lb)   LMP 06/26/2008 (LMP Unknown)   SpO2 100%   BMI 21.77 kg/m   Estimated body mass index is 21.77 kg/m  as calculated from the following:    Height as of 1/7/21: 1.702 m (5' 7\").    Weight as of this encounter: 63 kg (139 lb).  Medication Reconciliation: complete  Graciela Hodge LPN    "

## 2021-01-15 NOTE — TELEPHONE ENCOUNTER
ICD-10-CM    1. Acute deep vein thrombosis (DVT) of proximal vein of lower extremity, unspecified laterality (H)  I82.4Y9 rivaroxaban ANTICOAGULANT (XARELTO) 15 MG TABS tablet     rivaroxaban ANTICOAGULANT (XARELTO ANTICOAGULANT) 20 MG TABS tablet      Orders Placed This Encounter   Medications     rivaroxaban ANTICOAGULANT (XARELTO) 15 MG TABS tablet     Sig: Take 1 tablet (15 mg) by mouth 2 times daily (with meals) for 13 days     Dispense:  26 tablet     Refill:  0     rivaroxaban ANTICOAGULANT (XARELTO ANTICOAGULANT) 20 MG TABS tablet     Sig: Take 1 tablet (20 mg) by mouth daily (with dinner)     Dispense:  90 tablet     Refill:  3     The 10 mg BID dose is incorrect.  SHould be 15 mg BID for 21 days, followed by 20 mg daily.    Signed, Abiel Castañeda MD, FAAP, FACP  Internal Medicine & Pediatrics

## 2021-01-18 ENCOUNTER — ALLIED HEALTH/NURSE VISIT (OUTPATIENT)
Dept: EDUCATION SERVICES | Facility: OTHER | Age: 61
End: 2021-01-18
Attending: INTERNAL MEDICINE
Payer: COMMERCIAL

## 2021-01-18 ENCOUNTER — TELEPHONE (OUTPATIENT)
Dept: EDUCATION SERVICES | Facility: OTHER | Age: 61
End: 2021-01-18

## 2021-01-18 DIAGNOSIS — E11.8 CONTROLLED TYPE 2 DIABETES MELLITUS WITH COMPLICATION, WITH LONG-TERM CURRENT USE OF INSULIN (H): Primary | Chronic | ICD-10-CM

## 2021-01-18 DIAGNOSIS — Z79.4 CONTROLLED TYPE 2 DIABETES MELLITUS WITH COMPLICATION, WITH LONG-TERM CURRENT USE OF INSULIN (H): Primary | Chronic | ICD-10-CM

## 2021-01-18 PROCEDURE — G0108 DIAB MANAGE TRN  PER INDIV: HCPCS

## 2021-01-18 NOTE — TELEPHONE ENCOUNTER
[facility] states they received 3 different prescriptions for Xarelto. Writer informed them that per Dr. Castañeda, she should take 15 mg BID for 21 days; then 20 mg daily. They stated insurance does not cover it. They will fax a PA request to clinic.     Keyonna Ambrosio CMA on 1/18/2021 at 10:46 AM

## 2021-01-18 NOTE — PROGRESS NOTES
"   Diabetes Education with BG Assessment    SUBJECTIVE:  Funmilayo Stratton is an 60 year old female who presents for evaluation and treatment of Type 2 diabetes mellitus.  Age at diagnosis \"about 27 years old\".   Previous treatment modalities employed include \"insulin pump about 5 years ago.\"   Current treatment includes SMBG and insulin injections.   Current monitoring regimen: home blood tests - multiple times daily. Patient requests to restart her Dexcom G6 CGM, \"all I need is sensors and a transmitter.  I have the G6 .\"      No results found for: GLUCOSE  Cholesterol   Date/Time Value Ref Range Status   2020 02:10  <200 mg/dL Final     Cholesterol/HDL Ratio   Date/Time Value Ref Range Status   2008 11:00 AM 5.0 0.0 - 5.0 Final     HDL Cholesterol   Date/Time Value Ref Range Status   2020 02:10 PM 62 23 - 92 mg/dL Final     LDL Cholesterol Calculated   Date/Time Value Ref Range Status   2020 02:10 PM 44 <100 mg/dL Final     Comment:     Desirable:       <100 mg/dl     No components found for: MICROALBCRU, [id number], MICROALBRAND    Social History     Tobacco Use     Smoking status: Current Every Day Smoker     Packs/day: 0.50     Years: 46.00     Pack years: 23.00     Last attempt to quit: 2008     Years since quittin.4     Smokeless tobacco: Never Used     Tobacco comment: pt said shes working on it on her own   Substance Use Topics     Alcohol use: Not Currently     Comment: 1 liter of whiskey per day, last drank at 1100 3/5/2019       Current Outpatient Rx   Medication Sig Dispense Refill     acetaminophen (TYLENOL) 500 MG tablet Take 1,000 mg by mouth as needed        albuterol (PROAIR HFA/PROVENTIL HFA/VENTOLIN HFA) 108 (90 Base) MCG/ACT inhaler Inhale 2 puffs into the lungs every 4 hours as needed for shortness of breath / dyspnea or wheezing       amoxicillin-clavulanate (AUGMENTIN) 875-125 MG tablet Take 1 tablet by mouth 2 times daily 20 tablet 0     " aspirin 81 MG EC tablet Take 81 mg by mouth daily       BD U/F III SHORT PEN NEEDLE 31G X 8 MM MISC Use QID as directed 100 prn     buPROPion (WELLBUTRIN XL) 150 MG 24 hr tablet Take 1 tablet (150 mg) by mouth every morning 7 tablet 1     buPROPion (WELLBUTRIN XL) 300 MG 24 hr tablet Take 1 tablet (300 mg) by mouth every morning 30 tablet 1     Calcium Carb-Cholecalciferol 500-10 MG-MCG CHEW CHEW AND SWALLOW 1 TABLET BY MOUTH TWICE DAILY       calcium carbonate-vitamin D (OS-BEST) 500-400 MG-UNIT tablet Take 1 tablet by mouth 2 times daily       DULoxetine (CYMBALTA) 20 MG capsule Take 1 capsule (20 mg) by mouth daily 90 capsule 3     ferrous sulfate (FEROSUL) 325 (65 Fe) MG tablet Take 325 mg by mouth 2 times daily       fish oil-omega-3 fatty acids 1000 MG capsule Take 1 g by mouth daily       fluticasone (FLONASE) 50 MCG/ACT nasal spray Spray 1 spray into both nostrils daily 48 g 3     furosemide (LASIX) 20 MG tablet Take 2 tablets (40 mg) by mouth daily 90 tablet 3     gabapentin (NEURONTIN) 100 MG capsule Take 1 capsule (100 mg) by mouth 3 times daily 90 capsule 3     GLUCAGON EMERGENCY 1 MG kit INJECT 1 MG 1 TIME as NEEDED FOR BLOOD GLUCOSE <60MG/DL       insulin aspart (NOVOLOG PEN) 100 UNIT/ML pen 1 unit per 15 gram carb TID AC + correction scale 15 mL 3     insulin glargine (LANTUS PEN) 100 UNIT/ML pen Inject 8 Units Subcutaneous every morning 15 mL 3     LACTOBACILLUS PO Give 1 capsule QD       lamoTRIgine (LAMICTAL) 100 MG tablet TAKE 1 TAB BY MOUTH TWICE A DAY       lamoTRIgine (LAMICTAL) 200 MG tablet Take 1 tablet (200 mg) by mouth At Bedtime 30 tablet 4     levothyroxine (SYNTHROID/LEVOTHROID) 125 MCG tablet Take 1 tablet (125 mcg) by mouth every morning 90 tablet 3     lisinopril (ZESTRIL) 20 MG tablet Take 0.5 tablets (10 mg) by mouth daily 90 tablet 3     mirtazapine (REMERON) 45 MG tablet Take 1 tablet (45 mg) by mouth At Bedtime 30 tablet 4     mirtazapine (REMERON) 45 MG tablet TAKE 1 TAB BY  "MOUTH AT BEDTIME       MULTIVITAMIN/IRON OR TABS take one daily 1 0     naltrexone (DEPADE/REVIA) 50 MG tablet Take 50 mg by mouth every morning       omeprazole (PRILOSEC) 40 MG DR capsule Take 1 capsule (40 mg) by mouth 2 times daily 90 capsule 3     ondansetron (ZOFRAN) 4 MG tablet TAKE 1 TABLET BY MOUTH EVERY 6 HOURS AS NEEDED FOR NAUSEA       ONE TOUCH ULTRA BONUS PACK STRP   VI Test 3-4 times each day 1 month until 2/09     ONETOUCH ULTRASOFT LANCETS MISC use 3-4 per day 1 box prn     oxyCODONE (ROXICODONE) 5 MG tablet Take 1 tablet (5 mg) by mouth At Bedtime 30 tablet 0     potassium chloride ER (KLOR-CON M) 20 MEQ CR tablet Take 20 mEq by mouth daily        QUEtiapine (SEROQUEL) 100 MG tablet Take 1.5 tablets (150 mg) by mouth At Bedtime 45 tablet 1     QUEtiapine (SEROQUEL) 50 MG tablet Take 100 mg by mouth At Bedtime       rivaroxaban ANTICOAGULANT (XARELTO ANTICOAGULANT) 10 MG TABS tablet Take 1 tablet (10 mg) by mouth 2 times daily (with meals) 20 tablet 0     [START ON 1/29/2021] rivaroxaban ANTICOAGULANT (XARELTO ANTICOAGULANT) 20 MG TABS tablet Take 1 tablet (20 mg) by mouth daily (with dinner) 90 tablet 3     rivaroxaban ANTICOAGULANT (XARELTO) 15 MG TABS tablet Take 1 tablet (15 mg) by mouth 2 times daily (with meals) for 13 days 26 tablet 0     Skin Protectants, Misc. (EUCERIN) cream Apply topically as needed for dry skin       topiramate (TOPAMAX) 25 MG tablet Take 1 tablet (25 mg) by mouth At Bedtime 30 tablet 1     UNABLE TO FIND MEDICATION NAME: Maalox, Mylanta, or generic equivalent as needed       valACYclovir (VALTREX) 500 MG tablet Take 500 mg by mouth daily       vitamin D3 (CHOLECALCIFEROL) 2000 units tablet Take 1 tablet by mouth daily          Allergies: Codeine, Seasonal allergies, Sulfa drugs, and Vicodin [hydrocodone-acetaminophen]     OBJECTIVE:  LMP 06/26/2008 (LMP Unknown)       ASSESSMENT:  Patient visits from [facility].  She shares history of gastric bypass \"I " "weighed 350 pounds and now I am down to 139 pounds.\"  Patient has history of insulin therapy and after weight loss, \"I still needed insulin, but I began to go low, so they took me off my pump.\"  Patient kept her bolus insulin the same, but just decreased the background insulin.  \"I am still at ICR 1:15 grams, and use sliding scale insulin, but Lantus was recently increased from 2 to 8 units now.\"    Patient shares she got her HbA1c under great control with the help of the Dexcom G6 CGM.  \"I really want that back, if possible.\"    BG results:       Average BG:  Fasting 98 - 386, pre-Lunch 59 - 314, 2-hr PP 41 - 187, pre-Supper 122 - 340 mg/dl.    5-day BG review shows BG range 41 - 386 mg/dL.  Due to extreme highs and lows, recommend Novolog Sliding Scale adjustment from medium dose to low dose.    Insulin recommendations:  1.  Continue Lantus 8 units every morning.    2.  Continue Novolog ICR 1:15 grams.    3.  Decrease units in Novolog Sliding Scale, begin low dose scale as:  Blood Glucose       Units of Novolog insulin  150  to  199 = 1 unit of extra insulin  200  to  249 = 2 units of extra insulin  250  to  299 = 3 units of extra insulin  300  to  349 = 4 units of extra insulin  Equal to or greater than 350 =   5 units of extra insulin          Educational Handouts Given:  Novolog Sliding Scale, My Food Plan, Nutrition in the Fast Suhail.    PLAN:        Medication recommendations: dosage change: See new Novolog Sliding Scale above.  Patient will test blood glucose as above or as previously directed.  Dexcom G6 CGM sent to [facility] per PCP approval.  Patient will follow meal plan, practice carbohydrate counting and label reading.  See My Food Plan and Nutrition in the Fast [employer] for more information.  Patient encouraged to develop physical activity plan or continue with current plan per PCP guidelines due to current foot ulcer and healing.  Patient will follow up with [facility] in 2 weeks, as scheduled, and " with provider as directed by PCP      Diabetes recommendations: use and side effects of insulin is reviewed, CGM discussed, glycohemoglobin monitoring discussed and long term diabetic complications discussed    Time spent with patient was 60 minutes.    Tressa Mora RN, BSN, [facility]  1/18/2021 2:57 PM

## 2021-01-18 NOTE — PATIENT INSTRUCTIONS
Diabetes Goals and Reminders    Your A1c test should be done every 3 months.  Your goal is less than 7%.   Your last result is:  Lab Results   Component Value Date    A1C 6.3 12/28/2020       Your LDL cholesterol test should be done at least once a year.  Take a statin, if prescribed by your doctor, based on age and risk factors.  Your last result is:  LDL Cholesterol Calculated   Date Value Ref Range Status   12/01/2020 44 <100 mg/dL Final     Comment:     Desirable:       <100 mg/dl       Have blood pressure and weight checked every three months.  Your blood pressure goal is 140/90 or less.  Your last blood pressure reading was:   BP Readings from Last 1 Encounters:   01/15/21 116/74       Test your blood sugar 4 times per day.  Your home blood glucose target ranges are:  Fasting or Before meals:   2 hours after a meal: Less than 180  Bedtime 100 - 140 mg/dL       Avoid all tobacco.  Follow your healthy diet and exercise plan.  See the eye doctor every year.  See the dentist every six months.  Have kidney function tested yearly.    Take all medicine as prescribed.  Please let me know if you are having symptoms you don t expect or if you wish to stop any medicine.    Follow Up Plan  Please call or visit Diabetes Education if blood sugars are consistently out of target.  Your future lab plan is:  HgA1c end of March/early April 2021.    If you need your cholesterol checked at your next appointment, you should fast 8 to 10 hours before your appointment.  Do not eat or drink anything besides water.  Drink plenty of water and take all your usual medicine.    SUMMARY FOR TODAY'S VISIT    Discussed BG ranges and insulin dosing.    Recommendations:  1.  Continue Lantus 8 units every morning.  2.  Continue Novolog ICR 1:15 grams of carb at meals.  3.  Change Novolog Sliding Scale from medium dose to low dose as:    Blood Glucose       Units of Novolog insulin  150  to  199 = 1 unit of extra insulin  200  to  249 = 2  units of extra insulin  250  to  299 = 3 units of extra insulin  300  to  349 = 4 units of extra insulin  Equal to or greater than 350 =   5 units of extra insulin     4.  Discussed restarting Dexcom G6 CGM.  Supplies will be sent to [facility], [place].    5.  Please follow up in 2 weeks, as scheduled, February 2, 9:00 am.    Tressa Mora RN, BSN, [facility]  1/18/2021 3:03 PM

## 2021-01-19 RX ORDER — PROCHLORPERAZINE 25 MG/1
SUPPOSITORY RECTAL
Qty: 3 EACH | Refills: 11 | Status: SHIPPED | OUTPATIENT
Start: 2021-01-19

## 2021-01-19 RX ORDER — PROCHLORPERAZINE 25 MG/1
SUPPOSITORY RECTAL
Qty: 1 EACH | Refills: 3 | Status: SHIPPED | OUTPATIENT
Start: 2021-01-19

## 2021-01-19 NOTE — TELEPHONE ENCOUNTER
"Patient shares she is out of Dexcom sensors and needs a new transmitter.  She would like to begin again to use CGM, if possible.  Patient states \"I received my supplies locally, did not need to use a mail pharmacy in the past.\"    If accepted as written, please sign pending orders.    Thank you,    Tressa Mora RN, BSN, [facility]  1/18/2021 6:16 PM          "

## 2021-01-27 ENCOUNTER — APPOINTMENT (OUTPATIENT)
Dept: CT IMAGING | Facility: OTHER | Age: 61
End: 2021-01-27
Attending: STUDENT IN AN ORGANIZED HEALTH CARE EDUCATION/TRAINING PROGRAM
Payer: MEDICAID

## 2021-01-27 ENCOUNTER — HOSPITAL ENCOUNTER (EMERGENCY)
Facility: OTHER | Age: 61
Discharge: NURSING, REHAB OR LONG-TERM CARE | End: 2021-01-27
Attending: STUDENT IN AN ORGANIZED HEALTH CARE EDUCATION/TRAINING PROGRAM | Admitting: STUDENT IN AN ORGANIZED HEALTH CARE EDUCATION/TRAINING PROGRAM
Payer: MEDICAID

## 2021-01-27 ENCOUNTER — APPOINTMENT (OUTPATIENT)
Dept: MRI IMAGING | Facility: OTHER | Age: 61
End: 2021-01-27
Attending: STUDENT IN AN ORGANIZED HEALTH CARE EDUCATION/TRAINING PROGRAM
Payer: MEDICAID

## 2021-01-27 VITALS
HEIGHT: 67 IN | RESPIRATION RATE: 11 BRPM | WEIGHT: 140 LBS | SYSTOLIC BLOOD PRESSURE: 166 MMHG | HEART RATE: 75 BPM | OXYGEN SATURATION: 100 % | DIASTOLIC BLOOD PRESSURE: 86 MMHG | BODY MASS INDEX: 21.97 KG/M2 | TEMPERATURE: 97.3 F

## 2021-01-27 DIAGNOSIS — E16.2 HYPOGLYCEMIA: ICD-10-CM

## 2021-01-27 DIAGNOSIS — M21.331 RIGHT WRIST DROP: ICD-10-CM

## 2021-01-27 LAB
ANION GAP SERPL CALCULATED.3IONS-SCNC: 7 MMOL/L (ref 3–14)
APTT PPP: 32 SEC (ref 22–37)
BASOPHILS # BLD AUTO: 0 10E9/L (ref 0–0.2)
BASOPHILS NFR BLD AUTO: 0.4 %
BUN SERPL-MCNC: 19 MG/DL (ref 7–25)
CALCIUM SERPL-MCNC: 8.2 MG/DL (ref 8.6–10.3)
CHLORIDE SERPL-SCNC: 108 MMOL/L (ref 98–107)
CO2 SERPL-SCNC: 25 MMOL/L (ref 21–31)
CREAT SERPL-MCNC: 0.79 MG/DL (ref 0.6–1.2)
DIFFERENTIAL METHOD BLD: ABNORMAL
EOSINOPHIL # BLD AUTO: 0 10E9/L (ref 0–0.7)
EOSINOPHIL NFR BLD AUTO: 0.6 %
ERYTHROCYTE [DISTWIDTH] IN BLOOD BY AUTOMATED COUNT: 13.9 % (ref 10–15)
GFR SERPL CREATININE-BSD FRML MDRD: 75 ML/MIN/{1.73_M2}
GLUCOSE SERPL-MCNC: 96 MG/DL (ref 70–105)
HCT VFR BLD AUTO: 28.7 % (ref 35–47)
HGB BLD-MCNC: 9.3 G/DL (ref 11.7–15.7)
IMM GRANULOCYTES # BLD: 0 10E9/L (ref 0–0.4)
IMM GRANULOCYTES NFR BLD: 0.4 %
INR PPP: 1.22 (ref 0.86–1.14)
INTERPRETATION ECG - MUSE: NORMAL
LYMPHOCYTES # BLD AUTO: 1.2 10E9/L (ref 0.8–5.3)
LYMPHOCYTES NFR BLD AUTO: 24.9 %
MCH RBC QN AUTO: 32.7 PG (ref 26.5–33)
MCHC RBC AUTO-ENTMCNC: 32.4 G/DL (ref 31.5–36.5)
MCV RBC AUTO: 101 FL (ref 78–100)
MONOCYTES # BLD AUTO: 0.3 10E9/L (ref 0–1.3)
MONOCYTES NFR BLD AUTO: 6.3 %
NEUTROPHILS # BLD AUTO: 3.2 10E9/L (ref 1.6–8.3)
NEUTROPHILS NFR BLD AUTO: 67.4 %
PLATELET # BLD AUTO: 271 10E9/L (ref 150–450)
POTASSIUM SERPL-SCNC: 4.1 MMOL/L (ref 3.5–5.1)
RBC # BLD AUTO: 2.84 10E12/L (ref 3.8–5.2)
SODIUM SERPL-SCNC: 140 MMOL/L (ref 134–144)
TROPONIN I SERPL-MCNC: 3.3 PG/ML
WBC # BLD AUTO: 4.8 10E9/L (ref 4–11)

## 2021-01-27 PROCEDURE — 70553 MRI BRAIN STEM W/O & W/DYE: CPT

## 2021-01-27 PROCEDURE — 99285 EMERGENCY DEPT VISIT HI MDM: CPT | Mod: 25 | Performed by: STUDENT IN AN ORGANIZED HEALTH CARE EDUCATION/TRAINING PROGRAM

## 2021-01-27 PROCEDURE — 255N000002 HC RX 255 OP 636: Performed by: STUDENT IN AN ORGANIZED HEALTH CARE EDUCATION/TRAINING PROGRAM

## 2021-01-27 PROCEDURE — 93010 ELECTROCARDIOGRAM REPORT: CPT | Performed by: INTERNAL MEDICINE

## 2021-01-27 PROCEDURE — 84484 ASSAY OF TROPONIN QUANT: CPT | Performed by: STUDENT IN AN ORGANIZED HEALTH CARE EDUCATION/TRAINING PROGRAM

## 2021-01-27 PROCEDURE — 80048 BASIC METABOLIC PNL TOTAL CA: CPT | Performed by: STUDENT IN AN ORGANIZED HEALTH CARE EDUCATION/TRAINING PROGRAM

## 2021-01-27 PROCEDURE — 70496 CT ANGIOGRAPHY HEAD: CPT | Mod: TC

## 2021-01-27 PROCEDURE — 258N000003 HC RX IP 258 OP 636: Performed by: STUDENT IN AN ORGANIZED HEALTH CARE EDUCATION/TRAINING PROGRAM

## 2021-01-27 PROCEDURE — 70450 CT HEAD/BRAIN W/O DYE: CPT | Mod: TC

## 2021-01-27 PROCEDURE — 29125 APPL SHORT ARM SPLINT STATIC: CPT | Performed by: STUDENT IN AN ORGANIZED HEALTH CARE EDUCATION/TRAINING PROGRAM

## 2021-01-27 PROCEDURE — 85730 THROMBOPLASTIN TIME PARTIAL: CPT | Performed by: STUDENT IN AN ORGANIZED HEALTH CARE EDUCATION/TRAINING PROGRAM

## 2021-01-27 PROCEDURE — 85610 PROTHROMBIN TIME: CPT | Performed by: STUDENT IN AN ORGANIZED HEALTH CARE EDUCATION/TRAINING PROGRAM

## 2021-01-27 PROCEDURE — 258N000001 HC RX 258: Performed by: STUDENT IN AN ORGANIZED HEALTH CARE EDUCATION/TRAINING PROGRAM

## 2021-01-27 PROCEDURE — A9575 INJ GADOTERATE MEGLUMI 0.1ML: HCPCS | Performed by: STUDENT IN AN ORGANIZED HEALTH CARE EDUCATION/TRAINING PROGRAM

## 2021-01-27 PROCEDURE — 93005 ELECTROCARDIOGRAM TRACING: CPT | Mod: XU | Performed by: STUDENT IN AN ORGANIZED HEALTH CARE EDUCATION/TRAINING PROGRAM

## 2021-01-27 PROCEDURE — 85025 COMPLETE CBC W/AUTO DIFF WBC: CPT | Performed by: STUDENT IN AN ORGANIZED HEALTH CARE EDUCATION/TRAINING PROGRAM

## 2021-01-27 PROCEDURE — 99284 EMERGENCY DEPT VISIT MOD MDM: CPT | Mod: 25 | Performed by: STUDENT IN AN ORGANIZED HEALTH CARE EDUCATION/TRAINING PROGRAM

## 2021-01-27 RX ORDER — DEXTROSE MONOHYDRATE 25 G/50ML
50 INJECTION, SOLUTION INTRAVENOUS ONCE
Status: COMPLETED | OUTPATIENT
Start: 2021-01-27 | End: 2021-01-27

## 2021-01-27 RX ORDER — IODIXANOL 320 MG/ML
100 INJECTION, SOLUTION INTRAVASCULAR ONCE
Status: COMPLETED | OUTPATIENT
Start: 2021-01-27 | End: 2021-01-27

## 2021-01-27 RX ADMIN — GADOTERATE MEGLUMINE 12 ML: 376.9 INJECTION INTRAVENOUS at 09:12

## 2021-01-27 RX ADMIN — IODIXANOL 100 ML: 320 INJECTION, SOLUTION INTRAVASCULAR at 06:31

## 2021-01-27 RX ADMIN — SODIUM CHLORIDE 500 ML: 9 INJECTION, SOLUTION INTRAVENOUS at 06:39

## 2021-01-27 RX ADMIN — DEXTROSE MONOHYDRATE 50 ML: 500 INJECTION PARENTERAL at 07:16

## 2021-01-27 ASSESSMENT — MIFFLIN-ST. JEOR: SCORE: 1247.67

## 2021-01-27 NOTE — ED TRIAGE NOTES
"Patient brought to ED by EMS from the [facility] because patient was unable to move or use right hand.  Patient was seen by nursing staff at 0130 today for low blood sugar, stated \"I was fine then and when I woke up I couldn't move my wrist.\"  LKW 0130.    EMS VS  /79  HR 77 O2 100% room air.  Patient  at time of arrival.  Patient alert and talking.  Denies pain. MD at bedside at time of arrival.   "

## 2021-01-27 NOTE — ED PROVIDER NOTES
History     Chief Complaint   Patient presents with     Neurologic Problem     HPI  Funmilayo Stratton is a 60 year old female with history of T2DM, HTN, hypothyroidism, HLD, tobacco use, malnutrition, GERD, s/p gastric bypass, recent diagnosis of bilateral lower extremity DVT's on xarelto who presents with right upper extremity weakness. Patient without any focal weakness when she went to bed last night; around 1:30 AM she woke up feeling unwell, had low blood glucose at that time so received oral supplementation; she first started to notice weakness in her right wrist at this time. She then woke up shortly before arrival around 5 AM and was completely unable to extend her right wrist; she also noticed decreased  strength in the right hand. She denies any weakness above there wrist including no elbow or shoulder weakness. She denies any numbness or tingling in the wrist or arm. She has chronic weakness in her right lower extremity related to infection there and decreased use. No slurred speech, headache or vision changes, no facial droop noted by EMS, and no confusion. No fevers/chills, nausea/vomiting, or other recent illness. BG subsequently normal after 1:30 AM check.     Allergies:  Allergies   Allergen Reactions     Codeine      Seasonal Allergies      Sulfa Drugs Rash     Vicodin [Hydrocodone-Acetaminophen] Rash       Problem List:    Patient Active Problem List    Diagnosis Date Noted     S/P gastric bypass 12/01/2020     Priority: Medium     Malnutrition, unspecified type (H) 12/01/2020     Priority: Medium     Venous stasis of both lower extremities 12/01/2020     Priority: Medium     Diabetic ulcer of left heel associated with type 2 diabetes mellitus, unspecified ulcer stage (H) 12/01/2020     Priority: Medium     HYPERLIPIDEMIA LDL GOAL <100 05/09/2010     Priority: Medium     Controlled type 2 diabetes mellitus with complication, with long-term current use of insulin (H) 05/02/2010     Priority:  Medium     Vitamin D deficiency 2008     Priority: Medium     (Problem list name updated by automated process. Provider to review and confirm.)       Allergic rhinitis 2008     Priority: Medium     Problem list name updated by automated process. Provider to review       Esophageal reflux 2008     Priority: Medium     Tobacco use disorder 11/15/2007     Priority: Medium     Pure hypercholesterolemia 10/17/2006     Priority: Medium     Episodic mood disorder (H) 2006     Priority: Medium     Problem list name updated by automated process. Provider to review       Essential hypertension 2006     Priority: Medium     Problem list name updated by automated process. Provider to review       Hypothyroidism 2006     Priority: Medium     Problem list name updated by automated process. Provider to review          Past Medical History:    Past Medical History:   Diagnosis Date     DIABETES MELLITUS TYPE II-UNCOMPL 2006     HYPERTENSION NOS 2006     HYPOTHYROIDISM NOS 2006     Vitamin D deficiencies 2008       Past Surgical History:    No past surgical history on file.    Family History:    Family History   Problem Relation Age of Onset     Hypertension Mother      Diabetes Mother      Asthma Father      Diabetes Father      Hypertension Father      Cerebrovascular Disease Father      Circulatory Father      Eye Disorder Father        Social History:  Marital Status:   [4]  Social History     Tobacco Use     Smoking status: Current Every Day Smoker     Packs/day: 0.50     Years: 46.00     Pack years: 23.00     Last attempt to quit: 2008     Years since quittin.4     Smokeless tobacco: Never Used     Tobacco comment: pt said shes working on it on her own   Substance Use Topics     Alcohol use: Not Currently     Comment: 1 liter of whiskey per day, last drank at 1100 3/5/2019     Drug use: No        Medications:         acetaminophen (TYLENOL) 500 MG  tablet       albuterol (PROAIR HFA/PROVENTIL HFA/VENTOLIN HFA) 108 (90 Base) MCG/ACT inhaler       amoxicillin-clavulanate (AUGMENTIN) 875-125 MG tablet       aspirin 81 MG EC tablet       buPROPion (WELLBUTRIN XL) 150 MG 24 hr tablet       buPROPion (WELLBUTRIN XL) 300 MG 24 hr tablet       DULoxetine (CYMBALTA) 20 MG capsule       ferrous sulfate (FEROSUL) 325 (65 Fe) MG tablet       fish oil-omega-3 fatty acids 1000 MG capsule       fluticasone (FLONASE) 50 MCG/ACT nasal spray       furosemide (LASIX) 20 MG tablet       gabapentin (NEURONTIN) 100 MG capsule       insulin aspart (NOVOLOG PEN) 100 UNIT/ML pen       insulin glargine (LANTUS PEN) 100 UNIT/ML pen       lamoTRIgine (LAMICTAL) 100 MG tablet       lamoTRIgine (LAMICTAL) 200 MG tablet       levothyroxine (SYNTHROID/LEVOTHROID) 125 MCG tablet       lisinopril (ZESTRIL) 20 MG tablet       mirtazapine (REMERON) 45 MG tablet       mirtazapine (REMERON) 45 MG tablet       omeprazole (PRILOSEC) 40 MG DR capsule       oxyCODONE (ROXICODONE) 5 MG tablet       QUEtiapine (SEROQUEL) 100 MG tablet       rivaroxaban ANTICOAGULANT (XARELTO ANTICOAGULANT) 10 MG TABS tablet       topiramate (TOPAMAX) 25 MG tablet       BD U/F III SHORT PEN NEEDLE 31G X 8 MM MISC       Calcium Carb-Cholecalciferol 500-10 MG-MCG CHEW       calcium carbonate-vitamin D (OS-BEST) 500-400 MG-UNIT tablet       Continuous Blood Gluc Sensor (DEXCOM G6 SENSOR) MISC       Continuous Blood Gluc Transmit (DEXCOM G6 TRANSMITTER) MISC       GLUCAGON EMERGENCY 1 MG kit       LACTOBACILLUS PO       MULTIVITAMIN/IRON OR TABS       naltrexone (DEPADE/REVIA) 50 MG tablet       ondansetron (ZOFRAN) 4 MG tablet       ONE TOUCH ULTRA BONUS PACK STRP   VI       ONETOUCH ULTRASOFT LANCETS MISC       potassium chloride ER (KLOR-CON M) 20 MEQ CR tablet       QUEtiapine (SEROQUEL) 50 MG tablet       [START ON 1/29/2021] rivaroxaban ANTICOAGULANT (XARELTO ANTICOAGULANT) 20 MG TABS tablet       rivaroxaban  "ANTICOAGULANT (XARELTO) 15 MG TABS tablet       Skin Protectants, Misc. (EUCERIN) cream       UNABLE TO FIND       valACYclovir (VALTREX) 500 MG tablet       vitamin D3 (CHOLECALCIFEROL) 2000 units tablet          Review of Systems  10-point ROS complete and negative other than as noted in HPI above.    Physical Exam   BP: 113/73  Pulse: 76  Temp: 97.3  F (36.3  C)  Resp: 18  Height: 170.2 cm (5' 7\")  Weight: 63.5 kg (140 lb)  SpO2: 99 %      Physical Exam  Gen: Lying in bed, no acute distress, alert  HEENT: NC/AT, MMM, oropharynx clear, conjunctivae clear  CV: RRR, appears warm and well-perfused  Pulm: CTAB, normal respiratory effort  Abd: Soft, NT, ND, no guarding/rebound  MSK: Right wrist drop, otherwise full ROM at elbow and shoulder  Skin: Cellulitis about right ankle (bandaged), scattered ecchymoses and bruising to BUE in various stages  Neuro: A&O x4, CN II-XII intact, PERRL, EOMI, 0/5 right wrist extension, flexion intact, mild reduction in  strength; sensation to light touch intact; 4+/5 strength RLE including hip flexion and knee extension (patient reports this is chronic), ankle plantar and dorsiflexion intact    ED Course     ED Course as of Jan 27 0733 Wed Jan 27, 2021   0602 Patient evaluated immediately on arrival, suspect radial nerve palsy however with questionable RLE weakness and symptoms within 24 hours (though outside tPA window at 4.5 hours, and patient on xarelto thus contraindicated) as well, a stroke code was called      0626 I spoke with stroke neurology, plan to follow-up CT/CTA, agreed patient not a tPA candidate      0633 EKG reviewed, normal sinus rhythm, no STEMI or current of injury      0636 I spoke with neuro stroke, CT/CTA appears negative; only possibility could be high cortical or isolated focal left precentral gyrus stroke so recommends getting MRI to rule this out; if negative no further work-up needed, likely then peripheral etiology in which will proceed with volar slab " splint in 60 degrees dorsiflexion, outpatient OT and hand ortho f/u      0647 BMP within normal limits, CBC with anemia (hemoglobin 9.3), otherwise unremarkable      0710 CTA head and neck final report: Negative CTA head, impression of neck follows:    1. Severe atherosclerosis and stenosis of the origin of the left vertebral   artery due to atherosclerotic calcification. No other high-grade stenosis or   occlusion identified.   2. Scattered ground-glass opacities and air trapping throughout the lungs,   which can be seen in small airways disease.       0712 Report from RN that patient feeling unwell, POC glucose checked and low at <20; rechecked and POC glucose of 21, given choice; had lost IV access, will work on new access and give 1 amp D50      0713 Troponin within normal limits, INR slightly elevated at 1.22      0732 I spoke with Dr. Elmore, plan to monitor POC glucose q1h down here in the ED while awaiting MRI result, if MRI negative and glucose stabilizes anticipate discharge, otherwise admit        [facility]    Splint Application    Date/Time: 1/27/2021 7:30 AM  Performed by: Joe London MD  Authorized by: Joe London MD       PRE-PROCEDURE DETAILS     Sensation:  Normal    Skin color:  Pink    PROCEDURE DETAILS     Laterality:  Right    Location:  Wrist    Wrist:  R wrist    Strapping: no      Splint type:  Volar short arm (45 degrees dorsiflexion)    Supplies:  Cotton padding, elastic bandage and Ortho-Glass    POST PROCEDURE DETAILS     Pain:  Unchanged    Sensation:  Normal    Skin color:  Pink    Patient tolerance of procedure:  Patient tolerated the procedure well with no immediate complications    PROCEDURE   Patient Tolerance:  Patient tolerated the procedure well with no immediate complications                     Critical Care time:  none     The patient has stroke symptoms:         ED Stroke specific documentation           NIHSS PDF      Patient last known well time: 1:30 AM  ED Provider first to bedside at: 6:02 AM  CT Results received at: 6:34 AM    tPA:   Not given due to stroke mimic: saturday night palsy and DOAC dose within 48 hours or INR > 1.7.    If treating with tPA: Ensure SBP<180 and DBP<105 prior to treatment with IV tPA.  Administering IV tPA after treatment with IV labetalol, hydralazine, or nicardipine is reasonable once BP control is established.    Endovascular Retrieval:  Not initiated due to absence of proximal vessel occlusion    [employer] Stroke Scale (Baseline)  Time Performed: 6:03 AM     Score    Level of consciousness: (0)   Alert, keenly responsive    LOC questions: (0)   Answers both questions correctly    LOC commands: (0)   Performs both tasks correctly    Best gaze: (0)   Normal    Visual: (0)   No visual loss    Facial palsy: (0)   Normal symmetrical movements    Motor arm (left): (0)   No drift    Motor arm (right): (0)   No drift    Motor leg (left): (0)   No drift    Motor leg (right): (0)   No drift    Limb ataxia: (0)   Absent    Sensory: (0)   Normal- no sensory loss    Best language: (0)   Normal- no aphasia    Dysarthria: (0)   Normal    Extinction and inattention: (0)   No abnormality        Total Score:  0        Stroke Mimics were considered (including migraine headache, seizure disorder, hypoglycemia (or hyperglycemia), head or spinal trauma, CNS infection, Toxin ingestion and shock state (e.g. sepsis) .                 Results for orders placed or performed during the hospital encounter of 01/27/21 (from the past 24 hour(s))   CBC with platelets differential   Result Value Ref Range    WBC 4.8 4.0 - 11.0 10e9/L    RBC Count 2.84 (L) 3.8 - 5.2 10e12/L    Hemoglobin 9.3 (L) 11.7 - 15.7 g/dL    Hematocrit 28.7 (L) 35.0 - 47.0 %     (H) 78 - 100 fl    MCH 32.7 26.5 - 33.0 pg    MCHC 32.4 31.5 - 36.5 g/dL    RDW 13.9 10.0 - 15.0 %    Platelet Count 271 150 - 450 10e9/L    Diff  Method Automated Method     % Neutrophils 67.4 %    % Lymphocytes 24.9 %    % Monocytes 6.3 %    % Eosinophils 0.6 %    % Basophils 0.4 %    % Immature Granulocytes 0.4 %    Absolute Neutrophil 3.2 1.6 - 8.3 10e9/L    Absolute Lymphocytes 1.2 0.8 - 5.3 10e9/L    Absolute Monocytes 0.3 0.0 - 1.3 10e9/L    Absolute Eosinophils 0.0 0.0 - 0.7 10e9/L    Absolute Basophils 0.0 0.0 - 0.2 10e9/L    Abs Immature Granulocytes 0.0 0 - 0.4 10e9/L   Basic metabolic panel   Result Value Ref Range    Sodium 140 134 - 144 mmol/L    Potassium 4.1 3.5 - 5.1 mmol/L    Chloride 108 (H) 98 - 107 mmol/L    Carbon Dioxide 25 21 - 31 mmol/L    Anion Gap 7 3 - 14 mmol/L    Glucose 96 70 - 105 mg/dL    Urea Nitrogen 19 7 - 25 mg/dL    Creatinine 0.79 0.60 - 1.20 mg/dL    GFR Estimate 75 >60 mL/min/[1.73_m2]    GFR Estimate If Black >90 >60 mL/min/[1.73_m2]    Calcium 8.2 (L) 8.6 - 10.3 mg/dL   INR   Result Value Ref Range    INR 1.22 (H) 0.86 - 1.14   Partial thromboplastin time   Result Value Ref Range    PTT 32 22 - 37 sec   Troponin GH   Result Value Ref Range    Troponin 3.3 <34.0 pg/mL   CT Head w/o Contrast    Addendum: 1/27/2021    Addendum created by Jason Bullard MD on 1/27/2021 6:41:18 AM CST:  The findings were verbally communicated via telephone conference at 6:41 AM CST   on 1/27/2021 with ALIN BAPTISTE.   The findings were acknowledged and understood.          Narrative    Initial report created on 1/27/2021 6:33:56 AM CST:    PROCEDURE INFORMATION:   Exam: CT Head Without Contrast   Exam date and time: 1/27/2021 6:17 AM   Age: 60 years old   Clinical indication: Weakness, extremity; Right     TECHNIQUE:   Imaging protocol: Computed tomography of the head without contrast.   Radiation optimization: All CT scans at this facility use at least one of these   dose optimization techniques: automated exposure control; mA and/or kV   adjustment per patient size (includes targeted exams where dose is matched to    clinical indication); or iterative reconstruction.   Other technique: STROKE PROTOCOL was implemented.     COMPARISON:   No relevant prior studies available.     FINDINGS:   Brain: Normal. No hemorrhage. Unremarkable white matter. No mass effect.   Cerebral ventricles: No ventriculomegaly.   Bones/joints: Unremarkable. No acute fracture.   Paranasal sinuses: Visualized sinuses are unremarkable. No fluid levels.   Mastoid air cells: Visualized mastoid air cells are well aerated.   Soft tissues: Unremarkable.       Impression    IMPRESSION:   No acute intracranial abnormality.     ASSESSMENT:   ASPECTS ([place] Stroke Program Early CT Score) is 10.     THIS DOCUMENT HAS BEEN ELECTRONICALLY SIGNED BY JASON BULLARD MD   CTA Head Neck with Contrast    Addendum: 1/27/2021    Addendum created by Jason Bullard MD on 1/27/2021 7:10:45 AM CST:  The findings were verbally communicated via telephone conference at 7:10 AM CST   on 1/27/2021 with ALIN BAPTISTE.   The findings were acknowledged and understood.          Narrative    Initial report created on 1/27/2021 7:05:59 AM CST:    PROCEDURE INFORMATION:   Exam: CT Angiography Head With Contrast   Exam date and time: 1/27/2021 6:18 AM   Age: 60 years old   Clinical indication: Weakness; Additional info: Evaluate for   dissection/thromboembolism     TECHNIQUE:   Imaging protocol: Computed tomography angiography of the head with intravenous   contrast.   3D rendering (Not supervised by radiologist): MIP and/or 3D reconstructed   images were created by the technologist.   Radiation optimization: All CT scans at this facility use at least one of these   dose optimization techniques: automated exposure control; mA and/or kV   adjustment per patient size (includes targeted exams where dose is matched to   clinical indication); or iterative reconstruction.   Contrast material: VISIPAQUE 320; Contrast volume: 100 ml; Contrast route:   INTRAVENOUS (IV);       COMPARISON:   No relevant prior studies available.     FINDINGS:     ANTERIOR CIRCULATION:   Right internal carotid artery: Mild atherosclerotic calcification of the right   carotid siphon.   Right middle cerebral artery: Unremarkable. No occlusion or significant   stenosis. No aneurysm.    Right anterior cerebral artery: Unremarkable. No occlusion or significant   stenosis. No aneurysm.      Left internal carotid artery: Mild atherosclerotic calcification of the left   carotid siphon.   Left middle cerebral artery: Unremarkable. No occlusion or significant   stenosis. No aneurysm.    Left anterior cerebral artery: Unremarkable. No occlusion or significant   stenosis. No aneurysm.      POSTERIOR CIRCULATION:   Right vertebral artery: Unremarkable. No occlusion or significant stenosis. No   aneurysm.    Left vertebral artery: Unremarkable. No occlusion or significant stenosis. No   aneurysm.    Basilar artery: Unremarkable. No occlusion or significant stenosis. No   aneurysm.   Right posterior cerebral artery: Fetal origin of the right PCA.   Left posterior cerebral artery: Unremarkable. No occlusion or significant   stenosis. No aneurysm.      Brain: No definite mass, mass effect, or midline shift.   Cerebral ventricles: No ventriculomegaly.     Bones/joints: Unremarkable. No acute fracture.   Soft tissues: Unremarkable.       Impression    IMPRESSION:   No high-grade stenosis or occlusion.       =========================  PROCEDURE INFORMATION:   Exam: CT Angiography Neck With Contrast   Exam date and time: 1/27/2021 6:18 AM   Age: 60 years old   Clinical indication: Weakness; Additional info: Evaluate for   dissection/thromboembolism     TECHNIQUE:   Imaging protocol: Computed tomography angiography of the neck with intravenous   contrast.   3D rendering (Not supervised by radiologist): MIP and/or 3D reconstructed   images were created by the technologist.   Radiation optimization: All CT scans at this  facility use at least one of these   dose optimization techniques: automated exposure control; mA and/or kV   adjustment per patient size (includes targeted exams where dose is matched to   clinical indication); or iterative reconstruction.   Contrast material: VISIPAQUE 320; Contrast volume: 100 ml; Contrast route:   INTRAVENOUS (IV);      COMPARISON:   No relevant prior studies available.     FINDINGS:   Right common carotid artery: No stenosis. No dissection or occlusion.   Right internal carotid artery: No stenosis of the extracranial segment. No   dissection or occlusion.   Right external carotid artery: No occlusion or stenosis of the origin.    Right vertebral artery: No stenosis. No dissection or occlusion.     Left common carotid artery: No stenosis. No dissection or occlusion.   Left internal carotid artery: No stenosis of the extracranial segment. No   dissection or occlusion.   Left external carotid artery: No occlusion or stenosis of the origin.    Left vertebral artery: Severe atherosclerosis and stenosis of the origin of the   left vertebral artery due to atherosclerotic calcification.     Bones/joints: Status post posterior fixation of the thoracic spine, partially   visualized without evidence of hardware complication. The cervical spine   demonstrates moderate degenerative changes at multiple levels.   Soft tissues: Normal. No significant soft tissue swelling.     Lungs: Scattered ground-glass opacities and air trapping throughout the lungs,   which can be seen in small airways disease.     IMPRESSION:   1. Severe atherosclerosis and stenosis of the origin of the left vertebral   artery due to atherosclerotic calcification. No other high-grade stenosis or   occlusion identified.   2. Scattered ground-glass opacities and air trapping throughout the lungs,   which can be seen in small airways disease.     REFERENCES:   NASCET CRITERIA. The degree of internal carotid artery stenosis is based on    NASCET criteria. Normal is no stenosis. Mild is less than 50% stenosis.   Moderate is 50-69% stenosis. Severe is 70% to 99% stenosis. Total occlusion is   no detectable patent lumen.     THIS DOCUMENT HAS BEEN ELECTRONICALLY SIGNED BY OSCAR MARTIN MD   EKG 12-lead, tracing only   Result Value Ref Range    Interpretation ECG Click View Image link to view waveform and result        Medications   0.9% sodium chloride BOLUS (500 mLs Intravenous New Bag 1/27/21 0639)   iodixanol (VISIPAQUE 320) injection 100 mL (100 mLs Intravenous Given 1/27/21 0631)   dextrose 50 % injection 50 mL (50 mLs Intravenous Given 1/27/21 0716)       Assessments & Plan (with Medical Decision Making)   60-year-old woman with history of T2DM, HTN, HLD, s/p gastric bypass, RLE cellulitis, bilateral DVT's on xarelto who presents with right upper extremity weakness, found to have right wrist drop likely secondary to radial nerve palsy vs atypical localized stroke; also with history of hypoglycemia earlier tonight. Vitally stable on arrival, afebrile. Pertinent MDM in ED course above; in brief CT head and neck angiogram and non-contrast CT head negative for acute pathology, does have severe atherosclerotic changes in left vertebral artery but no total occlusion. Patient not a tPA candidate and in discussion with stroke neurology obtaining MRI to rule out localized left precentral gyrus stroke or similar. Patient placed in volar slab splint for right radial nerve palsy, will need OT and hand orhtho f/u, ideally work on getting wrist splint with outrigger set-up through her nursing home the [facility]. However subsequently patient feeling unwell, POC glucose low <20, giving juice and D50 with improvement; no evidence of systemic infection, concern for accidental insulin overdose, plan to monitor POC glucose frequently while awaiting MRI then if still dropping admit, or if MRI with evidence of stroke reach back out to stroke neurology and admit for  this; otherwise could discharge with above plan for outpatient treatment of radial nerve palsy if glucose stabilizes. Sign out given to Dr. Oh at shift change.    I have reviewed the nursing notes.    I have reviewed the findings, diagnosis, plan and need for follow up with the patient.    Joe London MD  01/27/21 6221    CONTINUATION OF CARE    Reviewed Dr. Johnson's excellent note above.  Also in person patient handoff was completed with Dr. Johnson.  We are to follow patient's blood sugars while she was waiting for MRI.  He did discuss with Dr. Elmore but hospitalist recommended further observation in the ED before decision to admit.  MRI is reassuring against acute infarct.  Blood sugars have improved over the course of her several hour emergency department stay and are now stable.  Nurse to nurse the report will be given back to [facility] to ensure medication adherence.  Follow-up as above.  Return to ED with recurrence of symptoms, change in pattern of symptoms, severe headache visual changes facial droop or progressive weakness.  Patient verbalized understanding plan is agreement left ED in improved condition.       New Prescriptions    No medications on file       Final diagnoses:   Right wrist drop   Hypoglycemia       1/27/2021   [facility]        Jackson Carmen MD  01/27/21 0987

## 2021-01-27 NOTE — CONSULTS
"[facility]    Stroke Telephone Note    I was called by Dr. Joe Johnson on 01/27/21  regarding patient Funmilayo Stratton. The patient is a 60 year old female with past medical hx of DM HTN currently on xeralto due to lower extremity DVT presents with R hand weakness. Per report she was normal last night before going to bed. Noticed R hand weakness on waking up at 0130 and was found to have low blood sugar. She then went to bed and woke up around 5 AM today with right hand weakness. On examination per ER physician he is seeing wrist drop. No significant weakness above the wrist proximally in R arm muscles. She mentions right leg weakness which appears to be chronic.    Stroke Code Data  (for stroke code without tele)  Stroke code activated 01/27/21   0621   First stroke provider response         Last known normal 01/26/21   (before bed)   Time of discovery   (or onset of symptoms) 01/27/21   0500   Head CT read by me 01/27/21   0631   Was stroke code de-escalated? Yes 01/27/21 (Requested for de escalation page @ 0499)  presence of contraindications for both intravenous and intra-arterial stroke treatments       TPA Treatment   Not given due to DOAC dose within 48 hours or INR > 1.7.    Endovascular Treatment  Not initiated due to absence of proximal vessel occlusion    Impression  R hand weakness.     Recommendations  Given her vascular risk factor will recommend obtaining MRI brain to rule out a high cortical stroke in hand knob area.      My recommendations are based on the information provided over the phone by Funmilayo Stratton's in-person providers. They are not intended to replace the clinical judgment of her in-person providers. I was not requested to personally see or examine the patient at this time.    Fredrick Cuello MD  Vascular Neurology  To page me or covering stroke neurology team member, click here: AMCOM   Choose \"On Call\" tab at top, then search dropdown box for " "\"Neurology Adult\", select location, press Enter, then look for stroke/neuro ICU/telestroke.           "

## 2021-01-27 NOTE — ED NOTES
MD notified of BG less than 20 per glucometer. Patient alert and talkative. Drank 8 ounces of juice. Tolerated well.

## 2021-01-27 NOTE — PROGRESS NOTES
1.  Has the patient had a previous reaction to IV contrast?      2.  Does the patient have kidney disease?      3.  Is the patient on dialysis?      If YES to any of these questions, exam will be reviewed with a Radiologist before administering contrast.    STROKE CODE

## 2021-01-27 NOTE — ED NOTES
after D50. See MAR. Patient eating toast and drinking more milk and juice. Patient states she feels better. Per MD, he does not want lab to come up and draw glucose on patient.

## 2021-01-28 ENCOUNTER — OFFICE VISIT (OUTPATIENT)
Dept: FAMILY MEDICINE | Facility: OTHER | Age: 61
End: 2021-01-28
Attending: PSYCHIATRY & NEUROLOGY
Payer: MEDICAID

## 2021-01-28 VITALS
BODY MASS INDEX: 21.93 KG/M2 | WEIGHT: 140 LBS | SYSTOLIC BLOOD PRESSURE: 102 MMHG | DIASTOLIC BLOOD PRESSURE: 60 MMHG | HEART RATE: 85 BPM | RESPIRATION RATE: 18 BRPM | TEMPERATURE: 96.2 F | OXYGEN SATURATION: 100 %

## 2021-01-28 DIAGNOSIS — F51.05 INSOMNIA DUE TO OTHER MENTAL DISORDER: Primary | ICD-10-CM

## 2021-01-28 DIAGNOSIS — F99 INSOMNIA DUE TO OTHER MENTAL DISORDER: Primary | ICD-10-CM

## 2021-01-28 DIAGNOSIS — F31.9 BIPOLAR I DISORDER (H): ICD-10-CM

## 2021-01-28 PROCEDURE — 99215 OFFICE O/P EST HI 40 MIN: CPT | Performed by: PSYCHIATRY & NEUROLOGY

## 2021-01-28 PROCEDURE — G0463 HOSPITAL OUTPT CLINIC VISIT: HCPCS

## 2021-01-28 RX ORDER — LAMOTRIGINE 200 MG/1
200 TABLET ORAL AT BEDTIME
Qty: 30 TABLET | Refills: 4 | Status: SHIPPED | OUTPATIENT
Start: 2021-01-28

## 2021-01-28 RX ORDER — FUROSEMIDE 40 MG
40 TABLET ORAL EVERY MORNING
COMMUNITY
Start: 2021-01-09

## 2021-01-28 RX ORDER — LISINOPRIL 10 MG/1
TABLET ORAL
COMMUNITY
Start: 2021-01-09

## 2021-01-28 RX ORDER — TOPIRAMATE 50 MG/1
50 TABLET, FILM COATED ORAL AT BEDTIME
Qty: 30 TABLET | Refills: 4 | Status: SHIPPED | OUTPATIENT
Start: 2021-01-28

## 2021-01-28 ASSESSMENT — PATIENT HEALTH QUESTIONNAIRE - PHQ9: SUM OF ALL RESPONSES TO PHQ QUESTIONS 1-9: 13

## 2021-01-28 NOTE — NURSING NOTE
"Chief Complaint   Patient presents with     Depression     follow up       Initial /60 (BP Location: Right arm, Patient Position: Sitting, Cuff Size: Adult Regular)   Pulse 85   Temp 96.2  F (35.7  C) (Tympanic)   Resp 18   Wt 63.5 kg (140 lb)   LMP 06/26/2008 (LMP Unknown)   SpO2 100%   BMI 21.93 kg/m   Estimated body mass index is 21.93 kg/m  as calculated from the following:    Height as of 1/27/21: 1.702 m (5' 7\").    Weight as of this encounter: 63.5 kg (140 lb).  Medication Reconciliation: complete    CHUCKY RIVERA, LPN  "

## 2021-01-28 NOTE — PROGRESS NOTES
"Psychiatric Progress Note/Visit  January 28, 2021    Identifying Data:  This is a 60-year-old woman seen in psychiatric medication management follow-up visit for treatment of bipolar 1 disorder, PTSD, and seasonal affective disorder    Interval History:  Patient was seen on January 14, 2021 for a diagnostic assessment.  At that time Seroquel was increased to 250 mg Topamax was added at night for flashback dreams and Wellbutrin was started and increased to 300 mg for seasonal depression.  Patient reports that she tolerated all the medication adjustments with no adverse effects.  She reports that she is able to fall asleep without problems now and usually sleeps through the night, but occasionally will wake up during the night.  She still reports some flashback dreams and some nightmares.  She reports that she still feels \"sad\" and still is not wanting to be around other people.    Mental Status Exam:  Depression appears less, from marked to severe down to moderate.  Anxiety appears to be minimal at this time.  Remainder of MSE essentially unchanged from previous visit and unremarkable.    Current Psychiatric Medications:  Seroquel 150 mg at bedtime  Mirtazapine 45 mg at bedtime  Lamotrigine 200 mg at bedtime  25 mg at bedtime  Cymbalta 20 mg a day  Wellbutrin  mg every morning    Lab Tests/Results:  Patient had blood work done January 27, 2021 after being seen for what was diagnosed as nerve radiculopathy.  CMP showed no significant laboratory problems, CBC did show decreased RBC 2.4, decreased hemoglobin 9.3, decreased hematocrit 20.7, and slight increased .  On January 11, 2021 patient had T4 done at 0.96 in normal range, 9/23/2020 hemoglobin A1c was 6.6  We will defer future lab tests for at least another visit or to at which time we will get a lipid panel    Diagnosis:  Bipolar 1 disorder, mixed, current episode depressed  PTSD  Seasonal affective disorder  ADHD, by history  Chronic " pain    Impression/Assessment:  Patient is tolerating medication adjustments and additions with no adverse effects.  She is beginning to get some response to depression and some PTSD symptoms.  We discussed increasing Topamax to 50 mg for flashback dreams and continuing other medications at current dosages for at least another month to assess effectiveness.  Patient has not yet pursued individual psychotherapy, but says she is willing and just forgot.    Plan:  1.  Increase Topamax from 25 to 50 mg at night for flashback dreams  2.  Continue lamotrigine 200 mg at night for bipolar disorder  3.  Continue Wellbutrin  mg every morning for seasonal affective disorder  4.  Continue mirtazapine 45 mg at night to help with depression and sleep disturbance  5.  Continue Cymbalta 20 mg a day up with depression and anxiety  6.  Continue Seroquel 150 mg at night to help with mood stability, depression, and sleep initiation  7.  Pursue initiation of individual psychotherapy  8.  Follow-up appointment with Dr. Wells in 1 month    Total time spent on day of service 54 minutes-7 minutes (9:10 AM through 9:17 AM) review of EMR prior to today's visit, 33 minutes (9:37 AM through 10:10 AM) face-to-face time with patient including counseling on above issues, ordering of medications in the EMR and completing instructions for patient's placement, 14 minutes (10:10 AM through 10:24 AM) documentation in the EMR    Mitchell Wells MD

## 2021-02-01 ENCOUNTER — OFFICE VISIT (OUTPATIENT)
Dept: PEDIATRICS | Facility: OTHER | Age: 61
End: 2021-02-01
Attending: INTERNAL MEDICINE
Payer: COMMERCIAL

## 2021-02-01 VITALS
DIASTOLIC BLOOD PRESSURE: 84 MMHG | SYSTOLIC BLOOD PRESSURE: 132 MMHG | RESPIRATION RATE: 18 BRPM | OXYGEN SATURATION: 90 % | HEART RATE: 93 BPM | TEMPERATURE: 98.1 F | WEIGHT: 140 LBS | BODY MASS INDEX: 21.93 KG/M2

## 2021-02-01 DIAGNOSIS — Z12.31 ENCOUNTER FOR SCREENING MAMMOGRAM FOR BREAST CANCER: ICD-10-CM

## 2021-02-01 DIAGNOSIS — Z12.11 SCREEN FOR COLON CANCER: ICD-10-CM

## 2021-02-01 DIAGNOSIS — G56.31 RADIAL NERVE PALSY, RIGHT: Primary | ICD-10-CM

## 2021-02-01 DIAGNOSIS — Z80.0 FAMILY HISTORY OF COLON CANCER: ICD-10-CM

## 2021-02-01 DIAGNOSIS — Z80.3 FAMILY HISTORY OF MALIGNANT NEOPLASM OF BREAST: ICD-10-CM

## 2021-02-01 PROCEDURE — G0463 HOSPITAL OUTPT CLINIC VISIT: HCPCS

## 2021-02-01 PROCEDURE — 99214 OFFICE O/P EST MOD 30 MIN: CPT | Performed by: INTERNAL MEDICINE

## 2021-02-01 ASSESSMENT — PAIN SCALES - GENERAL: PAINLEVEL: SEVERE PAIN (6)

## 2021-02-01 NOTE — PROGRESS NOTES
Subjective   Funmilayo Stratton is a 60 year old female who presents for med check, follow back up.  She recently went to the emergency room on January 27 after having a problem with her right wrist.  She was evaluated for stroke which was not present and diagnosed with radial palsy.  She was given a splint and sent back to the nursing home.  Her hand has gotten a little puffy.  She is having a difficult time extending her fingers.  She is working very hard on her therapy.  She wants to get out of the wheelchair.  She really would like to get rid of the narcotics.  She is trying to take better care of herself.    Objective   Vitals: /84 (BP Location: Left arm, Patient Position: Sitting, Cuff Size: Adult Regular)   Pulse 93   Temp 98.1  F (36.7  C) (Tympanic)   Resp 18   Wt 63.5 kg (140 lb)   LMP 06/26/2008 (LMP Unknown)   SpO2 90%   Breastfeeding No   BMI 21.93 kg/m      Neuro: Unable to extend the right hand at the wrist.  Normal  strength.  Cardiovascular: Edema of the fingers is present.    Review and Analysis of Data   I personally reviewed the following:  External notes: No  Results: Yes  Use of an independent historian: No  Independent review of a test performed by another physician: No  Discussion of management with another physician: No  Moderate risk of morbidity from additional diagnostic testing and/or treatment.    Assessment & Plan   1. Encounter for screening mammogram for breast cancer  See below    2. Screen for colon cancer  Due for colonoscopy    3. Radial nerve palsy, right  We had a lengthy discussion today with regard to radial palsy.  Her splint and wrappings were removed.  A more easily removable wrist splint was applied for comfort.  I would like her to see occupational therapy to continue working on her strength.  - Wrist/Arm/Hand Supplies Order for DME - ONLY FOR DME    4. Family history of colon cancer  - GASTROENTEROLOGY ADULT REF PROCEDURE ONLY; Future    5. Family history  of malignant neoplasm of breast  - MA Screen Bilateral w/Guille; Future      Patient Instructions    -- Wear your brace for support   -- OT for radial nerve palsy   -- Elevate arm when resting   -- Discontinue oxycodone   -- Schedule colonoscopy   -- Schedule mammogram   -- Follow-up with Dr. Brown as planned   -- Follow-up with Dr. Castañeda in about a month      Return in about 1 month (around 3/1/2021) for med management.    Signed, Abiel Castañeda MD, FAAP, FACP  Internal Medicine & Pediatrics

## 2021-02-01 NOTE — NURSING NOTE
"Chief Complaint   Patient presents with     Recheck Medication     Patient presents for a medication recheck follow up    Initial /84 (BP Location: Left arm, Patient Position: Sitting, Cuff Size: Adult Regular)   Pulse 93   Temp 98.1  F (36.7  C) (Tympanic)   Resp 18   Wt 63.5 kg (140 lb)   LMP 06/26/2008 (LMP Unknown)   SpO2 90%   Breastfeeding No   BMI 21.93 kg/m   Estimated body mass index is 21.93 kg/m  as calculated from the following:    Height as of 1/27/21: 1.702 m (5' 7\").    Weight as of this encounter: 63.5 kg (140 lb).  Medication Reconciliation: complete    Keyonna Ambrosio MA  "

## 2021-02-01 NOTE — PATIENT INSTRUCTIONS
-- Wear your brace for support   -- OT for radial nerve palsy   -- Elevate arm when resting   -- Discontinue oxycodone   -- Schedule colonoscopy   -- Schedule mammogram   -- Follow-up with Dr. Brown as planned   -- Follow-up with Dr. Castañeda in about a month

## 2021-02-02 ENCOUNTER — TELEPHONE (OUTPATIENT)
Dept: SURGERY | Facility: OTHER | Age: 61
End: 2021-02-02

## 2021-02-02 DIAGNOSIS — Z01.818 PRE-OP TESTING: Primary | ICD-10-CM

## 2021-02-02 DIAGNOSIS — Z12.11 SCREENING FOR COLON CANCER: ICD-10-CM

## 2021-02-02 NOTE — TELEPHONE ENCOUNTER
Screening Questions for the Scheduling of Screening Colonoscopies   (If Colonoscopy is diagnostic, Provider should review the chart before scheduling.)  Are you younger than 50 or older than 80?   NO   Do you take aspirin or fish oil?  NO  (if yes, tell patient to stop 1 week prior to Colonoscopy)  Do you take warfarin (Coumadin), clopidogrel (Plavix), apixaban (Eliquis), dabigatram (Pradaxa), rivaroxaban (Xarelto) or any blood thinner? YES - XARELTO   Do you use oxygen at home?  NO   Do you have kidney disease? NO   Are you on dialysis? NO   Have you had a stroke or heart attack in the last year? NO  Have you had a stent in your heart or any blood vessel in the last year? NO   Have you had a transplant of any organ? NO   Have you had a colonoscopy or upper endoscopy (EGD) before? NO         When?    Date of scheduled Colonoscopy. 03/23/2021  Provider [facility]   Pharm[facility]

## 2021-02-02 NOTE — TELEPHONE ENCOUNTER
Patient scheduled with you on 03/23 for a colonoscopy ,   She is on Xarelto and need to know when she will need to stop before procedure.   Please advise.   Thank you.Marsha Baeza on 2/2/2021 at 1:11 PM

## 2021-02-04 ENCOUNTER — HOSPITAL ENCOUNTER (OUTPATIENT)
Facility: OTHER | Age: 61
End: 2021-02-04
Attending: SURGERY | Admitting: SURGERY
Payer: COMMERCIAL

## 2021-02-04 ENCOUNTER — VIRTUAL VISIT (OUTPATIENT)
Dept: EDUCATION SERVICES | Facility: OTHER | Age: 61
End: 2021-02-04
Attending: INTERNAL MEDICINE
Payer: COMMERCIAL

## 2021-02-04 DIAGNOSIS — Z79.4 CONTROLLED TYPE 2 DIABETES MELLITUS WITH COMPLICATION, WITH LONG-TERM CURRENT USE OF INSULIN (H): Primary | ICD-10-CM

## 2021-02-04 DIAGNOSIS — E11.8 CONTROLLED TYPE 2 DIABETES MELLITUS WITH COMPLICATION, WITH LONG-TERM CURRENT USE OF INSULIN (H): Primary | ICD-10-CM

## 2021-02-04 NOTE — PROGRESS NOTES
"Patient states she is eating lunch now and will be shopping after lunch.  Patient requests to review BG this afternoon.  \"I will call you when I get back from shopping.\"  Contact information given.    Tressa Mora RN, BSN, [facility]  2/4/2021 11:36 AM     No follow up call received.      Tressa Mora RN, BSN, [facility]  2/4/2021 5:17 PM       "

## 2021-02-05 ENCOUNTER — RESULTS ONLY (OUTPATIENT)
Dept: LAB | Age: 61
End: 2021-02-05

## 2021-02-05 ENCOUNTER — MEDICAL CORRESPONDENCE (OUTPATIENT)
Dept: HEALTH INFORMATION MANAGEMENT | Facility: OTHER | Age: 61
End: 2021-02-05

## 2021-02-06 ENCOUNTER — NURSE TRIAGE (OUTPATIENT)
Dept: NURSING | Facility: CLINIC | Age: 61
End: 2021-02-06

## 2021-02-07 NOTE — TELEPHONE ENCOUNTER
RN reports that they received a positive C-Diff for patient and requesting new orders.     Informed care staff on how to page MD for [place].     Will have MD on call paged.  No further action needed.     Cally Vogel, RN/[facility]      Additional Information    Negative: [1] Caller is not with the adult (patient) AND [2] reporting urgent symptoms    Negative: Lab result questions    Negative: Medication questions    Negative: Caller can't be reached by phone    Negative: Caller has already spoken to PCP or another triager    Negative: RN needs further essential information from caller in order to complete triage    Negative: Requesting regular office appointment    Negative: [1] Caller requesting NON-URGENT health information AND [2] PCP's office is the best resource    Health Information question, no triage required and triager able to answer question    Protocols used: INFORMATION ONLY CALL-A-

## 2021-02-08 LAB
C DIFF TOX B STL QL: POSITIVE
SPECIMEN SOURCE: ABNORMAL

## 2021-02-08 RX ORDER — BISACODYL 5 MG/1
TABLET, DELAYED RELEASE ORAL
Qty: 2 TABLET | Refills: 0 | Status: SHIPPED | OUTPATIENT
Start: 2021-02-08

## 2021-02-08 RX ORDER — POLYETHYLENE GLYCOL 3350 17 G/17G
POWDER, FOR SOLUTION ORAL
Qty: 510 G | Refills: 0 | Status: SHIPPED | OUTPATIENT
Start: 2021-02-08

## 2021-02-15 ENCOUNTER — OFFICE VISIT (OUTPATIENT)
Dept: PODIATRY | Facility: OTHER | Age: 61
End: 2021-02-15
Attending: PODIATRIST
Payer: COMMERCIAL

## 2021-02-15 VITALS
BODY MASS INDEX: 21.93 KG/M2 | TEMPERATURE: 98.1 F | HEART RATE: 88 BPM | DIASTOLIC BLOOD PRESSURE: 76 MMHG | OXYGEN SATURATION: 100 % | WEIGHT: 140 LBS | SYSTOLIC BLOOD PRESSURE: 120 MMHG

## 2021-02-15 DIAGNOSIS — F17.210 CIGARETTE NICOTINE DEPENDENCE WITHOUT COMPLICATION: ICD-10-CM

## 2021-02-15 DIAGNOSIS — I70.234 ATHEROSCLEROSIS OF NATIVE ARTERY OF RIGHT LOWER EXTREMITY WITH ULCERATION OF MIDFOOT (H): ICD-10-CM

## 2021-02-15 DIAGNOSIS — E11.621 DIABETIC ULCER OF RIGHT MIDFOOT ASSOCIATED WITH TYPE 2 DIABETES MELLITUS, WITH FAT LAYER EXPOSED (H): Primary | ICD-10-CM

## 2021-02-15 DIAGNOSIS — M79.671 RIGHT FOOT PAIN: ICD-10-CM

## 2021-02-15 DIAGNOSIS — E11.9 DIABETES MELLITUS TYPE 2, INSULIN DEPENDENT (H): ICD-10-CM

## 2021-02-15 DIAGNOSIS — L97.412 DIABETIC ULCER OF RIGHT MIDFOOT ASSOCIATED WITH TYPE 2 DIABETES MELLITUS, WITH FAT LAYER EXPOSED (H): Primary | ICD-10-CM

## 2021-02-15 DIAGNOSIS — Z71.6 TOBACCO ABUSE COUNSELING: ICD-10-CM

## 2021-02-15 DIAGNOSIS — Z79.4 DIABETES MELLITUS TYPE 2, INSULIN DEPENDENT (H): ICD-10-CM

## 2021-02-15 DIAGNOSIS — I87.323 CHRONIC VENOUS HYPERTENSION (IDIOPATHIC) WITH INFLAMMATION OF BILATERAL LOWER EXTREMITY: ICD-10-CM

## 2021-02-15 PROCEDURE — 11042 DBRDMT SUBQ TIS 1ST 20SQCM/<: CPT | Performed by: PODIATRIST

## 2021-02-15 PROCEDURE — G0463 HOSPITAL OUTPT CLINIC VISIT: HCPCS | Mod: 25 | Performed by: PODIATRIST

## 2021-02-15 PROCEDURE — 99213 OFFICE O/P EST LOW 20 MIN: CPT | Mod: 25 | Performed by: PODIATRIST

## 2021-02-15 ASSESSMENT — PAIN SCALES - GENERAL: PAINLEVEL: MILD PAIN (2)

## 2021-02-15 NOTE — PROGRESS NOTES
Chief complaint: Patient presents with:  RECHECK      History of Present Illness: This 60 year old IDDM II female is seen for follow-up management of bilateral foot ulcerations.    She says she is no longer taking Oxycodone at night for pain. Her pain is well controlled for her foot and she even rarely takes Tylenol for pain.    She is on antibiotic for C-diff and she was told it was caught in time.    She is also taking Gabapentin which is decreasing her nerve pain.    She also has a nerve palsy because she cannot move her RIGHT hand or wrist, but she is working with physical therapy to regain her strength in her foot.    Her caretakers are changing her dressings every other day with oil emulsion gauze, gauze and tape to the RIGHT dorsal foot.    Patient says she also anticipates she will be going home within the next couple of weeks. Her daughter is her caretaker.     No further pedal complaints today.     Patient is no longer smoking -- she quit a couple weeks ago when the temperatures dropped.    Last HbA1C was 6.6% on 09/23/2020.        /76   Pulse 88   Temp 98.1  F (36.7  C) (Tympanic)   Wt 63.5 kg (140 lb)   LMP 06/26/2008 (LMP Unknown)   SpO2 100%   BMI 21.93 kg/m      Patient Active Problem List   Diagnosis     Essential hypertension     Hypothyroidism     Episodic mood disorder (H)     Pure hypercholesterolemia     Tobacco use disorder     Esophageal reflux     Allergic rhinitis     Vitamin D deficiency     Controlled type 2 diabetes mellitus with complication, with long-term current use of insulin (H)     HYPERLIPIDEMIA LDL GOAL <100     S/P gastric bypass     Malnutrition, unspecified type (H)     Venous stasis of both lower extremities     Diabetic ulcer of left heel associated with type 2 diabetes mellitus, unspecified ulcer stage (H)     Radial nerve palsy, right       History reviewed. No pertinent surgical history.    Current Outpatient Medications   Medication     acetaminophen  (TYLENOL) 500 MG tablet     albuterol (PROAIR HFA/PROVENTIL HFA/VENTOLIN HFA) 108 (90 Base) MCG/ACT inhaler     amoxicillin-clavulanate (AUGMENTIN) 875-125 MG tablet     aspirin 81 MG EC tablet     BD U/F III SHORT PEN NEEDLE 31G X 8 MM MISC     bisacodyl (DULCOLAX) 5 MG EC tablet     buPROPion (WELLBUTRIN XL) 150 MG 24 hr tablet     buPROPion (WELLBUTRIN XL) 300 MG 24 hr tablet     Calcium Carb-Cholecalciferol 500-10 MG-MCG CHEW     calcium carbonate-vitamin D (OS-BEST) 500-400 MG-UNIT tablet     Continuous Blood Gluc Sensor (DEXCOM G6 SENSOR) MISC     Continuous Blood Gluc Transmit (DEXCOM G6 TRANSMITTER) MISC     DULoxetine (CYMBALTA) 20 MG capsule     ferrous sulfate (FEROSUL) 325 (65 Fe) MG tablet     fish oil-omega-3 fatty acids 1000 MG capsule     fluticasone (FLONASE) 50 MCG/ACT nasal spray     furosemide (LASIX) 20 MG tablet     furosemide (LASIX) 40 MG tablet     gabapentin (NEURONTIN) 100 MG capsule     GLUCAGON EMERGENCY 1 MG kit     insulin aspart (NOVOLOG PEN) 100 UNIT/ML pen     insulin glargine (LANTUS PEN) 100 UNIT/ML pen     LACTOBACILLUS PO     lamoTRIgine (LAMICTAL) 100 MG tablet     lamoTRIgine (LAMICTAL) 200 MG tablet     lamoTRIgine (LAMICTAL) 200 MG tablet     levothyroxine (SYNTHROID/LEVOTHROID) 125 MCG tablet     lisinopril (ZESTRIL) 10 MG tablet     lisinopril (ZESTRIL) 20 MG tablet     mirtazapine (REMERON) 45 MG tablet     mirtazapine (REMERON) 45 MG tablet     MULTIVITAMIN/IRON OR TABS     naltrexone (DEPADE/REVIA) 50 MG tablet     omeprazole (PRILOSEC) 40 MG DR capsule     ondansetron (ZOFRAN) 4 MG tablet     ONE TOUCH ULTRA BONUS PACK STRP   VI     ONETOUCH ULTRASOFT LANCETS MISC     polyethylene glycol (MIRALAX) 17 GM/Dose powder     potassium chloride ER (KLOR-CON M) 20 MEQ CR tablet     QUEtiapine (SEROQUEL) 100 MG tablet     QUEtiapine (SEROQUEL) 50 MG tablet     rivaroxaban ANTICOAGULANT (XARELTO ANTICOAGULANT) 10 MG TABS tablet     rivaroxaban ANTICOAGULANT (XARELTO  ANTICOAGULANT) 20 MG TABS tablet     Skin Protectants, Misc. (EUCERIN) cream     topiramate (TOPAMAX) 25 MG tablet     topiramate (TOPAMAX) 50 MG tablet     UNABLE TO FIND     valACYclovir (VALTREX) 500 MG tablet     vitamin D3 (CHOLECALCIFEROL) 2000 units tablet     rivaroxaban ANTICOAGULANT (XARELTO) 15 MG TABS tablet     No current facility-administered medications for this visit.           Allergies   Allergen Reactions     Codeine      Seasonal Allergies      Sulfa Drugs Rash     Vicodin [Hydrocodone-Acetaminophen] Rash       Family History   Problem Relation Age of Onset     Hypertension Mother      Diabetes Mother      Breast Cancer Mother      Asthma Father      Diabetes Father      Hypertension Father      Cerebrovascular Disease Father      Circulatory Father      Eye Disorder Father      Colon Cancer Maternal Uncle 50       Social History     Socioeconomic History     Marital status:      Spouse name: None     Number of children: None     Years of education: None     Highest education level: None   Occupational History     None   Social Needs     Financial resource strain: None     Food insecurity     Worry: None     Inability: None     Transportation needs     Medical: None     Non-medical: None   Tobacco Use     Smoking status: Current Every Day Smoker     Packs/day: 0.50     Years: 46.00     Pack years: 23.00     Last attempt to quit: 2008     Years since quittin.3     Smokeless tobacco: Never Used     Tobacco comment: pt denied QP 20   Substance and Sexual Activity     Alcohol use: Not Currently     Comment: 1 liter of whiskey per day, last drank at 1100 3/5/2019     Drug use: No     Sexual activity: Not Currently   Lifestyle     Physical activity     Days per week: None     Minutes per session: None     Stress: None   Relationships     Social connections     Talks on phone: None     Gets together: None     Attends Nondenominational service: None     Active member of club or  organization: None     Attends meetings of clubs or organizations: None     Relationship status: None     Intimate partner violence     Fear of current or ex partner: None     Emotionally abused: None     Physically abused: None     Forced sexual activity: None   Other Topics Concern     Parent/sibling w/ CABG, MI or angioplasty before 65F 55M? Not Asked   Social History Narrative     None       ROS: 10 point ROS neg other than the symptoms noted above in the HPI.  EXAM  Constitutional: healthy, alert and no distress    Psychiatric: mentation appears normal and affect normal/bright    VASCULAR:  -Dorsalis pedis pulse non-palpable RIGHT but weakly palpable on LEFT   ---Not audible on doppler on RIGHT foot on 12/18/2020  ---Monophasic on doppler on LEFT foot on 12/18/2020  -Posterior tibial pulse +1/4 b/l  ---Monophasic on doppler bilaterally on 12/18/2020  -Hair growth Absent to b/l anterior legs and ankles  -Minimal 1+ pitting edema to bilateral foot -- improved from previous visits  NEURO:  -Light touch sensation intact to b/l plantar forefoot  DERM:  -Skin temperature mildly cool to bilateral foot  -Skin shiny, atrophic to bilateral foot  -Mild dependent rubor to bilateral foot  -Toenails normal length but thickened, dystrophic and discolored x 10  -Hyperkeratotic lesion to LEFT plantar 3rd metatarsal head  Wound Location:  RIGHT dorsal foot  02/15/2021  Measurement:  2cm x 2.5cm x 0.1cm to subcutaneous tissue    ---Wound is angled on the foot with the widest width measuring 1.5cm and the longest length measuring 3cm  Drainage:  Moderate serous  Odor:  None  Undermining:  None  Edges:  Fragile  Base:  100% granular   Surrounding Skin: Intact  No severe erythema, no ascending erythema, no calor, no purulence, no malodor, no other SOI.     01/15/2021  Measurement:  2.3cm x 5cm x 0.1cm to subcutaneous tissue with newly epithelialized skin to the perimeter of the entire wound and a skin island  the most  lateral aspect of the wound (approximately 0.4cm x 0.4cm) from the medial aspect of the wound (skin island estimated to measure 0.3cm)  Drainage:  Moderate serous  Odor:  None  Undermining:  None  Edges:  Fragile  Base:  Marbled 90% granular, 10% fibrotic  Surrounding Skin: Intact  No severe erythema, no ascending erythema, no calor, no purulence, no malodor, no other SOI.     12/18/2020:  2.4cm x 5.8cm x 0.1cm to subcutaneous tissue  12/18/2020:  3cm x 5.4cm x 0.1cm to subcutaneous tissue   ----------------------------------------------  Wound Location:  LEFT medial hallux IPJ  02/15/2021: No further open wound or drainage noted    01/15/2021: 0.2cm x 0.3cm with a 100% well-adhered scab with no drainage    12/22/2020: 0.2cm x 0.3cm with a 100% well-adhered scab with no drainage    12/18/2020  Measurement:  0.2cm x 0.3cm x 0.1cm to sub tissue  Drainage:  Moderate serous  Odor:  None  Undermining:  None  Edges:  Intact  Base:  100% fibrotic  Surrounding Skin: Intact  No severe erythema, no ascending erythema, no calor, no purulence, no malodor, no other SOI.   ----------------------------------------------  Wound Location:  LEFT plantar 4th metatarsal head  02/15/2021: HEALED     01/15/2021: HEALED    12/22/2020:  0.2cm x 0.2cm with a 100% well-adhered scab with no drainage    12/18/2020  Measurement:  0.2cm x 0.2cm x 0.2cm to subcutaneous tissue   Drainage:  Moderate serous  Odor:  None  Undermining:  None  Edges:  Intact  Base:  100% viable post debridement of moderate overlying hyperkeratotic lesion   Surrounding Skin: Intact  No severe erythema, no ascending erythema, no calor, no purulence, no malodor, no other SOI.   ----------------------------------------------  Wound Location:  LEFT posterior heel    02/15/2021:  No further open wound or drainage noted    01/15/2021:  0.4cm x 0.4cm with a 100% well-adhered scab with no drainage    12/22/2020:  0.5cm x 0.3cm with a 100% well-adhered scab with no  drainage    12/18/2020:  0.5cm x 0.3cm x 0.1cm to subcutaneous tissue    MSK:  -Minimal-to-no pain on palpation or with debridement to RIGHT dorsal foot ulceration  -Muscle strength of ankles +5/5 for dorsiflexion, plantarflexion, ABDUction and ADDuction b/l    MACHELLE 12/22/2020  FINDINGS:   The right dorsal pedis and posterior tibial artery are noncompressible. Brachial pressures are 138.   The left ankle-brachial index is 1.5.   IMPRESSION: Above findings are compatible with atherosclerotic disease with elevated measurements on the left suggesting non-compressible disease and noncompressible disease demonstrated on the right.  KEELEY BURNS MD  ============================================================    ASSESSMENT:  (E11.621,  L97.412) Diabetic ulcer of right midfoot associated with type 2 diabetes mellitus, with fat layer exposed (H)  (primary encounter diagnosis)    (M79.671) Right foot pain    (E11.9,  Z79.4) Diabetes mellitus type 2, insulin dependent (H)    (I87.323) Chronic venous hypertension (idiopathic) with inflammation of bilateral lower extremity    (I70.234) Atherosclerosis of native artery of right lower extremity with ulceration of midfoot (H)    (Z71.6) Tobacco abuse counseling    (F17.210) Cigarette nicotine dependence without complication      PLAN:  -Patient evaluated and examined. Treatment options discussed with no educational barriers noted.  -Patient's only remaining wound is the RIGHT dorsal foot. The wound has continued to moderately improve with healthy tissue. There is also no further pain with palpation or debridement today.      -Vascular: Patient had a video conference with Dr. Alisson Brown MD, through [facility].  -Discussed patient's falsely elevated MACHELLE readings from 12/21/2020 with patient on 12/22/2021. The vascular surgeon through [facility], Dr. Laquita Yates, was contacted on 12/22/2020 regarding patient's MACHELLE results. She had recommended a CTA to determine blood flow  status. If there was a blockage, patient was told she may need a vascular intervention . If there was not a blockage, patient may need to see Rheumatology to address medications for her micro vascular disease.   ---Patient says she ended up having an appointment with vascular through [facility].  ---Patient was told she had blood clots in both legs for which she is now on a blood thinner. She was also told to wrap the legs day and night with ACE bandages from the toes to the thighs every day.    -Excisional debridement of wound on RIGHT dorsal foot with a sharp ring curette to subcutaneous tissue (<20 cm squared)  ---Applied Mepilex Ag, one piece of gauze and Medipore tape  ---Dressing to be changed daily, but may be changed every two days if there is minimal drainage    -Callus pared x 1 to the LEFT plantar 3rd metatarsal head without incident  ---Patient reminded that the callus will likely return due to the underlying, prominent bone causing the callus while the patient is walking.  ---Mild pinpoint bleeding post paring -- applied triple antibiotic cream, gauze and Medipore tape. Patient to continue with this dressing until there is no further drainage and the wound is healed.    -Patient was instructed to look for SOI (redness, swelling, pain, purulence, fever, chills, nausea, vomiting) and to return to podiatry or the emergency department immediately if there are any SOI.     -Tobacco cessation -- The Quit Plan referral was offered and the patient is not interested in the referral today. =She says she has not smoked for the past week and a half because it has been too cold to smoke outside.      -Patient in agreement with the above treatment plan and all of patient's questions were answered.      RTC four weeks to evaluate RIGHT dorsal foot ulcer      Reena Kelly DPM

## 2021-02-15 NOTE — PATIENT INSTRUCTIONS
Orders for patient's RIGHT foot wound (orders placed on 02/15/2021):    Please change patient's dressing on the top of her RIGHT foot once a day with the following:  -Cut a small square of Mepilex Ag (cut to about the size of the wound). Peel off the sheet covering the backing of the sticky layer and apply the sticky side directly against the wound bed. Cover with a small piece of gauze and tape.  -If the wound is not draining a lot, dressing may be changed once every other day.  -Please change for an additional dressing whenever the patient showers.    Please apply an antibiotic cream, gauze and a bandage to the callus on the bottom of the LEFT foot until there is no further drainage or bleeding. If there is no further drainage or bleeding (this is expected to be healed by 02/16/2021 or 02/17/2021), then the dressing may be discontinued.    -Please check both feet daily for signs of infection (redness, swelling, pain, purulence, fever, chills, nausea, vomiting) and have the patient go to the emergency department immediately if there are any signs of infection.     -Please call the podiatry office with any questions or concerns: [phone]

## 2021-02-15 NOTE — NURSING NOTE
"Chief Complaint   Patient presents with     RECHECK       Initial /76   Pulse 88   Temp 98.1  F (36.7  C) (Tympanic)   Wt 63.5 kg (140 lb)   LMP 06/26/2008 (LMP Unknown)   SpO2 100%   BMI 21.93 kg/m   Estimated body mass index is 21.93 kg/m  as calculated from the following:    Height as of 1/27/21: 1.702 m (5' 7\").    Weight as of this encounter: 63.5 kg (140 lb).  Medication Reconciliation: complete  Graciela Hodge LPN    "

## 2021-02-23 ENCOUNTER — TELEPHONE (OUTPATIENT)
Dept: PEDIATRICS | Facility: OTHER | Age: 61
End: 2021-02-23

## 2021-02-23 NOTE — TELEPHONE ENCOUNTER
Patient is discharging today and her other physician is out of the clinic. Wondering if  could sign the discharge papers.

## 2021-04-10 ENCOUNTER — HEALTH MAINTENANCE LETTER (OUTPATIENT)
Age: 61
End: 2021-04-10

## 2021-05-03 ENCOUNTER — NURSE TRIAGE (OUTPATIENT)
Dept: PEDIATRICS | Facility: OTHER | Age: 61
End: 2021-05-03

## 2021-05-03 ENCOUNTER — NURSING HOME VISIT (OUTPATIENT)
Dept: GERIATRICS | Facility: OTHER | Age: 61
End: 2021-05-03
Payer: COMMERCIAL

## 2021-05-03 VITALS
DIASTOLIC BLOOD PRESSURE: 65 MMHG | BODY MASS INDEX: 19.11 KG/M2 | WEIGHT: 122 LBS | RESPIRATION RATE: 20 BRPM | HEART RATE: 100 BPM | TEMPERATURE: 97 F | OXYGEN SATURATION: 100 % | SYSTOLIC BLOOD PRESSURE: 160 MMHG

## 2021-05-03 DIAGNOSIS — G89.29 CHRONIC BILATERAL LOW BACK PAIN WITHOUT SCIATICA: ICD-10-CM

## 2021-05-03 DIAGNOSIS — F41.8 DEPRESSION WITH ANXIETY: ICD-10-CM

## 2021-05-03 DIAGNOSIS — E10.69 TYPE 1 DIABETES MELLITUS WITH OTHER SPECIFIED COMPLICATION (H): ICD-10-CM

## 2021-05-03 DIAGNOSIS — M62.81 GENERALIZED MUSCLE WEAKNESS: ICD-10-CM

## 2021-05-03 DIAGNOSIS — M54.50 CHRONIC BILATERAL LOW BACK PAIN WITHOUT SCIATICA: ICD-10-CM

## 2021-05-03 DIAGNOSIS — R29.6 FALLS FREQUENTLY: Primary | ICD-10-CM

## 2021-05-03 DIAGNOSIS — F31.70 BIPOLAR AFFECTIVE DISORDER IN REMISSION (H): ICD-10-CM

## 2021-05-03 DIAGNOSIS — E10.621 DIABETIC ULCER OF LEFT FOOT ASSOCIATED WITH TYPE 1 DIABETES MELLITUS, UNSPECIFIED PART OF FOOT, UNSPECIFIED ULCER STAGE (H): ICD-10-CM

## 2021-05-03 DIAGNOSIS — I82.513 CHRONIC DEEP VEIN THROMBOSIS (DVT) OF FEMORAL VEIN OF BOTH LOWER EXTREMITIES (H): ICD-10-CM

## 2021-05-03 DIAGNOSIS — E03.9 HYPOTHYROIDISM, UNSPECIFIED TYPE: ICD-10-CM

## 2021-05-03 DIAGNOSIS — L97.529 DIABETIC ULCER OF LEFT FOOT ASSOCIATED WITH TYPE 1 DIABETES MELLITUS, UNSPECIFIED PART OF FOOT, UNSPECIFIED ULCER STAGE (H): ICD-10-CM

## 2021-05-03 PROCEDURE — 99309 SBSQ NF CARE MODERATE MDM 30: CPT | Performed by: NURSE PRACTITIONER

## 2021-05-03 ASSESSMENT — ENCOUNTER SYMPTOMS
WOUND: 1
NUMBNESS: 1
ABDOMINAL PAIN: 0
PARESTHESIAS: 1
DIFFICULTY URINATING: 0
WHEEZING: 0
SHORTNESS OF BREATH: 0
APPETITE CHANGE: 0
DIARRHEA: 0
HEMATURIA: 0
DYSPHORIC MOOD: 0
POLYDIPSIA: 0
ANAL BLEEDING: 0
FATIGUE: 0
CHEST TIGHTNESS: 0
CONSTIPATION: 0
POLYPHAGIA: 0
TROUBLE SWALLOWING: 0
NAUSEA: 0
UNEXPECTED WEIGHT CHANGE: 0
PALPITATIONS: 0
WEAKNESS: 1
NERVOUS/ANXIOUS: 0

## 2021-05-03 NOTE — TELEPHONE ENCOUNTER
Calling regarding pt's insulin sliding scale, pt's blood sugar is currently above the scale so wondering if pt should receive more insulin.

## 2021-05-03 NOTE — PROGRESS NOTES
Subjective:  She is evaluated today for initial nurse practitioner review.  Patient was hospitalized at [facility] from April 27, 2021 through April 28, 2021.  She had had frequent falls at her home.  Her daughter was her PCA.  She was feeling that she was more weak.  At the time that she had her last fall she had been ambulating without the use of her walker.  She has history of chronic pelvic fractures with no recent fracture identified with hospitalization.  She has depression with anxiety which is controlled with medication.  She has type 1 diabetes with peripheral angiopathy.  She receives a long-acting and sliding scale insulin.  Since being at [facility] nursing facility her sugars range from 145-369.  She also has chronic DVT and is anticoagulated.  She has hypothyroidism and is on replacement therapy.  It is unclear but it appears as though her Synthroid dose was increased from 125 mcg to 150 mcg while hospitalized with recommendation to have follow-up TSH in 2-3 weeks.  She has chronic depression with anxiety and bipolar disorder.  She is on multiple psychotropics.  She has history of marijuana use with urine drug screen positive for THC at recent hospitalization.    She was referred to skilled nursing facility at the time of hospital discharge for generalized weakness and recurrent falls.  She has orders to rehab with PT and OT.  Her daughter has been her PCA but she reports that she feels as though her daughter is getting burned out.  She has been a resident at the [facility] nursing facility within the past year.  She has multiple chronic diabetic ulcers of the left foot.  She was seen by wound care during hospitalization and orders were placed at recent hospital stay. Unable to find orders on hospital discharge summary.  Nursing staff unsure of treatment plan.    Patient Active Problem List   Diagnosis     Essential hypertension     Hypothyroidism     Episodic mood disorder (H)     Pure  hypercholesterolemia     Tobacco use disorder     Esophageal reflux     Allergic rhinitis     Vitamin D deficiency     Controlled type 2 diabetes mellitus with complication, with long-term current use of insulin (H)     HYPERLIPIDEMIA LDL GOAL <100     S/P gastric bypass     Malnutrition, unspecified type (H)     Venous stasis of both lower extremities     Diabetic ulcer of left heel associated with type 2 diabetes mellitus, unspecified ulcer stage (H)     Radial nerve palsy, right     Past Medical History:   Diagnosis Date     DIABETES MELLITUS TYPE II-UNCOMPL 2006     HYPERTENSION NOS 2006     HYPOTHYROIDISM NOS 2006     Vitamin D deficiencies 2008     No past surgical history on file.  Social History     Socioeconomic History     Marital status:      Spouse name: Not on file     Number of children: Not on file     Years of education: Not on file     Highest education level: Not on file   Occupational History     Not on file   Social Needs     Financial resource strain: Not on file     Food insecurity     Worry: Not on file     Inability: Not on file     Transportation needs     Medical: Not on file     Non-medical: Not on file   Tobacco Use     Smoking status: Current Every Day Smoker     Packs/day: 0.50     Years: 46.00     Pack years: 23.00     Last attempt to quit: 2008     Years since quittin.7     Smokeless tobacco: Never Used     Tobacco comment: down to 5 cigs/ day   Substance and Sexual Activity     Alcohol use: Not Currently     Comment: 1 liter of whiskey per day, last drank at 1100 3/5/2019     Drug use: No     Sexual activity: Not Currently   Lifestyle     Physical activity     Days per week: Not on file     Minutes per session: Not on file     Stress: Not on file   Relationships     Social connections     Talks on phone: Not on file     Gets together: Not on file     Attends Voodoo service: Not on file     Active member of club or organization: Not on file      Attends meetings of clubs or organizations: Not on file     Relationship status: Not on file     Intimate partner violence     Fear of current or ex partner: Not on file     Emotionally abused: Not on file     Physically abused: Not on file     Forced sexual activity: Not on file   Other Topics Concern     Parent/sibling w/ CABG, MI or angioplasty before 65F 55M? Not Asked   Social History Narrative     Not on file     Family History   Problem Relation Age of Onset     Hypertension Mother      Diabetes Mother      Breast Cancer Mother      Asthma Father      Diabetes Father      Hypertension Father      Cerebrovascular Disease Father      Circulatory Father      Eye Disorder Father      Colon Cancer Maternal Uncle 50     Codeine, Seasonal allergies, Sulfa drugs, and Vicodin [hydrocodone-acetaminophen]    Skilled Nursing Facility Medication Record reviewed    End of Life Planning:   DNR/DNI selective treatment    Review of Systems:  Review of Systems   Constitutional: Negative for appetite change, fatigue and unexpected weight change.   HENT: Negative for trouble swallowing.    Respiratory: Negative for chest tightness, shortness of breath and wheezing.    Cardiovascular: Negative for chest pain, palpitations and peripheral edema.   Gastrointestinal: Negative for abdominal pain, anal bleeding, constipation, diarrhea and nausea.   Endocrine: Negative for polydipsia, polyphagia and polyuria.   Genitourinary: Negative for difficulty urinating and hematuria.   Musculoskeletal: Positive for gait problem.        Chronic neuropathy of UE and LE with weakness and dysmobility of right wrist, chronic   Skin: Positive for wound.   Neurological: Positive for weakness, numbness and paresthesias. Negative for syncope.   Psychiatric/Behavioral: Negative for dysphoric mood. The patient is not nervous/anxious.        Objective:   BP (!) 160/65   Pulse 100   Temp 97  F (36.1  C)   Resp 20   Wt 55.3 kg (122 lb)   LMP 06/26/2008  (LMP Unknown)   SpO2 100%   BMI 19.11 kg/m    Physical Exam  Pleasant female in no acute distress.  Affect normal.  Alert and pleasant.  Skin color pink.  Sclera nonicteric.  Cesar membranes moist.  Neck supple and without adenopathy.  Lung fields clear to auscultation throughout.  Cardiovascular regular rate and rhythm with no S3 auscultated.  Abdomen soft and without masses, tenderness and organomegaly.  Bilateral extremities with trace edema.  She has diabetic ulcers along the dorsal surface of the left foot and left heel.  These are both open to cutaneous tissue and moderate amount of serosanguineous nonodorous drainage.  No surrounding erythema warmth or maceration.  Measurements completed through nursing staff.  He has chronic neuropathy of upper and lower extremities and chronic weakness and disability of right hand and wrist.    Assessment:    ICD-10-CM    1. Falls frequently  R29.6    2. Generalized muscle weakness  M62.81    3. Type 1 diabetes mellitus with other specified complication (H)  E10.69    4. Chronic deep vein thrombosis (DVT) of femoral vein of both lower extremities (H)  I82.513    5. Depression with anxiety  F41.8    6. Bipolar affective disorder in remission (H)  F31.70    7. Chronic bilateral low back pain without sciatica  M54.5     G89.29    8. Diabetic ulcer of left foot associated with type 1 diabetes mellitus, unspecified part of foot, unspecified ulcer stage (H)  E10.621     L97.529    9. Hypothyroidism, unspecified type  E03.9        Plan:   1.  PT and OT for strengthening, improvement of ADLs, etc.  2.  Sugars range from 145-369 on long-acting and sliding scale insulin.  No current hemoglobin A1c on file.  Recommend checking hemoglobin A1c next lab day.  3.  Continue chronic anticoagulation.  Discussed that patient is at risk of bleeding with anticoagulation and fall risk.  4.  Consider management through [place] psychiatry unless patient prefers to follow with outpatient  psychiatry.  She will continue to have ongoing evaluation and treatment plan through her PCP Dr. Jones at [place].  5.  Chronic low back pain currently stable.  Work with PT and OT.  6.  Recommend OPTi foam to be applied to right dorsal foot and right heel ulcer and change every 3 days and as needed.  Follow-up with wounds in 2-3 weeks, sooner with concerns.  7.  Check TSH and free T4 in 3 weeks to follow-up on recent Synthroid adjustment.    RAYMUNDO Turner   5/3/2021  3:18 PM

## 2021-05-04 ENCOUNTER — MEDICAL CORRESPONDENCE (OUTPATIENT)
Dept: HEALTH INFORMATION MANAGEMENT | Facility: OTHER | Age: 61
End: 2021-05-04

## 2021-05-04 ENCOUNTER — TELEPHONE (OUTPATIENT)
Dept: PEDIATRICS | Facility: OTHER | Age: 61
End: 2021-05-04

## 2021-05-04 ENCOUNTER — RESULTS ONLY (OUTPATIENT)
Dept: LAB | Age: 61
End: 2021-05-04

## 2021-05-04 DIAGNOSIS — E11.65 UNCONTROLLED TYPE 2 DIABETES MELLITUS WITH HYPERGLYCEMIA (H): Primary | ICD-10-CM

## 2021-05-04 LAB — HBA1C MFR BLD: 8.2 % (ref 4–6)

## 2021-05-04 NOTE — TELEPHONE ENCOUNTER
-- Give novolog 10 units once   -- Send glucose log to Dr. Castañeda for review    Signed, Abiel Castañeda MD, FAAP, FACP  Internal Medicine & Pediatrics

## 2021-05-04 NOTE — TELEPHONE ENCOUNTER
Notified staff at [facility] of Abiel Castañeda MD orders. They will send her glucose log over today for review  Natalie Gonzales LPN.....5/4/2021 1:14 PM

## 2021-05-04 NOTE — TELEPHONE ENCOUNTER
Spoke with [facility] Figueroa's regarding order below, verbalized understanding of contacting Dr. Jones.  Allie Miller LPN on 5/4/2021 at 3:20 PM

## 2021-05-04 NOTE — TELEPHONE ENCOUNTER
Reviewed glucose log from 4/29-5/4.    Glucoses too high.  It appears she has switched back to Dr. Jones, and has seen Endo.    Contact Dr. Jones.    Signed, Abiel Castañeda MD, FAAP, FACP  Internal Medicine & Pediatrics

## 2021-05-04 NOTE — TELEPHONE ENCOUNTER
Patient's current blood sugar is 515. She only has 4 units to give per her sliding scale (this is what it maxes out at). Patient has a headache, is nauseated, wants to drink a lot and is sleepy.   Please advise.  Natalie Goznales LPN.....5/4/2021 12:45 PM

## 2021-05-05 ENCOUNTER — NURSING HOME VISIT (OUTPATIENT)
Dept: INTERNAL MEDICINE | Facility: OTHER | Age: 61
End: 2021-05-05
Payer: COMMERCIAL

## 2021-05-05 ENCOUNTER — APPOINTMENT (OUTPATIENT)
Dept: GERIATRICS | Facility: OTHER | Age: 61
End: 2021-05-05
Attending: NURSE PRACTITIONER
Payer: COMMERCIAL

## 2021-05-05 VITALS
DIASTOLIC BLOOD PRESSURE: 98 MMHG | BODY MASS INDEX: 19.11 KG/M2 | HEART RATE: 81 BPM | SYSTOLIC BLOOD PRESSURE: 123 MMHG | TEMPERATURE: 98.1 F | WEIGHT: 122 LBS

## 2021-05-05 DIAGNOSIS — L97.429 DIABETIC ULCER OF LEFT HEEL ASSOCIATED WITH TYPE 2 DIABETES MELLITUS, UNSPECIFIED ULCER STAGE (H): ICD-10-CM

## 2021-05-05 DIAGNOSIS — E11.621 DIABETIC ULCER OF LEFT HEEL ASSOCIATED WITH TYPE 2 DIABETES MELLITUS, UNSPECIFIED ULCER STAGE (H): ICD-10-CM

## 2021-05-05 DIAGNOSIS — E11.65 UNCONTROLLED TYPE 2 DIABETES MELLITUS WITH HYPERGLYCEMIA (H): Primary | ICD-10-CM

## 2021-05-05 PROCEDURE — 99308 SBSQ NF CARE LOW MDM 20: CPT | Performed by: NURSE PRACTITIONER

## 2021-05-05 ASSESSMENT — ENCOUNTER SYMPTOMS
POLYDIPSIA: 1
WOUND: 1
UNEXPECTED WEIGHT CHANGE: 0
FATIGUE: 0
DYSURIA: 0

## 2021-05-05 NOTE — PROGRESS NOTES
Subjective:  She is seen today for uncontrolled type 2 diabetes with hyperglycemia.  Prior to admission she tells me that she was working with her physician in [facility] for insulin pump.  Does have a Dexcom unit but does not have the supplies, just the machine.  She would like to begin using Dexcom rather than having lancets used for checking sugars.    At this time she is on Lantus 8 units daily and insulin aspart sliding scale with meals and at bedtime.  Her blood sugars since admission have ranged from 145-518 and over the past 2 days all blood sugars have been higher than 305.  Labs drawn yesterday showed a hemoglobin A1c at 8.2%.  A diabetic diet is ordered but patient has not been following this.  He does have diabetic ulcers of the left foot that were evaluated at previous visit earlier this week.    Patient Active Problem List   Diagnosis     Essential hypertension     Hypothyroidism     Episodic mood disorder (H)     Pure hypercholesterolemia     Tobacco use disorder     Esophageal reflux     Allergic rhinitis     Vitamin D deficiency     Controlled type 2 diabetes mellitus with complication, with long-term current use of insulin (H)     HYPERLIPIDEMIA LDL GOAL <100     S/P gastric bypass     Malnutrition, unspecified type (H)     Venous stasis of both lower extremities     Diabetic ulcer of left heel associated with type 2 diabetes mellitus, unspecified ulcer stage (H)     Radial nerve palsy, right     Uncontrolled type 2 diabetes mellitus with hyperglycemia (H)     Past Medical History:   Diagnosis Date     DIABETES MELLITUS TYPE II-UNCOMPL 6/14/2006     HYPERTENSION NOS 6/14/2006     HYPOTHYROIDISM NOS 6/14/2006     Vitamin D deficiencies 9/21/2008     No past surgical history on file.  Social History     Socioeconomic History     Marital status:      Spouse name: Not on file     Number of children: Not on file     Years of education: Not on file     Highest education level: Not on file    Occupational History     Not on file   Social Needs     Financial resource strain: Not on file     Food insecurity     Worry: Not on file     Inability: Not on file     Transportation needs     Medical: Not on file     Non-medical: Not on file   Tobacco Use     Smoking status: Current Every Day Smoker     Packs/day: 0.50     Years: 46.00     Pack years: 23.00     Last attempt to quit: 2008     Years since quittin.7     Smokeless tobacco: Never Used     Tobacco comment: down to 5 cigs/ day   Substance and Sexual Activity     Alcohol use: Not Currently     Comment: 1 liter of whiskey per day, last drank at 1100 3/5/2019     Drug use: No     Sexual activity: Not Currently   Lifestyle     Physical activity     Days per week: Not on file     Minutes per session: Not on file     Stress: Not on file   Relationships     Social connections     Talks on phone: Not on file     Gets together: Not on file     Attends Nondenominational service: Not on file     Active member of club or organization: Not on file     Attends meetings of clubs or organizations: Not on file     Relationship status: Not on file     Intimate partner violence     Fear of current or ex partner: Not on file     Emotionally abused: Not on file     Physically abused: Not on file     Forced sexual activity: Not on file   Other Topics Concern     Parent/sibling w/ CABG, MI or angioplasty before 65F 55M? Not Asked   Social History Narrative     Not on file     Family History   Problem Relation Age of Onset     Hypertension Mother      Diabetes Mother      Breast Cancer Mother      Asthma Father      Diabetes Father      Hypertension Father      Cerebrovascular Disease Father      Circulatory Father      Eye Disorder Father      Colon Cancer Maternal Uncle 50     Codeine, Seasonal allergies, Sulfa drugs, and Vicodin [hydrocodone-acetaminophen]    Skilled Nursing Facility Medication Record reviewed    End of Life Planning:   DNR/DNI    Review of  Systems:  Review of Systems   Constitutional: Negative for fatigue and unexpected weight change.   Endocrine: Positive for polydipsia and polyuria.   Genitourinary: Negative for dysuria.   Skin: Positive for wound.       Objective:   LMP 06/26/2008 (LMP Unknown)   Physical Exam  Pleasant female lying in bed no acute distress usual state of health.  Answers questions appropriately.  Blood sugar log reviewed and discussed.    Assessment:    ICD-10-CM    1. Uncontrolled type 2 diabetes mellitus with hyperglycemia (H)  E11.65    2. Diabetic ulcer of left heel associated with type 2 diabetes mellitus, unspecified ulcer stage (H)  E11.621     L97.429        Plan:   1.  Had a discussion with patient that I will follow her blood sugars at skilled nursing facility as long as she is here.  I think this is in patient's best interest rather than calling into her physician that is in a different county.  At this time increase Lantus to 12 units daily.  Start insulin aspart 3 units 3 times daily with meals in addition to currently prescribed sliding scale.  Okay to use Dexcom and nursing staff will contact pharmacy for Dexcom supplies.  Once she starts Dexcom system then can discontinue blood sugar checks with fingersticks.  Follow-up in 1 week to review blood sugars and make adjustments if needed.  2.  Discussed the importance of well-controlled diabetes with healing diabetic ulcers and preventing infection.    RAYMUNDO Turner   5/5/2021  3:27 PM

## 2021-05-10 ENCOUNTER — NURSING HOME VISIT (OUTPATIENT)
Dept: INTERNAL MEDICINE | Facility: OTHER | Age: 61
End: 2021-05-10
Payer: COMMERCIAL

## 2021-05-10 VITALS
TEMPERATURE: 97.5 F | SYSTOLIC BLOOD PRESSURE: 135 MMHG | BODY MASS INDEX: 19.73 KG/M2 | WEIGHT: 126 LBS | DIASTOLIC BLOOD PRESSURE: 65 MMHG | HEART RATE: 84 BPM | RESPIRATION RATE: 18 BRPM

## 2021-05-10 DIAGNOSIS — E11.621 DIABETIC ULCER OF LEFT HEEL ASSOCIATED WITH TYPE 2 DIABETES MELLITUS, UNSPECIFIED ULCER STAGE (H): Primary | ICD-10-CM

## 2021-05-10 DIAGNOSIS — I73.9 PAD (PERIPHERAL ARTERY DISEASE) (H): ICD-10-CM

## 2021-05-10 DIAGNOSIS — I87.8 VENOUS STASIS OF BOTH LOWER EXTREMITIES: ICD-10-CM

## 2021-05-10 DIAGNOSIS — L97.429 DIABETIC ULCER OF LEFT HEEL ASSOCIATED WITH TYPE 2 DIABETES MELLITUS, UNSPECIFIED ULCER STAGE (H): Primary | ICD-10-CM

## 2021-05-10 DIAGNOSIS — E11.65 UNCONTROLLED TYPE 2 DIABETES MELLITUS WITH HYPERGLYCEMIA (H): ICD-10-CM

## 2021-05-10 DIAGNOSIS — E46 MALNUTRITION, UNSPECIFIED TYPE (H): ICD-10-CM

## 2021-05-10 PROCEDURE — 99309 SBSQ NF CARE MODERATE MDM 30: CPT | Performed by: NURSE PRACTITIONER

## 2021-05-10 ASSESSMENT — ENCOUNTER SYMPTOMS
VOMITING: 0
CHILLS: 0
DIZZINESS: 0
WOUND: 1
FEVER: 0
DIARRHEA: 0
NAUSEA: 0
PARESTHESIAS: 1
WEAKNESS: 1
POLYPHAGIA: 0
DIAPHORESIS: 0
NUMBNESS: 1
POLYDIPSIA: 0

## 2021-05-10 NOTE — PROGRESS NOTES
Subjective:  Patient is seen today at the request of nursing staff for increased drainage at the wounds on the left foot.  She was seen by this provider on May 3, 2021 and had new orders given for wound care of these ulcers.  It was recommended wounds be dressed with Optifoam and change every 3 days.  She was also then again seen on May 5, 2021 by this provider for poorly controlled type 2 diabetes.  Hemoglobin A1c was increased at 8.2%.  Her sugars were running high therefore Lantus was increased and she was started on aspart insulin 3 units 3 times daily with meals.  Since that visit blood sugars range from 113-375 with 1 outlier on May 6 at 4 PM with a blood sugar of 73.  Asymptomatic.  Otherwise her 4 PM blood sugars since that time have been 119 and 302.  Patient has extremity edema.  This is been secondary to venous stasis but also malnutrition.  While hospitalized on April 28, 2021 albumin was low at 2.3.  She is not on any nutritional supplements.  She did have MACHELLE study last December showing bilateral noncompressible disease with elevated MACHELLE.      Patient Active Problem List   Diagnosis     Essential hypertension     Hypothyroidism     Episodic mood disorder (H)     Pure hypercholesterolemia     Tobacco use disorder     Esophageal reflux     Allergic rhinitis     Vitamin D deficiency     Controlled type 2 diabetes mellitus with complication, with long-term current use of insulin (H)     HYPERLIPIDEMIA LDL GOAL <100     S/P gastric bypass     Malnutrition, unspecified type (H)     Venous stasis of both lower extremities     Diabetic ulcer of left heel associated with type 2 diabetes mellitus, unspecified ulcer stage (H)     Radial nerve palsy, right     Uncontrolled type 2 diabetes mellitus with hyperglycemia (H)     Past Medical History:   Diagnosis Date     DIABETES MELLITUS TYPE II-UNCOMPL 6/14/2006     HYPERTENSION NOS 6/14/2006     HYPOTHYROIDISM NOS 6/14/2006     Vitamin D deficiencies 9/21/2008      No past surgical history on file.  Social History     Socioeconomic History     Marital status:      Spouse name: Not on file     Number of children: Not on file     Years of education: Not on file     Highest education level: Not on file   Occupational History     Not on file   Social Needs     Financial resource strain: Not on file     Food insecurity     Worry: Not on file     Inability: Not on file     Transportation needs     Medical: Not on file     Non-medical: Not on file   Tobacco Use     Smoking status: Current Every Day Smoker     Packs/day: 0.50     Years: 46.00     Pack years: 23.00     Last attempt to quit: 2008     Years since quittin.7     Smokeless tobacco: Never Used     Tobacco comment: down to 5 cigs/ day   Substance and Sexual Activity     Alcohol use: Not Currently     Comment: 1 liter of whiskey per day, last drank at 1100 3/5/2019     Drug use: No     Sexual activity: Not Currently   Lifestyle     Physical activity     Days per week: Not on file     Minutes per session: Not on file     Stress: Not on file   Relationships     Social connections     Talks on phone: Not on file     Gets together: Not on file     Attends Sikh service: Not on file     Active member of club or organization: Not on file     Attends meetings of clubs or organizations: Not on file     Relationship status: Not on file     Intimate partner violence     Fear of current or ex partner: Not on file     Emotionally abused: Not on file     Physically abused: Not on file     Forced sexual activity: Not on file   Other Topics Concern     Parent/sibling w/ CABG, MI or angioplasty before 65F 55M? Not Asked   Social History Narrative     Not on file     Family History   Problem Relation Age of Onset     Hypertension Mother      Diabetes Mother      Breast Cancer Mother      Asthma Father      Diabetes Father      Hypertension Father      Cerebrovascular Disease Father      Circulatory Father      Eye  Disorder Father      Colon Cancer Maternal Uncle 50     Codeine, Seasonal allergies, Sulfa drugs, and Vicodin [hydrocodone-acetaminophen]    Skilled Nursing Facility Medication Record reviewed    End of Life Planning:   DNR/DNI    Review of Systems:  Review of Systems   Constitutional: Negative for chills, diaphoresis and fever.   Gastrointestinal: Negative for diarrhea, nausea and vomiting.   Endocrine: Negative for polydipsia, polyphagia and polyuria.   Musculoskeletal: Positive for gait problem.   Skin: Positive for wound.   Neurological: Positive for weakness, numbness and paresthesias. Negative for dizziness.       Objective:   /65   Pulse 84   Temp 97.5  F (36.4  C)   Resp 18   Wt 57.2 kg (126 lb)   LMP 06/26/2008 (LMP Unknown)   BMI 19.73 kg/m    Physical Exam  Pleasant female usual state of health.  Affect normal.  Alert and pleasant.  Answers questions appropriately at base time.  Left foot has wounds located along the dorsum of foot and at left heel.  Measurements completed through nursing staff.  Wound located over dorsum of foot now has a rim of granulation tissue around the border and less white slough than previous.  There is a small to moderate amount of serosanguineous nonodorous drainage.  She has a new small 0.3 cm in diameter abrasion distal to the dorsal foot wound which appears to be from trauma adhesive from the silicone bandage.  The adhesive was actually over the wound rather than the dressing to being over the wound.  Light pink wound bed and small amount of serosanguineous nonodorous drainage.  Wound located at left heel continues to show improvement and is superficial.  Scant amount of white slough.  Small to moderate amount of serosanguineous drainage.  All wounds are without surrounding erythema warmth and maceration.  Left foot DP PT 2+.  Capillary refill less than 3 seconds.  1+ edema left lower extremity, chronic.    Assessment:    ICD-10-CM    1. Diabetic ulcer of left  heel associated with type 2 diabetes mellitus, unspecified ulcer stage (H)  E11.621     L97.429    2. Uncontrolled type 2 diabetes mellitus with hyperglycemia (H)  E11.65    3. Venous stasis of both lower extremities  I87.8    4. Malnutrition, unspecified type (H)  E46    5. PAD (peripheral artery disease) (H)  I73.9        Plan:   1.  The original wounds at the dorsum of the left foot and the left heel are actually showing improvement.  Recommend cleansing with saline then applying Optifoam and changing every other day.  Recommend that when the dressing is placed over the dorsum of the foot that it also covers the new area so that the adhesive is not causing further damage.  Follow-up with wounds again in 2 weeks, sooner with concerns.  2.  Increase Lantus to 15 units daily from 12 units daily.  Continue insulin aspart and insulin scale lighting scale as currently prescribed.  3.  Encouraged leg elevation above her heart at least 1 hour 3 times daily.  4.  Start Prosource 1 ounce twice daily.  Check albumin and prealbumin in 4 weeks.  5.  Patient has easily palpable pulses.  To control the edema at this time would recommend leg elevation above heart at least 1 hour 3 times daily.  Could consider compression but will hold off at this time.  Improving her protein levels will help with the edema as well.    RAYMUNDO Turner   5/10/2021  2:56 PM

## 2021-05-24 ENCOUNTER — NURSING HOME VISIT (OUTPATIENT)
Dept: INTERNAL MEDICINE | Facility: OTHER | Age: 61
End: 2021-05-24
Payer: COMMERCIAL

## 2021-05-24 VITALS
TEMPERATURE: 97.4 F | DIASTOLIC BLOOD PRESSURE: 97 MMHG | RESPIRATION RATE: 18 BRPM | HEART RATE: 80 BPM | BODY MASS INDEX: 20.36 KG/M2 | WEIGHT: 130 LBS | SYSTOLIC BLOOD PRESSURE: 182 MMHG

## 2021-05-24 DIAGNOSIS — I87.8 VENOUS STASIS OF BOTH LOWER EXTREMITIES: ICD-10-CM

## 2021-05-24 DIAGNOSIS — E11.621 DIABETIC ULCER OF LEFT HEEL ASSOCIATED WITH TYPE 2 DIABETES MELLITUS, UNSPECIFIED ULCER STAGE (H): Primary | ICD-10-CM

## 2021-05-24 DIAGNOSIS — E46 MALNUTRITION, UNSPECIFIED TYPE (H): ICD-10-CM

## 2021-05-24 DIAGNOSIS — E11.65 UNCONTROLLED TYPE 2 DIABETES MELLITUS WITH HYPERGLYCEMIA (H): ICD-10-CM

## 2021-05-24 DIAGNOSIS — L97.429 DIABETIC ULCER OF LEFT HEEL ASSOCIATED WITH TYPE 2 DIABETES MELLITUS, UNSPECIFIED ULCER STAGE (H): Primary | ICD-10-CM

## 2021-05-24 DIAGNOSIS — I10 ESSENTIAL HYPERTENSION: ICD-10-CM

## 2021-05-24 PROCEDURE — 99309 SBSQ NF CARE MODERATE MDM 30: CPT | Performed by: NURSE PRACTITIONER

## 2021-05-24 ASSESSMENT — ENCOUNTER SYMPTOMS
APPETITE CHANGE: 0
WOUND: 1
SHORTNESS OF BREATH: 0
FEVER: 0
POLYDIPSIA: 0
CHEST TIGHTNESS: 0
POLYPHAGIA: 0

## 2021-05-24 NOTE — PROGRESS NOTES
Subjective  Patient is seen today in follow-up for diabetic ulcers of the left foot complicated by venous stasis edema.  Staff reports these are improving.  She is having dressing change every other day.  Trying to elevate legs above heart.  Also seen today for follow-up poorly controlled type 2 diabetes.  She has labile sugars.  At last appointment Lantus was increased to 15 units daily and mealtime insulin increased to 3 units 3 times daily.  I see that she is actually receiving 3 units in the morning, 3 units at supper and only 2 units at lunch.  This change has occurred since last visit.  Her sugars range from  with 3 blood sugars less than 70 usually in the afternoon or evening.  She has not needed glucagon.  She does have Dexcom.  Blood pressure also been running high at times.  These range from 115//97 with 6 out of 9 readings greater than 150 systolic blood pressure.  She currently receives lisinopril half tablet daily and Lasix 20 mg 2 pills daily.  She has protein malnutrition and is on Prosource twice daily.  Planning to have albumin level checked again in another couple of weeks.    Patient Active Problem List   Diagnosis     Essential hypertension     Hypothyroidism     Episodic mood disorder (H)     Pure hypercholesterolemia     Tobacco use disorder     Esophageal reflux     Allergic rhinitis     Vitamin D deficiency     Controlled type 2 diabetes mellitus with complication, with long-term current use of insulin (H)     HYPERLIPIDEMIA LDL GOAL <100     S/P gastric bypass     Malnutrition, unspecified type (H)     Venous stasis of both lower extremities     Diabetic ulcer of left heel associated with type 2 diabetes mellitus, unspecified ulcer stage (H)     Radial nerve palsy, right     Uncontrolled type 2 diabetes mellitus with hyperglycemia (H)     Past Medical History:   Diagnosis Date     DIABETES MELLITUS TYPE II-UNCOMPL 6/14/2006     HYPERTENSION NOS 6/14/2006     HYPOTHYROIDISM NOS  2006     Vitamin D deficiencies 2008     No past surgical history on file.  Social History     Socioeconomic History     Marital status:      Spouse name: Not on file     Number of children: Not on file     Years of education: Not on file     Highest education level: Not on file   Occupational History     Not on file   Social Needs     Financial resource strain: Not on file     Food insecurity     Worry: Not on file     Inability: Not on file     Transportation needs     Medical: Not on file     Non-medical: Not on file   Tobacco Use     Smoking status: Current Every Day Smoker     Packs/day: 0.50     Years: 46.00     Pack years: 23.00     Last attempt to quit: 2008     Years since quittin.7     Smokeless tobacco: Never Used     Tobacco comment: down to 5 cigs/ day   Substance and Sexual Activity     Alcohol use: Not Currently     Comment: 1 liter of whiskey per day, last drank at 1100 3/5/2019     Drug use: No     Sexual activity: Not Currently   Lifestyle     Physical activity     Days per week: Not on file     Minutes per session: Not on file     Stress: Not on file   Relationships     Social connections     Talks on phone: Not on file     Gets together: Not on file     Attends Rastafarian service: Not on file     Active member of club or organization: Not on file     Attends meetings of clubs or organizations: Not on file     Relationship status: Not on file     Intimate partner violence     Fear of current or ex partner: Not on file     Emotionally abused: Not on file     Physically abused: Not on file     Forced sexual activity: Not on file   Other Topics Concern     Parent/sibling w/ CABG, MI or angioplasty before 65F 55M? Not Asked   Social History Narrative     Not on file     Family History   Problem Relation Age of Onset     Hypertension Mother      Diabetes Mother      Breast Cancer Mother      Asthma Father      Diabetes Father      Hypertension Father      Cerebrovascular  Disease Father      Circulatory Father      Eye Disorder Father      Colon Cancer Maternal Uncle 50     Codeine, Seasonal allergies, Sulfa drugs, and Vicodin [hydrocodone-acetaminophen]    Skilled Nursing Facility Medication Record reviewed    End of Life Planning:   DNR/DNI    Review of Systems:  Review of Systems   Constitutional: Negative for appetite change and fever.   Respiratory: Negative for chest tightness and shortness of breath.    Cardiovascular: Positive for peripheral edema. Negative for chest pain.   Endocrine: Negative for polydipsia, polyphagia and polyuria.   Skin: Positive for wound.       Objective:   BP (!) 182/97   Pulse 80   Temp 97.4  F (36.3  C)   Resp 18   Wt 59 kg (130 lb)   LMP 06/26/2008 (LMP Unknown)   BMI 20.36 kg/m    Physical Exam  Pleasant female no acute distress.  Affect normal.  Alert and oriented x4.  Lung fields clear to auscultation.  Cardiovascular regular rate and rhythm.  Bilateral extremities with 1+ edema.  Ulcer located over the heel has closed.  The ulcer located over the distal dorsum of foot has healed.  There continues to be a diabetic ulcer along the proximal dorsal foot, measurements through nursing staff.  Wound is covered with 85% yellow slough and 15% light pink wound bed.  No surrounding erythema warmth or maceration.  Moderate amount of light yellow nonodorous drainage from wound.    Assessment:    ICD-10-CM    1. Diabetic ulcer of left heel associated with type 2 diabetes mellitus, unspecified ulcer stage (H)  E11.621     L97.429    2. Venous stasis of both lower extremities  I87.8    3. Uncontrolled type 2 diabetes mellitus with hyperglycemia (H)  E11.65    4. Essential hypertension  I10    5. Malnutrition, unspecified type (H)  E46        Plan:   1.  Diabetic ulcers have healed over the distal dorsum of the foot and the left heel.  Continues to have the wound located over the more proximal dorsum of the left foot.  Recommend dressing change every  other day by cleansing with saline then applying Santyl debridement ointment, cutting calcium alginate to size of wound and placing in wound bed then covering with bordered foam.  Follow-up again in 1 month, sooner with concerns.  Elevate legs above heart at least 1 hour 3 times daily as able.  2.  Decrease NovoLog to 2 units 3 times daily with meals, continue NovoLog sliding scale.  Increase Lantus to 17 units daily.  If hypoglycemia continues will need follow-up.  We will plan to follow-up in 1 month for type 2 diabetes, sooner as directed above.  3.  Increase lisinopril to 20 mg 1 pill daily.  Check basic metabolic panel in 2 weeks.  4.  Continue with Prosource and continue with plan to check albumin again in approximately 2 weeks.      RAYMUNDO Turner   5/24/2021  2:08 PM

## 2021-05-25 ENCOUNTER — RESULTS ONLY (OUTPATIENT)
Dept: LAB | Age: 61
End: 2021-05-25

## 2021-05-25 ENCOUNTER — MEDICAL CORRESPONDENCE (OUTPATIENT)
Dept: HEALTH INFORMATION MANAGEMENT | Facility: OTHER | Age: 61
End: 2021-05-25

## 2021-05-25 ENCOUNTER — RECORDS - HEALTHEAST (OUTPATIENT)
Dept: ADMINISTRATIVE | Facility: CLINIC | Age: 61
End: 2021-05-25

## 2021-05-25 LAB
T4 FREE SERPL-MCNC: 1.09 NG/DL (ref 0.6–1.6)
TSH SERPL DL<=0.05 MIU/L-ACNC: 0.34 IU/ML (ref 0.34–5.6)

## 2021-05-28 ENCOUNTER — RECORDS - HEALTHEAST (OUTPATIENT)
Dept: ADMINISTRATIVE | Facility: CLINIC | Age: 61
End: 2021-05-28

## 2021-06-06 ENCOUNTER — NURSE TRIAGE (OUTPATIENT)
Dept: NURSING | Facility: CLINIC | Age: 61
End: 2021-06-06

## 2021-06-06 ENCOUNTER — HOSPITAL ENCOUNTER (EMERGENCY)
Facility: OTHER | Age: 61
Discharge: NURSING, REHAB OR LONG-TERM CARE | End: 2021-06-06
Attending: STUDENT IN AN ORGANIZED HEALTH CARE EDUCATION/TRAINING PROGRAM | Admitting: STUDENT IN AN ORGANIZED HEALTH CARE EDUCATION/TRAINING PROGRAM
Payer: COMMERCIAL

## 2021-06-06 ENCOUNTER — APPOINTMENT (OUTPATIENT)
Dept: GENERAL RADIOLOGY | Facility: OTHER | Age: 61
End: 2021-06-06
Attending: STUDENT IN AN ORGANIZED HEALTH CARE EDUCATION/TRAINING PROGRAM
Payer: COMMERCIAL

## 2021-06-06 VITALS
HEART RATE: 79 BPM | TEMPERATURE: 98.1 F | WEIGHT: 143 LBS | SYSTOLIC BLOOD PRESSURE: 148 MMHG | OXYGEN SATURATION: 96 % | BODY MASS INDEX: 22.4 KG/M2 | DIASTOLIC BLOOD PRESSURE: 66 MMHG | RESPIRATION RATE: 16 BRPM

## 2021-06-06 DIAGNOSIS — R73.9 HYPERGLYCEMIA: ICD-10-CM

## 2021-06-06 LAB
ALBUMIN SERPL-MCNC: 3.1 G/DL (ref 3.5–5.7)
ALBUMIN UR-MCNC: 10 MG/DL
ALP SERPL-CCNC: 158 U/L (ref 34–104)
ALT SERPL W P-5'-P-CCNC: 54 U/L (ref 7–52)
ANION GAP SERPL CALCULATED.3IONS-SCNC: 4 MMOL/L (ref 3–14)
ANION GAP SERPL CALCULATED.3IONS-SCNC: 7 MMOL/L (ref 3–14)
APPEARANCE UR: CLEAR
AST SERPL W P-5'-P-CCNC: 58 U/L (ref 13–39)
BASOPHILS # BLD AUTO: 0 10E9/L (ref 0–0.2)
BASOPHILS NFR BLD AUTO: 0.4 %
BILIRUB SERPL-MCNC: 0.4 MG/DL (ref 0.3–1)
BILIRUB UR QL STRIP: NEGATIVE
BUN SERPL-MCNC: 25 MG/DL (ref 7–25)
BUN SERPL-MCNC: 28 MG/DL (ref 7–25)
CALCIUM SERPL-MCNC: 8.3 MG/DL (ref 8.6–10.3)
CALCIUM SERPL-MCNC: 8.5 MG/DL (ref 8.6–10.3)
CHLORIDE SERPL-SCNC: 100 MMOL/L (ref 98–107)
CHLORIDE SERPL-SCNC: 106 MMOL/L (ref 98–107)
CO2 SERPL-SCNC: 25 MMOL/L (ref 21–31)
CO2 SERPL-SCNC: 26 MMOL/L (ref 21–31)
COLOR UR AUTO: YELLOW
CREAT SERPL-MCNC: 0.63 MG/DL (ref 0.6–1.2)
CREAT SERPL-MCNC: 0.8 MG/DL (ref 0.6–1.2)
DIFFERENTIAL METHOD BLD: ABNORMAL
EOSINOPHIL # BLD AUTO: 0.1 10E9/L (ref 0–0.7)
EOSINOPHIL NFR BLD AUTO: 2.3 %
ERYTHROCYTE [DISTWIDTH] IN BLOOD BY AUTOMATED COUNT: 13 % (ref 10–15)
GFR SERPL CREATININE-BSD FRML MDRD: 73 ML/MIN/{1.73_M2}
GFR SERPL CREATININE-BSD FRML MDRD: >90 ML/MIN/{1.73_M2}
GLUCOSE SERPL-MCNC: 286 MG/DL (ref 70–105)
GLUCOSE SERPL-MCNC: 608 MG/DL (ref 70–105)
GLUCOSE UR STRIP-MCNC: >1000 MG/DL
HBA1C MFR BLD: 6.8 % (ref 4–6)
HCO3 BLDV-SCNC: 26 MMOL/L (ref 21–28)
HCT VFR BLD AUTO: 25.4 % (ref 35–47)
HGB BLD-MCNC: 8.5 G/DL (ref 11.7–15.7)
HGB UR QL STRIP: ABNORMAL
IMM GRANULOCYTES # BLD: 0 10E9/L (ref 0–0.4)
IMM GRANULOCYTES NFR BLD: 0.2 %
KETONES BLD-SCNC: 0.1 MMOL/L (ref 0–0.6)
KETONES UR STRIP-MCNC: NEGATIVE MG/DL
LEUKOCYTE ESTERASE UR QL STRIP: NEGATIVE
LYMPHOCYTES # BLD AUTO: 0.9 10E9/L (ref 0.8–5.3)
LYMPHOCYTES NFR BLD AUTO: 18.4 %
MCH RBC QN AUTO: 32.8 PG (ref 26.5–33)
MCHC RBC AUTO-ENTMCNC: 33.5 G/DL (ref 31.5–36.5)
MCV RBC AUTO: 98 FL (ref 78–100)
MONOCYTES # BLD AUTO: 0.5 10E9/L (ref 0–1.3)
MONOCYTES NFR BLD AUTO: 9.7 %
MUCOUS THREADS #/AREA URNS LPF: PRESENT /LPF
NEUTROPHILS # BLD AUTO: 3.3 10E9/L (ref 1.6–8.3)
NEUTROPHILS NFR BLD AUTO: 69 %
NITRATE UR QL: NEGATIVE
NT-PROBNP SERPL-MCNC: 145 PG/ML (ref 0–100)
O2/TOTAL GAS SETTING VFR VENT: 0 %
OXYHGB MFR BLDV: 76 %
PCO2 BLDV: 43 MM HG (ref 40–50)
PH BLDV: 7.39 PH (ref 7.32–7.43)
PH UR STRIP: 6 PH (ref 5–7)
PLATELET # BLD AUTO: 190 10E9/L (ref 150–450)
PO2 BLDV: 45 MM HG (ref 25–47)
POTASSIUM SERPL-SCNC: 3.7 MMOL/L (ref 3.5–5.1)
POTASSIUM SERPL-SCNC: 4.1 MMOL/L (ref 3.5–5.1)
PROT SERPL-MCNC: 5.9 G/DL (ref 6.4–8.9)
RBC # BLD AUTO: 2.59 10E12/L (ref 3.8–5.2)
RBC #/AREA URNS AUTO: 11 /HPF (ref 0–2)
SODIUM SERPL-SCNC: 132 MMOL/L (ref 134–144)
SODIUM SERPL-SCNC: 136 MMOL/L (ref 134–144)
SOURCE: ABNORMAL
SP GR UR STRIP: 1.01 (ref 1–1.03)
UROBILINOGEN UR STRIP-MCNC: NORMAL MG/DL (ref 0–2)
WBC # BLD AUTO: 4.8 10E9/L (ref 4–11)
WBC #/AREA URNS AUTO: 4 /HPF (ref 0–5)

## 2021-06-06 PROCEDURE — 258N000003 HC RX IP 258 OP 636: Performed by: STUDENT IN AN ORGANIZED HEALTH CARE EDUCATION/TRAINING PROGRAM

## 2021-06-06 PROCEDURE — 71046 X-RAY EXAM CHEST 2 VIEWS: CPT

## 2021-06-06 PROCEDURE — 36415 COLL VENOUS BLD VENIPUNCTURE: CPT | Performed by: STUDENT IN AN ORGANIZED HEALTH CARE EDUCATION/TRAINING PROGRAM

## 2021-06-06 PROCEDURE — 82010 KETONE BODYS QUAN: CPT | Performed by: STUDENT IN AN ORGANIZED HEALTH CARE EDUCATION/TRAINING PROGRAM

## 2021-06-06 PROCEDURE — 83036 HEMOGLOBIN GLYCOSYLATED A1C: CPT | Performed by: STUDENT IN AN ORGANIZED HEALTH CARE EDUCATION/TRAINING PROGRAM

## 2021-06-06 PROCEDURE — 80048 BASIC METABOLIC PNL TOTAL CA: CPT | Performed by: STUDENT IN AN ORGANIZED HEALTH CARE EDUCATION/TRAINING PROGRAM

## 2021-06-06 PROCEDURE — 96361 HYDRATE IV INFUSION ADD-ON: CPT | Performed by: STUDENT IN AN ORGANIZED HEALTH CARE EDUCATION/TRAINING PROGRAM

## 2021-06-06 PROCEDURE — 82805 BLOOD GASES W/O2 SATURATION: CPT | Performed by: STUDENT IN AN ORGANIZED HEALTH CARE EDUCATION/TRAINING PROGRAM

## 2021-06-06 PROCEDURE — 81001 URINALYSIS AUTO W/SCOPE: CPT | Performed by: STUDENT IN AN ORGANIZED HEALTH CARE EDUCATION/TRAINING PROGRAM

## 2021-06-06 PROCEDURE — 96374 THER/PROPH/DIAG INJ IV PUSH: CPT | Performed by: STUDENT IN AN ORGANIZED HEALTH CARE EDUCATION/TRAINING PROGRAM

## 2021-06-06 PROCEDURE — 99283 EMERGENCY DEPT VISIT LOW MDM: CPT | Performed by: STUDENT IN AN ORGANIZED HEALTH CARE EDUCATION/TRAINING PROGRAM

## 2021-06-06 PROCEDURE — 258N000002 HC RX IP 258 OP 250: Performed by: STUDENT IN AN ORGANIZED HEALTH CARE EDUCATION/TRAINING PROGRAM

## 2021-06-06 PROCEDURE — 250N000012 HC RX MED GY IP 250 OP 636 PS 637: Performed by: STUDENT IN AN ORGANIZED HEALTH CARE EDUCATION/TRAINING PROGRAM

## 2021-06-06 PROCEDURE — 83880 ASSAY OF NATRIURETIC PEPTIDE: CPT | Performed by: STUDENT IN AN ORGANIZED HEALTH CARE EDUCATION/TRAINING PROGRAM

## 2021-06-06 PROCEDURE — 83930 ASSAY OF BLOOD OSMOLALITY: CPT | Performed by: STUDENT IN AN ORGANIZED HEALTH CARE EDUCATION/TRAINING PROGRAM

## 2021-06-06 PROCEDURE — 80053 COMPREHEN METABOLIC PANEL: CPT | Performed by: STUDENT IN AN ORGANIZED HEALTH CARE EDUCATION/TRAINING PROGRAM

## 2021-06-06 PROCEDURE — 85025 COMPLETE CBC W/AUTO DIFF WBC: CPT | Performed by: STUDENT IN AN ORGANIZED HEALTH CARE EDUCATION/TRAINING PROGRAM

## 2021-06-06 PROCEDURE — 99284 EMERGENCY DEPT VISIT MOD MDM: CPT | Mod: 25 | Performed by: STUDENT IN AN ORGANIZED HEALTH CARE EDUCATION/TRAINING PROGRAM

## 2021-06-06 RX ORDER — DEXTROSE MONOHYDRATE 25 G/50ML
25-50 INJECTION, SOLUTION INTRAVENOUS
Status: DISCONTINUED | OUTPATIENT
Start: 2021-06-06 | End: 2021-06-07 | Stop reason: HOSPADM

## 2021-06-06 RX ORDER — SODIUM CHLORIDE 450 MG/100ML
1000 INJECTION, SOLUTION INTRAVENOUS CONTINUOUS
Status: DISCONTINUED | OUTPATIENT
Start: 2021-06-06 | End: 2021-06-07 | Stop reason: HOSPADM

## 2021-06-06 RX ADMIN — SODIUM CHLORIDE 1000 ML: 4.5 INJECTION, SOLUTION INTRAVENOUS at 20:58

## 2021-06-06 RX ADMIN — SODIUM CHLORIDE 1000 ML: 9 INJECTION, SOLUTION INTRAVENOUS at 19:14

## 2021-06-06 RX ADMIN — INSULIN HUMAN 6 UNITS: 100 INJECTION, SOLUTION PARENTERAL at 20:20

## 2021-06-06 NOTE — ED TRIAGE NOTES
ED Nursing Triage Note (General)   ________________________________    Funmilayo Stratton is a 61 year old Female that presents to triage ambulance.  With history of hyperglycemia, pt states she is thirsty and urinating a lot.    BP (!) 170/73   Pulse 85   Temp 98.1  F (36.7  C) (Tympanic)   Resp 16   Wt 64.9 kg (143 lb)   LMP 06/26/2008 (LMP Unknown)   SpO2 92%   BMI 22.40 kg/m  t  Patient appears alert , in no acute distress., and cooperative behavior.    GCS Total = 15  Airway: intact  Breathing noted as Normal  Circulation Normal  Skin:  Normal      PRE HOSPITAL PRIOR LIVING SITUATION [facility] for two more weeks.

## 2021-06-06 NOTE — TELEPHONE ENCOUNTER
582 is her current blood sugar    She is feeling fine - no symptoms    Patient feels that she might have some URI symptoms today    novolog 2 units bid    Sliding scale 200-275 1unit    276-350 2 units    351-425 3 units    426-500 5 units    lantus in the am 17 units    No provider on call for this clinic.    Pt will need to go to the ER    Sarah Hopkins RN  [place] Nurse Advisor  5:33 PM  6/6/2021      Reason for Disposition    Blood glucose > 500 mg/dL (27.8 mmol/L)    Additional Information    Negative: Unconscious or difficult to awaken    Negative: Acting confused (e.g., disoriented, slurred speech)    Negative: Very weak (e.g., can't stand)    Negative: Sounds like a life-threatening emergency to the triager    Negative: [1] Vomiting AND [2] signs of dehydration (e.g., very dry mouth, lightheaded, dark urine)    Negative: [1] Blood glucose > 240 mg/dL (13.3 mmol/L) AND [2] rapid breathing    Protocols used: DIABETES - HIGH BLOOD SUGAR-A-

## 2021-06-07 ENCOUNTER — NURSING HOME VISIT (OUTPATIENT)
Dept: GERIATRICS | Facility: OTHER | Age: 61
End: 2021-06-07
Payer: COMMERCIAL

## 2021-06-07 ENCOUNTER — DOCUMENTATION ONLY (OUTPATIENT)
Dept: OTHER | Facility: CLINIC | Age: 61
End: 2021-06-07

## 2021-06-07 VITALS
BODY MASS INDEX: 20.99 KG/M2 | WEIGHT: 134 LBS | DIASTOLIC BLOOD PRESSURE: 84 MMHG | RESPIRATION RATE: 18 BRPM | SYSTOLIC BLOOD PRESSURE: 134 MMHG | HEART RATE: 81 BPM | OXYGEN SATURATION: 98 %

## 2021-06-07 DIAGNOSIS — F31.70 BIPOLAR AFFECTIVE DISORDER IN REMISSION (H): ICD-10-CM

## 2021-06-07 DIAGNOSIS — E03.9 HYPOTHYROIDISM, UNSPECIFIED TYPE: ICD-10-CM

## 2021-06-07 DIAGNOSIS — G89.29 CHRONIC BILATERAL LOW BACK PAIN WITHOUT SCIATICA: ICD-10-CM

## 2021-06-07 DIAGNOSIS — L97.529 DIABETIC ULCER OF LEFT FOOT ASSOCIATED WITH TYPE 1 DIABETES MELLITUS, UNSPECIFIED PART OF FOOT, UNSPECIFIED ULCER STAGE (H): ICD-10-CM

## 2021-06-07 DIAGNOSIS — E10.621 DIABETIC ULCER OF LEFT FOOT ASSOCIATED WITH TYPE 1 DIABETES MELLITUS, UNSPECIFIED PART OF FOOT, UNSPECIFIED ULCER STAGE (H): ICD-10-CM

## 2021-06-07 DIAGNOSIS — R29.6 FALLS FREQUENTLY: ICD-10-CM

## 2021-06-07 DIAGNOSIS — M54.50 CHRONIC BILATERAL LOW BACK PAIN WITHOUT SCIATICA: ICD-10-CM

## 2021-06-07 DIAGNOSIS — R73.9 HYPERGLYCEMIA: ICD-10-CM

## 2021-06-07 DIAGNOSIS — E10.69 TYPE 1 DIABETES MELLITUS WITH OTHER SPECIFIED COMPLICATION (H): Primary | ICD-10-CM

## 2021-06-07 DIAGNOSIS — M62.81 GENERALIZED MUSCLE WEAKNESS: ICD-10-CM

## 2021-06-07 DIAGNOSIS — F41.8 DEPRESSION WITH ANXIETY: ICD-10-CM

## 2021-06-07 LAB — OSMOLALITY SERPL: 317 MMOL/KG (ref 275–295)

## 2021-06-07 PROCEDURE — 99309 SBSQ NF CARE MODERATE MDM 30: CPT | Performed by: NURSE PRACTITIONER

## 2021-06-07 ASSESSMENT — ENCOUNTER SYMPTOMS
DYSURIA: 0
WEAKNESS: 1
LIGHT-HEADEDNESS: 0
DIZZINESS: 0
ENDOCRINE NEGATIVE: 1
ABDOMINAL PAIN: 0
FEVER: 0
FATIGUE: 0
SHORTNESS OF BREATH: 0
PALPITATIONS: 0
DYSPHORIC MOOD: 0
WOUND: 1
DIFFICULTY URINATING: 0
MYALGIAS: 0
CHEST TIGHTNESS: 0
NERVOUS/ANXIOUS: 0
VOMITING: 0
ARTHRALGIAS: 0

## 2021-06-07 NOTE — DISCHARGE INSTRUCTIONS
Please review attached instructions including reasons to return to the emergency department. It sounds like you may have a viral head cold that may be contributing to your high glucose. Please follow up with your PCP this coming week to review possible diabetes medication changes. Your A1C was 6.8 down from 8.2 one month ago. Check glucose before going to bed.

## 2021-06-07 NOTE — PROGRESS NOTES
Subjective:  She is seen today for 60-day recertification.  Patient actually was seen in the emergency department yesterday for a very high blood sugar.  Her blood sugar was 680.  She had not really done anything different and had had fairly well-controlled diabetes in the last couple of months with A1c at ED yesterday at 6.8%.  She did report to the emergency room physician that maybe she was get a little upset stomach and also a head cold.  She does not have cough or shortness of breath.  She does use tobacco.  She was treated with insulin and blood sugar improved to the 200s after treatment.  Since being back [facility] her blood sugars have been 182 and 297, slightly higher than recent baseline but otherwise pretty stable.  She does have orders for long and short acting insulin and sliding scale insulin.  Is also on aspirin, statin and ACE inhibitor.  She has a diabetic ulcer on the left foot that is improving.  She is working with PT and OT as she was having significant muscle weakness and frequent falls and chronic low back pain and that is improving.  She is followed by psychiatry for bipolar affective disorder and depression and anxiety.      Patient Active Problem List   Diagnosis     Essential hypertension     Hypothyroidism     Episodic mood disorder (H)     Pure hypercholesterolemia     Tobacco use disorder     Esophageal reflux     Allergic rhinitis     Vitamin D deficiency     Controlled type 2 diabetes mellitus with complication, with long-term current use of insulin (H)     HYPERLIPIDEMIA LDL GOAL <100     S/P gastric bypass     Malnutrition, unspecified type (H)     Venous stasis of both lower extremities     Diabetic ulcer of left heel associated with type 2 diabetes mellitus, unspecified ulcer stage (H)     Radial nerve palsy, right     Uncontrolled type 2 diabetes mellitus with hyperglycemia (H)     Past Medical History:   Diagnosis Date     DIABETES MELLITUS TYPE II-UNCOMPL  2006     HYPERTENSION NOS 2006     HYPOTHYROIDISM NOS 2006     Vitamin D deficiencies 2008     No past surgical history on file.  Social History     Socioeconomic History     Marital status:      Spouse name: Not on file     Number of children: Not on file     Years of education: Not on file     Highest education level: Not on file   Occupational History     Not on file   Social Needs     Financial resource strain: Not on file     Food insecurity     Worry: Not on file     Inability: Not on file     Transportation needs     Medical: Not on file     Non-medical: Not on file   Tobacco Use     Smoking status: Current Every Day Smoker     Packs/day: 0.50     Years: 46.00     Pack years: 23.00     Last attempt to quit: 2008     Years since quittin.8     Smokeless tobacco: Never Used     Tobacco comment: down to 5 cigs/ day   Substance and Sexual Activity     Alcohol use: Not Currently     Comment: 1 liter of whiskey per day, last drank at 1100 3/5/2019     Drug use: No     Sexual activity: Not Currently   Lifestyle     Physical activity     Days per week: Not on file     Minutes per session: Not on file     Stress: Not on file   Relationships     Social connections     Talks on phone: Not on file     Gets together: Not on file     Attends Che[employer] service: Not on file     Active member of club or organization: Not on file     Attends meetings of clubs or organizations: Not on file     Relationship status: Not on file     Intimate partner violence     Fear of current or ex partner: Not on file     Emotionally abused: Not on file     Physically abused: Not on file     Forced sexual activity: Not on file   Other Topics Concern     Parent/sibling w/ CABG, MI or angioplasty before 65F 55M? Not Asked   Social History Narrative     Not on file     Family History   Problem Relation Age of Onset     Hypertension Mother      Diabetes Mother      Breast Cancer Mother      Asthma Father       Diabetes Father      Hypertension Father      Cerebrovascular Disease Father      Circulatory Father      Eye Disorder Father      Colon Cancer Maternal Uncle 50     Codeine, Seasonal allergies, Sulfa drugs, and Vicodin [hydrocodone-acetaminophen]    Skilled Nursing Facility Medication Record reviewed    End of Life Planning:  DNR    Review of Systems:  Review of Systems   Constitutional: Negative for fatigue and fever.   Eyes: Negative for visual disturbance.   Respiratory: Negative for chest tightness and shortness of breath.    Cardiovascular: Negative for chest pain and palpitations.   Gastrointestinal: Negative for abdominal pain and vomiting.   Endocrine: Negative.    Genitourinary: Negative for difficulty urinating and dysuria.   Musculoskeletal: Positive for gait problem. Negative for arthralgias and myalgias.   Skin: Positive for wound.   Neurological: Positive for weakness. Negative for dizziness and light-headedness.   Psychiatric/Behavioral: Negative for behavioral problems and dysphoric mood. The patient is not nervous/anxious.        Objective:   /84   Pulse 81   Resp 18   Wt 60.8 kg (134 lb)   LMP 06/26/2008 (LMP Unknown)   SpO2 98%   BMI 20.99 kg/m    Physical Exam  Pleasant female no acute distress.  Affect normal.  Alert and pleasant.  Skin color pink.  Neck supple and without adenopathy.  Lung fields with end expiratory faint wheeze that clears with coughing.  Patient just finished a cigarette.  Cardiovascular regular rate and rhythm.  Abdomen soft and without masses, tenderness and organomegaly.  Diabetic ulcer along plantar surface of left heel has closed.  Diabetic ulcer along dorsal surface of left midfoot continues to have small amount of slough at wound bed.  Moderate amount of light yellow nonodorous drainage.  No surrounding erythema warmth or maceration, showing improvement.  Measurements through nursing staff.  Extremity without edema.    Emergency department notes,  laboratory and diagnostic studies reviewed and discussed.  Assessment:    ICD-10-CM    1. Type 1 diabetes mellitus with other specified complication (H)  E10.69    2. Hyperglycemia  R73.9    3. Generalized muscle weakness  M62.81    4. Falls frequently  R29.6    5. Chronic bilateral low back pain without sciatica  M54.5     G89.29    6. Hypothyroidism, unspecified type  E03.9    7. Diabetic ulcer of left foot associated with type 1 diabetes mellitus, unspecified part of foot, unspecified ulcer stage (H)  E10.621     L97.529    8. Bipolar affective disorder in remission (H)  F31.70    9. Depression with anxiety  F41.8        Plan:   1.  Diabetes overall well controlled.  Likely had elevated sugar secondary to recent illness.  She is stable and is feeling better.  2.  She had a questionable chest x-ray for atelectasis versus early pneumonia however he does not have symptoms of pneumonia and is feeling better.  We will continue to monitor and not add antibiotics at this time.  Not interested in tobacco cessation.  3.  Continue to work with PT and OT on strengthening.  4.  Continue to follow with psychiatry.    RAYMUNDO Turner   6/7/2021  1:57 PM

## 2021-06-07 NOTE — ED PROVIDER NOTES
History     Chief Complaint   Patient presents with     Hyperglycemia       Funmilayo Stratton is a 61 year old female presenting with hyperglycemia. She has a history of diabetes mellitus and has been using all diabetes medications as prescribed with no recent medication changes.  Reports diabetes previously well controlled.  Over the past month she has been living at [facility] for rehab after a fall and left foot wound and has been doing well until yesterday.  Yesterday she developed some mild abdominal pain with episode of vomiting, with abdominal pain resolved.  Overnight she had some sweats today just feels fatigued and possibly she is developing a head cold with congestion.  She is also had a mild cough that is new.  She is a current smoker.  No diet changes over the past several days.  She also reports some increased frequency and thirst over the past day as well. Denies fever, chest pain, shortness of breath, abdominal pain, other pain, nausea, vomiting, dysuria, diarrhea.    Allergies   Allergen Reactions     Codeine      Seasonal Allergies      Sulfa Drugs Rash     Vicodin [Hydrocodone-Acetaminophen] Rash       Patient Active Problem List    Diagnosis Date Noted     Uncontrolled type 2 diabetes mellitus with hyperglycemia (H) 05/04/2021     Priority: Medium     Radial nerve palsy, right 02/01/2021     Priority: Medium     S/P gastric bypass 12/01/2020     Priority: Medium     Malnutrition, unspecified type (H) 12/01/2020     Priority: Medium     Venous stasis of both lower extremities 12/01/2020     Priority: Medium     Diabetic ulcer of left heel associated with type 2 diabetes mellitus, unspecified ulcer stage (H) 12/01/2020     Priority: Medium     HYPERLIPIDEMIA LDL GOAL <100 05/09/2010     Priority: Medium     Controlled type 2 diabetes mellitus with complication, with long-term current use of insulin (H) 05/02/2010     Priority: Medium     Vitamin D deficiency 09/21/2008     Priority: Medium      (Problem list name updated by automated process. Provider to review and confirm.)       Allergic rhinitis 2008     Priority: Medium     Problem list name updated by automated process. Provider to review       Esophageal reflux 2008     Priority: Medium     Tobacco use disorder 11/15/2007     Priority: Medium     Pure hypercholesterolemia 10/17/2006     Priority: Medium     Episodic mood disorder (H) 2006     Priority: Medium     Problem list name updated by automated process. Provider to review       Essential hypertension 2006     Priority: Medium     Problem list name updated by automated process. Provider to review       Hypothyroidism 2006     Priority: Medium     Problem list name updated by automated process. Provider to review         Past Medical History:   Diagnosis Date     DIABETES MELLITUS TYPE II-UNCOMPL 2006     HYPERTENSION NOS 2006     HYPOTHYROIDISM NOS 2006     Vitamin D deficiencies 2008       History reviewed. No pertinent surgical history.    Family History   Problem Relation Age of Onset     Hypertension Mother      Diabetes Mother      Breast Cancer Mother      Asthma Father      Diabetes Father      Hypertension Father      Cerebrovascular Disease Father      Circulatory Father      Eye Disorder Father      Colon Cancer Maternal Uncle 50       Social History     Tobacco Use     Smoking status: Current Every Day Smoker     Packs/day: 0.50     Years: 46.00     Pack years: 23.00     Last attempt to quit: 2008     Years since quittin.8     Smokeless tobacco: Never Used     Tobacco comment: down to 5 cigs/ day   Substance Use Topics     Alcohol use: Not Currently     Comment: 1 liter of whiskey per day, last drank at 1100 3/5/2019     Drug use: No       Medications:    acetaminophen (TYLENOL) 500 MG tablet  albuterol (PROAIR HFA/PROVENTIL HFA/VENTOLIN HFA) 108 (90 Base) MCG/ACT inhaler  amoxicillin-clavulanate (AUGMENTIN) 875-125 MG  tablet  aspirin 81 MG EC tablet  BD U/F III SHORT PEN NEEDLE 31G X 8 MM MISC  bisacodyl (DULCOLAX) 5 MG EC tablet  buPROPion (WELLBUTRIN XL) 150 MG 24 hr tablet  buPROPion (WELLBUTRIN XL) 300 MG 24 hr tablet  Calcium Carb-Cholecalciferol 500-10 MG-MCG CHEW  calcium carbonate-vitamin D (OS-BEST) 500-400 MG-UNIT tablet  Continuous Blood Gluc Sensor (DEXCOM G6 SENSOR) MISC  Continuous Blood Gluc Transmit (DEXCOM G6 TRANSMITTER) MISC  DULoxetine (CYMBALTA) 20 MG capsule  ferrous sulfate (FEROSUL) 325 (65 Fe) MG tablet  fish oil-omega-3 fatty acids 1000 MG capsule  fluticasone (FLONASE) 50 MCG/ACT nasal spray  furosemide (LASIX) 20 MG tablet  furosemide (LASIX) 40 MG tablet  gabapentin (NEURONTIN) 100 MG capsule  GLUCAGON EMERGENCY 1 MG kit  insulin aspart (NOVOLOG PEN) 100 UNIT/ML pen  insulin glargine (LANTUS PEN) 100 UNIT/ML pen  LACTOBACILLUS PO  lamoTRIgine (LAMICTAL) 100 MG tablet  lamoTRIgine (LAMICTAL) 200 MG tablet  lamoTRIgine (LAMICTAL) 200 MG tablet  levothyroxine (SYNTHROID/LEVOTHROID) 125 MCG tablet  lisinopril (ZESTRIL) 10 MG tablet  lisinopril (ZESTRIL) 20 MG tablet  mirtazapine (REMERON) 45 MG tablet  mirtazapine (REMERON) 45 MG tablet  MULTIVITAMIN/IRON OR TABS  naltrexone (DEPADE/REVIA) 50 MG tablet  omeprazole (PRILOSEC) 40 MG DR capsule  ondansetron (ZOFRAN) 4 MG tablet  ONE TOUCH ULTRA BONUS PACK STRP   VI  ONETOUCH ULTRASOFT LANCETS MISC  polyethylene glycol (MIRALAX) 17 GM/Dose powder  potassium chloride ER (KLOR-CON M) 20 MEQ CR tablet  QUEtiapine (SEROQUEL) 100 MG tablet  QUEtiapine (SEROQUEL) 50 MG tablet  rivaroxaban ANTICOAGULANT (XARELTO ANTICOAGULANT) 10 MG TABS tablet  rivaroxaban ANTICOAGULANT (XARELTO ANTICOAGULANT) 20 MG TABS tablet  rivaroxaban ANTICOAGULANT (XARELTO) 15 MG TABS tablet  Skin Protectants, Misc. (EUCERIN) cream  topiramate (TOPAMAX) 25 MG tablet  topiramate (TOPAMAX) 50 MG tablet  UNABLE TO FIND  valACYclovir (VALTREX) 500 MG tablet  vitamin D3 (CHOLECALCIFEROL) 2000  units tablet        Review of Systems: See HPI for pertinent negative and positive findings.    Physical Exam   BP (!) 148/66   Pulse 79   Temp 98.1  F (36.7  C) (Tympanic)   Resp 16   Wt 64.9 kg (143 lb)   LMP 06/26/2008 (LMP Unknown)   SpO2 96%   BMI 22.40 kg/m       General: awake, comfortable  HEENT: atraumatic, neck supple  Respiratory: normal effort, clear to auscultation bilaterally  Cardiovascular: regular rate and rhythm, no murmurs or gallops  Abdomen: soft, nontender, nondistended  Extremities: no deformities, 1+ BLE edema, no tenderness  Skin: warm, dry, no rashes; left dorsal foot wound with recently new bandage that is clean with no surrounding inflammation, erythema, tenderness, discharge  Neuro: alert, no focal deficits    ED Course      ED Course as of Jun 07 0551   Sun Jun 06, 2021 1932 Glucose 608 per lab.      2331 Glucose in mid-200s now.          Results for orders placed or performed during the hospital encounter of 06/06/21 (from the past 24 hour(s))   CBC with platelets differential   Result Value Ref Range    WBC 4.8 4.0 - 11.0 10e9/L    RBC Count 2.59 (L) 3.8 - 5.2 10e12/L    Hemoglobin 8.5 (L) 11.7 - 15.7 g/dL    Hematocrit 25.4 (L) 35.0 - 47.0 %    MCV 98 78 - 100 fl    MCH 32.8 26.5 - 33.0 pg    MCHC 33.5 31.5 - 36.5 g/dL    RDW 13.0 10.0 - 15.0 %    Platelet Count 190 150 - 450 10e9/L    Diff Method Automated Method     % Neutrophils 69.0 %    % Lymphocytes 18.4 %    % Monocytes 9.7 %    % Eosinophils 2.3 %    % Basophils 0.4 %    % Immature Granulocytes 0.2 %    Absolute Neutrophil 3.3 1.6 - 8.3 10e9/L    Absolute Lymphocytes 0.9 0.8 - 5.3 10e9/L    Absolute Monocytes 0.5 0.0 - 1.3 10e9/L    Absolute Eosinophils 0.1 0.0 - 0.7 10e9/L    Absolute Basophils 0.0 0.0 - 0.2 10e9/L    Abs Immature Granulocytes 0.0 0 - 0.4 10e9/L   Comprehensive metabolic panel   Result Value Ref Range    Sodium 132 (L) 134 - 144 mmol/L    Potassium 4.1 3.5 - 5.1 mmol/L    Chloride 100 98 - 107  mmol/L    Carbon Dioxide 25 21 - 31 mmol/L    Anion Gap 7 3 - 14 mmol/L    Glucose 608 (HH) 70 - 105 mg/dL    Urea Nitrogen 28 (H) 7 - 25 mg/dL    Creatinine 0.80 0.60 - 1.20 mg/dL    GFR Estimate 73 >60 mL/min/[1.73_m2]    GFR Estimate If Black 89 >60 mL/min/[1.73_m2]    Calcium 8.5 (L) 8.6 - 10.3 mg/dL    Bilirubin Total 0.4 0.3 - 1.0 mg/dL    Albumin 3.1 (L) 3.5 - 5.7 g/dL    Protein Total 5.9 (L) 6.4 - 8.9 g/dL    Alkaline Phosphatase 158 (H) 34 - 104 U/L    ALT 54 (H) 7 - 52 U/L    AST 58 (H) 13 - 39 U/L   NT pro BNP   Result Value Ref Range    N-Terminal Pro BNP Inpatient 145 (H) 0 - 100 pg/mL   Ketone Beta-Hydroxybutyrate Quantitative   Result Value Ref Range    Ketone Quantitative 0.1 0.0 - 0.6 mmol/L   Hemoglobin A1c   Result Value Ref Range    Hemoglobin A1C 6.8 (H) 4.0 - 6.0 %   UA reflex to Microscopic and Culture    Specimen: Urine Midstream; Midstream Urine   Result Value Ref Range    Color Urine Yellow     Appearance Urine Clear     Glucose Urine >1000 (A) NEG^Negative mg/dL    Bilirubin Urine Negative NEG^Negative    Ketones Urine Negative NEG^Negative mg/dL    Specific Gravity Urine 1.010 1.003 - 1.035    Blood Urine Small (A) NEG^Negative    pH Urine 6.0 5.0 - 7.0 pH    Protein Albumin Urine 10 (A) NEG^Negative mg/dL    Urobilinogen mg/dL Normal 0.0 - 2.0 mg/dL    Nitrite Urine Negative NEG^Negative    Leukocyte Esterase Urine Negative NEG^Negative    Source Midstream Urine     RBC Urine 11 (H) 0 - 2 /HPF    WBC Urine 4 0 - 5 /HPF    Mucous Urine Present (A) NEG^Negative /LPF   XR Chest 2 Views    Narrative    PROCEDURE:  XR CHEST 2 VW    HISTORY:  cough - eval for infiltrate.     COMPARISON:  2008    FINDINGS:   The cardiac silhouette is normal in size. The pulmonary vasculature is  normal.  There is a small area of abnormal increase in density at the  right lung base suspicious for early pneumonia. No pleural effusion or  pneumothorax. Postoperative changes are seen in the upper thoracic  spine  and left clavicle.      Impression    IMPRESSION:  Small area of increased density at the right lung base  consistent with atelectasis or early pneumonia.      FERN MOULTON MD   Basic metabolic panel   Result Value Ref Range    Sodium 136 134 - 144 mmol/L    Potassium 3.7 3.5 - 5.1 mmol/L    Chloride 106 98 - 107 mmol/L    Carbon Dioxide 26 21 - 31 mmol/L    Anion Gap 4 3 - 14 mmol/L    Glucose 286 (H) 70 - 105 mg/dL    Urea Nitrogen 25 7 - 25 mg/dL    Creatinine 0.63 0.60 - 1.20 mg/dL    GFR Estimate >90 >60 mL/min/[1.73_m2]    GFR Estimate If Black >90 >60 mL/min/[1.73_m2]    Calcium 8.3 (L) 8.6 - 10.3 mg/dL   Blood gas venous and oxyhgb   Result Value Ref Range    Ph Venous 7.39 7.32 - 7.43 pH    PCO2 Venous 43 40 - 50 mm Hg    PO2 Venous 45 25 - 47 mm Hg    Bicarbonate Venous 26 21 - 28 mmol/L    FIO2 0     Oxyhemoglobin Venous 76 %       Medications   0.9% sodium chloride BOLUS (0 mLs Intravenous Stopped 6/6/21 2015)   insulin (regular) (HumuLIN R/NovoLIN R) injection 6 Units (6 Units Intravenous Given 6/6/21 2020)       Assessments & Plan (with Medical Decision Making)     I have reviewed the nursing notes.    Evaluated for hyperglycemia. Differential includes hyperglycemia, diabetic ketoacidosis, nonketotic hyperosmolar hyperglycemic state, medication noncompliance, diet noncompliance, viral infection, urinary tract infection. Given the patient's history, exam, and workup, a precipitating etiology for this patient's hyperglycemia is not definitively identified but seems likely to be viral URI.  Findings seem most consistent with HHS.  Treated in emergency department with IV fluids and insulin.  Blood glucose improved to 246.  Due to no significant underlying causes requiring treatment beyond supportive care and patient's good response to fluids and insulin, she appears appropriate for outpatient management.  Discharged home with instructions to check blood glucose prior to bedtime.  Due to A1c improved  versus 1 month ago, no changes to insulin were made and instead recommend follow-up with PCP to review diabetic medication management.  Discharged home with ED return precautions.    I have reviewed the findings, diagnosis, plan with the patient.    Discharge Medication List as of 6/6/2021 11:33 PM          Final diagnoses:   Hyperglycemia       6/6/2021   [facility]       Mirza King MD  06/07/21 0551

## 2021-06-08 ENCOUNTER — MEDICAL CORRESPONDENCE (OUTPATIENT)
Dept: HEALTH INFORMATION MANAGEMENT | Facility: OTHER | Age: 61
End: 2021-06-08

## 2021-06-08 ENCOUNTER — RESULTS ONLY (OUTPATIENT)
Dept: LAB | Age: 61
End: 2021-06-08

## 2021-06-08 LAB
ALBUMIN SERPL-MCNC: 3.3 G/DL (ref 3.5–5.7)
ALP SERPL-CCNC: 179 U/L (ref 34–104)
ALT SERPL W P-5'-P-CCNC: 53 U/L (ref 7–52)
ANION GAP SERPL CALCULATED.3IONS-SCNC: 9 MMOL/L (ref 3–14)
AST SERPL W P-5'-P-CCNC: 51 U/L (ref 13–39)
BILIRUB SERPL-MCNC: 0.6 MG/DL (ref 0.3–1)
BUN SERPL-MCNC: 20 MG/DL (ref 7–25)
CALCIUM SERPL-MCNC: 9.1 MG/DL (ref 8.6–10.3)
CHLORIDE SERPL-SCNC: 102 MMOL/L (ref 98–107)
CO2 SERPL-SCNC: 25 MMOL/L (ref 21–31)
CREAT SERPL-MCNC: 0.65 MG/DL (ref 0.6–1.2)
GFR SERPL CREATININE-BSD FRML MDRD: >90 ML/MIN/{1.73_M2}
GLUCOSE SERPL-MCNC: 417 MG/DL (ref 70–105)
POTASSIUM SERPL-SCNC: 4.4 MMOL/L (ref 3.5–5.1)
PREALB SERPL IA-MCNC: 15 MG/DL (ref 17–34)
PROT SERPL-MCNC: 5.8 G/DL (ref 6.4–8.9)
SODIUM SERPL-SCNC: 136 MMOL/L (ref 134–144)

## 2021-06-16 ENCOUNTER — APPOINTMENT (OUTPATIENT)
Dept: GERIATRICS | Facility: OTHER | Age: 61
End: 2021-06-16
Attending: NURSE PRACTITIONER
Payer: COMMERCIAL

## 2021-06-16 ENCOUNTER — NURSING HOME VISIT (OUTPATIENT)
Dept: GERIATRICS | Facility: OTHER | Age: 61
End: 2021-06-16
Payer: COMMERCIAL

## 2021-06-16 VITALS
OXYGEN SATURATION: 98 % | DIASTOLIC BLOOD PRESSURE: 98 MMHG | WEIGHT: 143 LBS | HEART RATE: 60 BPM | TEMPERATURE: 98 F | SYSTOLIC BLOOD PRESSURE: 174 MMHG | RESPIRATION RATE: 18 BRPM | BODY MASS INDEX: 22.4 KG/M2

## 2021-06-16 DIAGNOSIS — I10 ESSENTIAL HYPERTENSION: ICD-10-CM

## 2021-06-16 DIAGNOSIS — E11.8 CONTROLLED TYPE 2 DIABETES MELLITUS WITH COMPLICATION, WITH LONG-TERM CURRENT USE OF INSULIN (H): Primary | Chronic | ICD-10-CM

## 2021-06-16 DIAGNOSIS — Z79.4 CONTROLLED TYPE 2 DIABETES MELLITUS WITH COMPLICATION, WITH LONG-TERM CURRENT USE OF INSULIN (H): Primary | Chronic | ICD-10-CM

## 2021-06-16 PROCEDURE — 99309 SBSQ NF CARE MODERATE MDM 30: CPT | Performed by: NURSE PRACTITIONER

## 2021-06-16 ASSESSMENT — ENCOUNTER SYMPTOMS
POLYDIPSIA: 0
FEVER: 0
APPETITE CHANGE: 0
CONFUSION: 0
COUGH: 0
POLYPHAGIA: 0
HEADACHES: 0
CHEST TIGHTNESS: 0
SHORTNESS OF BREATH: 0
PALPITATIONS: 0

## 2021-06-16 NOTE — PROGRESS NOTES
Subjective:  She is seen today to follow-up on hypertension and type 2 diabetes.  She was last seen in May due to high blood pressure.  At that time lisinopril was increased to 20 mg daily and she continued Lasix 20 mg 2 pills daily.  Blood pressures range 134//94 with most of these blood pressures having a systolic pressure greater than 150.  Pulse usually ranges around 60.  She did have blood work completed on June 8, 2021 showing normal renal function with a BUN of 20 and creatinine of 0.65.  Potassium was 4.1.  She has poorly controlled type 2 diabetes.  Sugars have been improvement with recent diabetic management however they still range from 107-500 with sugars being checked 4 times daily.  She currently receives Lantus 17 units daily, NovoLog insulin 2 units 3 times daily with meals in addition to sliding scale insulin.  She is on statin, aspirin 81 mg daily and does have glucagon for emergency hypoglycemic management.    Patient Active Problem List   Diagnosis     Essential hypertension     Hypothyroidism     Episodic mood disorder (H)     Pure hypercholesterolemia     Tobacco use disorder     Esophageal reflux     Allergic rhinitis     Vitamin D deficiency     Controlled type 2 diabetes mellitus with complication, with long-term current use of insulin (H)     HYPERLIPIDEMIA LDL GOAL <100     S/P gastric bypass     Malnutrition, unspecified type (H)     Venous stasis of both lower extremities     Diabetic ulcer of left heel associated with type 2 diabetes mellitus, unspecified ulcer stage (H)     Radial nerve palsy, right     Uncontrolled type 2 diabetes mellitus with hyperglycemia (H)     Past Medical History:   Diagnosis Date     DIABETES MELLITUS TYPE II-UNCOMPL 6/14/2006     HYPERTENSION NOS 6/14/2006     HYPOTHYROIDISM NOS 6/14/2006     Vitamin D deficiencies 9/21/2008     No past surgical history on file.  Social History     Socioeconomic History     Marital status:      Spouse name: Not  on file     Number of children: Not on file     Years of education: Not on file     Highest education level: Not on file   Occupational History     Not on file   Social Needs     Financial resource strain: Not on file     Food insecurity     Worry: Not on file     Inability: Not on file     Transportation needs     Medical: Not on file     Non-medical: Not on file   Tobacco Use     Smoking status: Current Every Day Smoker     Packs/day: 0.50     Years: 46.00     Pack years: 23.00     Last attempt to quit: 2008     Years since quittin.8     Smokeless tobacco: Never Used     Tobacco comment: down to 5 cigs/ day   Substance and Sexual Activity     Alcohol use: Not Currently     Comment: 1 liter of whiskey per day, last drank at 1100 3/5/2019     Drug use: No     Sexual activity: Not Currently   Lifestyle     Physical activity     Days per week: Not on file     Minutes per session: Not on file     Stress: Not on file   Relationships     Social connections     Talks on phone: Not on file     Gets together: Not on file     Attends [employer] service: Not on file     Active member of club or organization: Not on file     Attends meetings of clubs or organizations: Not on file     Relationship status: Not on file     Intimate partner violence     Fear of current or ex partner: Not on file     Emotionally abused: Not on file     Physically abused: Not on file     Forced sexual activity: Not on file   Other Topics Concern     Parent/sibling w/ CABG, MI or angioplasty before 65F 55M? Not Asked   Social History Narrative     Not on file     Family History   Problem Relation Age of Onset     Hypertension Mother      Diabetes Mother      Breast Cancer Mother      Asthma Father      Diabetes Father      Hypertension Father      Cerebrovascular Disease Father      Circulatory Father      Eye Disorder Father      Colon Cancer Maternal Uncle 50     Codeine, Seasonal allergies, Sulfa drugs, and Vicodin  [hydrocodone-acetaminophen]    Skilled Nursing Facility Medication Record reviewed    End of Life Planning:  DNR/DNI      Review of Systems:  Review of Systems   Constitutional: Negative for appetite change and fever.   Eyes: Negative for visual disturbance.   Respiratory: Negative for cough, chest tightness and shortness of breath.    Cardiovascular: Positive for peripheral edema. Negative for chest pain and palpitations.   Endocrine: Negative for polydipsia, polyphagia and polyuria.   Neurological: Negative for headaches.   Psychiatric/Behavioral: Negative for confusion.       Objective:   BP (!) 174/98   Pulse 60   Temp 98  F (36.7  C)   Resp 18   Wt 64.9 kg (143 lb)   LMP 06/26/2008 (LMP Unknown)   SpO2 98%   BMI 22.40 kg/m    Physical Exam  Pleasant female no acute distress.  Affect normal.  Alert and oriented x4.  Lung fields clear to auscultation.  Cardiovascular regular.  Extremities with trace bilateral edema.    Assessment:    ICD-10-CM    1. Controlled type 2 diabetes mellitus with complication, with long-term current use of insulin (H)  E11.8     Z79.4    2. Essential hypertension  I10        Plan:   1.  Increase lisinopril to 30 mg daily.  Continue Lasix 20 mg 2 pills daily.  Check basic metabolic panel in 2 weeks and follow-up with hypertension at that time.  2.  Increase Lantus to 20 units daily.  Increase NovoLog to 4 units 3 times daily with meals.  Continue sliding scale insulin.  Continue aspirin and statin.      RAYMUNDO Turner   6/16/2021  2:48 PM

## 2021-06-24 ENCOUNTER — TRANSFERRED RECORDS (OUTPATIENT)
Dept: HEALTH INFORMATION MANAGEMENT | Facility: OTHER | Age: 61
End: 2021-06-24

## 2021-06-24 ENCOUNTER — APPOINTMENT (OUTPATIENT)
Dept: GERIATRICS | Facility: OTHER | Age: 61
End: 2021-06-24
Attending: NURSE PRACTITIONER
Payer: COMMERCIAL

## 2021-06-24 ENCOUNTER — NURSING HOME VISIT (OUTPATIENT)
Dept: GERIATRICS | Facility: OTHER | Age: 61
End: 2021-06-24
Payer: COMMERCIAL

## 2021-06-24 VITALS
DIASTOLIC BLOOD PRESSURE: 80 MMHG | RESPIRATION RATE: 17 BRPM | WEIGHT: 134 LBS | HEART RATE: 83 BPM | OXYGEN SATURATION: 97 % | SYSTOLIC BLOOD PRESSURE: 171 MMHG | BODY MASS INDEX: 20.99 KG/M2

## 2021-06-24 DIAGNOSIS — E11.65 UNCONTROLLED TYPE 2 DIABETES MELLITUS WITH HYPERGLYCEMIA (H): ICD-10-CM

## 2021-06-24 DIAGNOSIS — E10.621 DIABETIC ULCER OF LEFT FOOT ASSOCIATED WITH TYPE 1 DIABETES MELLITUS, UNSPECIFIED PART OF FOOT, UNSPECIFIED ULCER STAGE (H): Primary | ICD-10-CM

## 2021-06-24 DIAGNOSIS — L97.529 DIABETIC ULCER OF LEFT FOOT ASSOCIATED WITH TYPE 1 DIABETES MELLITUS, UNSPECIFIED PART OF FOOT, UNSPECIFIED ULCER STAGE (H): Primary | ICD-10-CM

## 2021-06-24 DIAGNOSIS — I10 ESSENTIAL HYPERTENSION: ICD-10-CM

## 2021-06-24 DIAGNOSIS — M25.572 PAIN IN JOINT INVOLVING ANKLE AND FOOT, LEFT: ICD-10-CM

## 2021-06-24 PROCEDURE — 99309 SBSQ NF CARE MODERATE MDM 30: CPT | Performed by: NURSE PRACTITIONER

## 2021-06-24 ASSESSMENT — ENCOUNTER SYMPTOMS
DIZZINESS: 0
POLYPHAGIA: 0
BACK PAIN: 1
LIGHT-HEADEDNESS: 0
CHILLS: 0
WOUND: 1
POLYDIPSIA: 0
HEADACHES: 0
FEVER: 0

## 2021-06-24 NOTE — PROGRESS NOTES
Subjective:  Patient is seen today in follow-up of diabetic ulcer of left foot, hypertension and type 2 diabetes. She was not supposed to be seen until next week but nursing staff had concerns about her wound. They thought it was not looking as good.  She has had recent management of hypertension with last increase of lisinopril to 30 mg daily. She was due to have basic metabolic panel checked next Tuesday on facility lab day. Blood pressures are still running high. These range from 134//98.  Diabetes control is improving with recent changes to insulin. She currently is receiving Lantus 20 units daily and a log of 4 units 3 times daily with meals in addition to sliding scale insulin. Sugars now range from 105-310.  She is complaining of neuropathic pain at the left leg. This is chronic. She receives gabapentin 300 mg at bedtime. She had requested yesterday to have increased dose in order was given to increase to 300 mg at bedtime and an extra dose of 300 mg gabapentin at 2 PM.  She also tells me that her left ankle hurts. This is been hurting for several months. She injured this when she was at home prior to admission. She states when she stands still hurts. She would really like to have an x-ray. She believes maybe she sprained her ankle and reinjured it when it started hurting about 4 days ago. She states she believes she stepped wrong with transferring. It is chronically swollen but not more than usual. No ecchymosis.      Patient Active Problem List   Diagnosis     Essential hypertension     Hypothyroidism     Episodic mood disorder (H)     Pure hypercholesterolemia     Tobacco use disorder     Esophageal reflux     Allergic rhinitis     Vitamin D deficiency     HYPERLIPIDEMIA LDL GOAL <100     S/P gastric bypass     Malnutrition, unspecified type (H)     Venous stasis of both lower extremities     Diabetic ulcer of left heel associated with type 2 diabetes mellitus, unspecified ulcer stage (H)     Radial  nerve palsy, right     Uncontrolled type 2 diabetes mellitus with hyperglycemia (H)     Past Medical History:   Diagnosis Date     DIABETES MELLITUS TYPE II-UNCOMPL 2006     HYPERTENSION NOS 2006     HYPOTHYROIDISM NOS 2006     Vitamin D deficiencies 2008     No past surgical history on file.  Social History     Socioeconomic History     Marital status:      Spouse name: Not on file     Number of children: Not on file     Years of education: Not on file     Highest education level: Not on file   Occupational History     Not on file   Social Needs     Financial resource strain: Not on file     Food insecurity     Worry: Not on file     Inability: Not on file     Transportation needs     Medical: Not on file     Non-medical: Not on file   Tobacco Use     Smoking status: Current Every Day Smoker     Packs/day: 0.50     Years: 46.00     Pack years: 23.00     Last attempt to quit: 2008     Years since quittin.8     Smokeless tobacco: Never Used     Tobacco comment: down to 5 cigs/ day   Substance and Sexual Activity     Alcohol use: Not Currently     Comment: 1 liter of whiskey per day, last drank at 1100 3/5/2019     Drug use: No     Sexual activity: Not Currently   Lifestyle     Physical activity     Days per week: Not on file     Minutes per session: Not on file     Stress: Not on file   Relationships     Social connections     Talks on phone: Not on file     Gets together: Not on file     Attends [employer] service: Not on file     Active member of club or organization: Not on file     Attends meetings of clubs or organizations: Not on file     Relationship status: Not on file     Intimate partner violence     Fear of current or ex partner: Not on file     Emotionally abused: Not on file     Physically abused: Not on file     Forced sexual activity: Not on file   Other Topics Concern     Parent/sibling w/ CABG, MI or angioplasty before 65F 55M? Not Asked   Social History Narrative      Not on file     Family History   Problem Relation Age of Onset     Hypertension Mother      Diabetes Mother      Breast Cancer Mother      Asthma Father      Diabetes Father      Hypertension Father      Cerebrovascular Disease Father      Circulatory Father      Eye Disorder Father      Colon Cancer Maternal Uncle 50     Codeine, Seasonal allergies, Sulfa drugs, and Vicodin [hydrocodone-acetaminophen]    Skilled Nursing Facility Medication Record reviewed    End of Life Planning:   DNR    Review of Systems:  Review of Systems   Constitutional: Negative for chills and fever.   Cardiovascular: Positive for peripheral edema.   Endocrine: Negative for polydipsia, polyphagia and polyuria.   Musculoskeletal: Positive for back pain.        Left ankle pain, chronic left leg pain   Skin: Positive for wound.   Neurological: Negative for dizziness, syncope, light-headedness and headaches.       Objective:   BP (!) 171/80   Pulse 83   Resp 17   Wt 60.8 kg (134 lb)   LMP 06/26/2008 (LMP Unknown)   SpO2 97%   BMI 20.99 kg/m    Physical Exam  Pleasant female with flat affect today. By the end of the visit she was smiling and in better spirits. Skin color pink. Sclera nonicteric. Lung fields clear to auscultation. Cardiovascular regular. Bilateral extremities with one-2+ edema. Left ankle with pain at inversion and eversion but normal dorsal and plantar flexion. Wound located at the top of the left foot has less slough than previous. No surrounding erythema or warmth. No maceration. Able to see light pink granulation tissue trying to show throughout the wound bed. Measurements obtained through nursing staff and recorded.    Assessment:    ICD-10-CM    1. Diabetic ulcer of left foot associated with type 1 diabetes mellitus, unspecified part of foot, unspecified ulcer stage (H)  E10.621     L97.529    2. Uncontrolled type 2 diabetes mellitus with hyperglycemia (H)  E11.65    3. Essential hypertension  I10        Plan:   1.  Continue with current treatment plan. Wound healing as expected.  2. Increase lisinopril to 40 mg daily. Check basic metabolic panel next lab day. Follow-up with hypertension in 2-3 weeks, sooner if needed. At 2 g sodium restricted diet   3. Increase Lantus to 23 units daily. Continue with mealtime and sliding scale insulin. Monitor for hypoglycemia and report any concerns.  4. Obtain x-ray of left ankle. Appears to have ankle sprain. Recommend evaluation by PT.  I discussed with patient that yesterday I gave a verbal order to increase the gabapentin to 300 mg at 2 PM and continuing 300 mg at bedtime. Explained that we need to slowly titrate up this medication. She will let me know next week if it has been effective.    RAYMUNDO Turner   6/24/2021  9:50 AM

## 2021-06-29 ENCOUNTER — RESULTS ONLY (OUTPATIENT)
Dept: LAB | Age: 61
End: 2021-06-29

## 2021-06-29 LAB
ANION GAP SERPL CALCULATED.3IONS-SCNC: 8 MMOL/L (ref 3–14)
BUN SERPL-MCNC: 35 MG/DL (ref 7–25)
CALCIUM SERPL-MCNC: 9 MG/DL (ref 8.6–10.3)
CHLORIDE SERPL-SCNC: 104 MMOL/L (ref 98–107)
CO2 SERPL-SCNC: 23 MMOL/L (ref 21–31)
CREAT SERPL-MCNC: 0.85 MG/DL (ref 0.6–1.2)
GFR SERPL CREATININE-BSD FRML MDRD: 68 ML/MIN/{1.73_M2}
GLUCOSE SERPL-MCNC: 407 MG/DL (ref 70–105)
POTASSIUM SERPL-SCNC: 4.2 MMOL/L (ref 3.5–5.1)
SODIUM SERPL-SCNC: 135 MMOL/L (ref 134–144)

## 2021-07-01 ENCOUNTER — NURSING HOME VISIT (OUTPATIENT)
Dept: GERIATRICS | Facility: OTHER | Age: 61
End: 2021-07-01
Payer: COMMERCIAL

## 2021-07-01 ENCOUNTER — APPOINTMENT (OUTPATIENT)
Dept: GERIATRICS | Facility: OTHER | Age: 61
End: 2021-07-01
Attending: NURSE PRACTITIONER
Payer: COMMERCIAL

## 2021-07-01 VITALS
TEMPERATURE: 97.3 F | SYSTOLIC BLOOD PRESSURE: 170 MMHG | HEART RATE: 80 BPM | DIASTOLIC BLOOD PRESSURE: 75 MMHG | BODY MASS INDEX: 25.22 KG/M2 | RESPIRATION RATE: 18 BRPM | OXYGEN SATURATION: 94 % | WEIGHT: 161 LBS

## 2021-07-01 DIAGNOSIS — M62.81 GENERALIZED MUSCLE WEAKNESS: ICD-10-CM

## 2021-07-01 DIAGNOSIS — I10 ESSENTIAL HYPERTENSION: ICD-10-CM

## 2021-07-01 DIAGNOSIS — E16.2 HYPOGLYCEMIA: Primary | ICD-10-CM

## 2021-07-01 DIAGNOSIS — G89.29 CHRONIC BILATERAL LOW BACK PAIN WITHOUT SCIATICA: ICD-10-CM

## 2021-07-01 DIAGNOSIS — M54.50 CHRONIC BILATERAL LOW BACK PAIN WITHOUT SCIATICA: ICD-10-CM

## 2021-07-01 DIAGNOSIS — N28.89 RENAL MASS: ICD-10-CM

## 2021-07-01 DIAGNOSIS — R63.5 WEIGHT GAIN: ICD-10-CM

## 2021-07-01 PROCEDURE — 99310 SBSQ NF CARE HIGH MDM 45: CPT | Performed by: NURSE PRACTITIONER

## 2021-07-01 ASSESSMENT — ENCOUNTER SYMPTOMS
POLYPHAGIA: 0
FEVER: 0
SHORTNESS OF BREATH: 0
DIFFICULTY URINATING: 0
HEMATURIA: 0
APPETITE CHANGE: 0
DIARRHEA: 0
POLYDIPSIA: 0
NAUSEA: 0
VOMITING: 0
CONSTIPATION: 0
ABDOMINAL PAIN: 0
WEAKNESS: 1
DYSURIA: 0
FATIGUE: 0
FLANK PAIN: 0

## 2021-07-01 NOTE — PROGRESS NOTES
Subjective:  Patient is seen today for hypoglycemia.  She states over about the last 5 days she has noticed that her blood sugars are dropping low after about 2 PM until 2 AM.  She has been eating all of her meals.  Her lowest blood sugar has been 47.  She does have Dexcom.  Sugars in the morning have been high usually between 200-300.  She is on both long-acting and mealtime insulin.  She is noticed weight gain since admission.  She is eating better here than she was when she was at home.  She does feel that she is retaining fluid.  She is not feeling short of breath.  Her last echocardiogram was December 2020 with normal systolic and diastolic function.  She does receive Lasix 40 mg daily and would like to have this increase.  She also is on gabapentin 300 mg twice daily which could lead to some fluid retention.  By reviewing previous records I do see that she has history of a right renal cell mass.  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  Her last CT scan in July 2020 was stable.  She had another CT scan in January 2020 which showed changes so she was referred to urologist.  She was supposed to make that appointment but being that she has been at skilled nursing facility she has delayed that until after she discharges.  She is hoping to discharge next week and does have a follow-up appointment with her PCP.  She has no previous evidence of renal artery stenosis but blood pressure continues to remain high.  She is on both lisinopril 40 mg daily and furosemide 40 mg daily.  She continues to use wheelchair throughout facility and has been working on using her walker for short distances.  She is hoping to discharge next week and is going to need a walker for when she goes home.    Patient Active Problem List   Diagnosis     Essential hypertension     Hypothyroidism     Episodic mood disorder (H)     Pure hypercholesterolemia     Tobacco use disorder     Esophageal reflux     Allergic  rhinitis     Vitamin D deficiency     HYPERLIPIDEMIA LDL GOAL <100     S/P gastric bypass     Malnutrition, unspecified type (H)     Venous stasis of both lower extremities     Diabetic ulcer of left heel associated with type 2 diabetes mellitus, unspecified ulcer stage (H)     Radial nerve palsy, right     Uncontrolled type 2 diabetes mellitus with hyperglycemia (H)     Past Medical History:   Diagnosis Date     DIABETES MELLITUS TYPE II-UNCOMPL 2006     HYPERTENSION NOS 2006     HYPOTHYROIDISM NOS 2006     Vitamin D deficiencies 2008     No past surgical history on file.  Social History     Socioeconomic History     Marital status:      Spouse name: Not on file     Number of children: Not on file     Years of education: Not on file     Highest education level: Not on file   Occupational History     Not on file   Social Needs     Financial resource strain: Not on file     Food insecurity     Worry: Not on file     Inability: Not on file     Transportation needs     Medical: Not on file     Non-medical: Not on file   Tobacco Use     Smoking status: Current Every Day Smoker     Packs/day: 0.50     Years: 46.00     Pack years: 23.00     Last attempt to quit: 2008     Years since quittin.8     Smokeless tobacco: Never Used     Tobacco comment: down to 5 cigs/ day   Substance and Sexual Activity     Alcohol use: Not Currently     Comment: 1 liter of whiskey per day, last drank at 1100 3/5/2019     Drug use: No     Sexual activity: Not Currently   Lifestyle     Physical activity     Days per week: Not on file     Minutes per session: Not on file     Stress: Not on file   Relationships     Social connections     Talks on phone: Not on file     Gets together: Not on file     Attends [employer] service: Not on file     Active member of club or organization: Not on file     Attends meetings of clubs or organizations: Not on file     Relationship status: Not on file     Intimate partner  violence     Fear of current or ex partner: Not on file     Emotionally abused: Not on file     Physically abused: Not on file     Forced sexual activity: Not on file   Other Topics Concern     Parent/sibling w/ CABG, MI or angioplasty before 65F 55M? Not Asked   Social History Narrative     Not on file     Family History   Problem Relation Age of Onset     Hypertension Mother      Diabetes Mother      Breast Cancer Mother      Asthma Father      Diabetes Father      Hypertension Father      Cerebrovascular Disease Father      Circulatory Father      Eye Disorder Father      Colon Cancer Maternal Uncle 50     Codeine, Seasonal allergies, Sulfa drugs, and Vicodin [hydrocodone-acetaminophen]    Skilled Nursing Facility Medication Record reviewed    End of Life Planning:  DNR    Review of Systems:  Review of Systems   Constitutional: Negative for appetite change, fatigue and fever.   Respiratory: Negative for shortness of breath.    Cardiovascular: Negative for chest pain.   Gastrointestinal: Negative for abdominal pain, constipation, diarrhea, nausea and vomiting.   Endocrine: Negative for polydipsia, polyphagia and polyuria.   Genitourinary: Negative for difficulty urinating, dysuria, flank pain and hematuria.   Neurological: Positive for weakness.       Objective:   BP (!) 170/75   Pulse 80   Temp 97.3  F (36.3  C)   Resp 18   Wt 73 kg (161 lb)   LMP 06/26/2008 (LMP Unknown)   SpO2 94%   BMI 25.22 kg/m    Physical Exam  Pleasant female in no acute distress.  Alert and oriented x4.  Skin color pink.  Sclera nonicteric.  Lung fields with faint rhonchi otherwise clear.  Cardiovascular regular rate and rhythm with no S3 auscultated.  Abdomen is soft and without distention.  Extremities with 1-2+ edema.    Assessment:    ICD-10-CM    1. Hypoglycemia  E16.2    2. Weight gain  R63.5    3. Essential hypertension  I10    4. Renal mass  N28.89    5. Generalized muscle weakness  M62.81    6. Chronic bilateral low back  pain without sciatica  M54.5     G89.29        Plan:   1.  Decrease NovoLog at lunch and supper to 2 units rather than 4 units.  Continue to monitor for hypoglycemia.  She has a follow-up appointment with diabetic nurse manager after discharge.  2.  Will increase furosemide to 40 mg in the morning and 20 mg midday.  Her last echocardiogram showed no evidence of heart failure and February 2020.  She has had recent history of mild protein malnutrition and is on some medications which could lead to fluid retention.  She is going to need further evaluation.  She does have a follow-up appointment with her PCP on July 14 and will discuss weight gain and fluid retention at that time.  Currently she is stable.  Most recent renal function panel last week with BUN of 35, creatinine 0.85, potassium 4.2.  3.  She needs to schedule urology follow-up for renal cell mass and is planning to do at the time of discharge from skilled nursing facility.  4.  Patient will need walker due to generalized weakness and chronic back and ankle pain.  Prescription will be provided to her at discharge evaluation next Wednesday.  Documented note can serve as face-to-face.    RAYMUNDO Turner   7/1/2021  9:32 AM

## 2021-07-02 ENCOUNTER — OFFICE VISIT (OUTPATIENT)
Dept: FAMILY MEDICINE | Facility: OTHER | Age: 61
End: 2021-07-02
Attending: FAMILY MEDICINE
Payer: COMMERCIAL

## 2021-07-02 VITALS
TEMPERATURE: 97.9 F | RESPIRATION RATE: 20 BRPM | WEIGHT: 170.2 LBS | HEART RATE: 85 BPM | SYSTOLIC BLOOD PRESSURE: 110 MMHG | OXYGEN SATURATION: 93 % | DIASTOLIC BLOOD PRESSURE: 60 MMHG | BODY MASS INDEX: 26.66 KG/M2

## 2021-07-02 DIAGNOSIS — N28.89 RENAL MASS: ICD-10-CM

## 2021-07-02 DIAGNOSIS — E16.2 HYPOGLYCEMIA: ICD-10-CM

## 2021-07-02 DIAGNOSIS — R11.0 NAUSEA: ICD-10-CM

## 2021-07-02 DIAGNOSIS — R60.9 EDEMA, UNSPECIFIED TYPE: Primary | ICD-10-CM

## 2021-07-02 DIAGNOSIS — I10 ESSENTIAL HYPERTENSION: ICD-10-CM

## 2021-07-02 LAB
ALBUMIN SERPL-MCNC: 3.4 G/DL (ref 3.5–5.7)
ALP SERPL-CCNC: 109 U/L (ref 34–104)
ALT SERPL W P-5'-P-CCNC: 46 U/L (ref 7–52)
ANION GAP SERPL CALCULATED.3IONS-SCNC: 6 MMOL/L (ref 3–14)
AST SERPL W P-5'-P-CCNC: 53 U/L (ref 13–39)
BASOPHILS # BLD AUTO: 0.1 10E9/L (ref 0–0.2)
BASOPHILS NFR BLD AUTO: 1.1 %
BILIRUB SERPL-MCNC: 0.3 MG/DL (ref 0.3–1)
BUN SERPL-MCNC: 46 MG/DL (ref 7–25)
CALCIUM SERPL-MCNC: 9 MG/DL (ref 8.6–10.3)
CHLORIDE SERPL-SCNC: 106 MMOL/L (ref 98–107)
CO2 SERPL-SCNC: 24 MMOL/L (ref 21–31)
CREAT SERPL-MCNC: 0.9 MG/DL (ref 0.6–1.2)
DIFFERENTIAL METHOD BLD: ABNORMAL
EOSINOPHIL # BLD AUTO: 0.2 10E9/L (ref 0–0.7)
EOSINOPHIL NFR BLD AUTO: 3.4 %
ERYTHROCYTE [DISTWIDTH] IN BLOOD BY AUTOMATED COUNT: 14.1 % (ref 10–15)
GFR SERPL CREATININE-BSD FRML MDRD: 64 ML/MIN/{1.73_M2}
GLUCOSE SERPL-MCNC: 145 MG/DL (ref 70–105)
HCT VFR BLD AUTO: 28.6 % (ref 35–47)
HGB BLD-MCNC: 9.2 G/DL (ref 11.7–15.7)
IMM GRANULOCYTES # BLD: 0 10E9/L (ref 0–0.4)
IMM GRANULOCYTES NFR BLD: 0.3 %
LYMPHOCYTES # BLD AUTO: 1.3 10E9/L (ref 0.8–5.3)
LYMPHOCYTES NFR BLD AUTO: 21.2 %
MCH RBC QN AUTO: 31.6 PG (ref 26.5–33)
MCHC RBC AUTO-ENTMCNC: 32.2 G/DL (ref 31.5–36.5)
MCV RBC AUTO: 98 FL (ref 78–100)
MONOCYTES # BLD AUTO: 0.6 10E9/L (ref 0–1.3)
MONOCYTES NFR BLD AUTO: 9.4 %
NEUTROPHILS # BLD AUTO: 4 10E9/L (ref 1.6–8.3)
NEUTROPHILS NFR BLD AUTO: 64.6 %
NT-PROBNP SERPL-MCNC: 149 PG/ML (ref 0–100)
PLATELET # BLD AUTO: 222 10E9/L (ref 150–450)
POTASSIUM SERPL-SCNC: 4.6 MMOL/L (ref 3.5–5.1)
PREALB SERPL IA-MCNC: 20 MG/DL (ref 17–34)
PROT SERPL-MCNC: 6.2 G/DL (ref 6.4–8.9)
RBC # BLD AUTO: 2.91 10E12/L (ref 3.8–5.2)
SODIUM SERPL-SCNC: 136 MMOL/L (ref 134–144)
WBC # BLD AUTO: 6.1 10E9/L (ref 4–11)

## 2021-07-02 PROCEDURE — 36415 COLL VENOUS BLD VENIPUNCTURE: CPT | Mod: ZL | Performed by: FAMILY MEDICINE

## 2021-07-02 PROCEDURE — 85025 COMPLETE CBC W/AUTO DIFF WBC: CPT | Mod: ZL | Performed by: FAMILY MEDICINE

## 2021-07-02 PROCEDURE — 84134 ASSAY OF PREALBUMIN: CPT | Mod: ZL | Performed by: FAMILY MEDICINE

## 2021-07-02 PROCEDURE — 99214 OFFICE O/P EST MOD 30 MIN: CPT | Performed by: FAMILY MEDICINE

## 2021-07-02 PROCEDURE — G0463 HOSPITAL OUTPT CLINIC VISIT: HCPCS

## 2021-07-02 PROCEDURE — 83880 ASSAY OF NATRIURETIC PEPTIDE: CPT | Mod: ZL | Performed by: FAMILY MEDICINE

## 2021-07-02 PROCEDURE — 80053 COMPREHEN METABOLIC PANEL: CPT | Mod: ZL | Performed by: FAMILY MEDICINE

## 2021-07-02 RX ORDER — PROCHLORPERAZINE 25 MG/1
SUPPOSITORY RECTAL
COMMUNITY
Start: 2021-01-19

## 2021-07-02 ASSESSMENT — ENCOUNTER SYMPTOMS
FEVER: 0
CHILLS: 0
NERVOUS/ANXIOUS: 0
SHORTNESS OF BREATH: 0
COUGH: 0

## 2021-07-02 ASSESSMENT — PAIN SCALES - GENERAL: PAINLEVEL: SEVERE PAIN (7)

## 2021-07-02 NOTE — NURSING NOTE
"Chief Complaint   Patient presents with     Weight Problem     weight gain, HTN, hypoglycemia - nursing home patient    Patient weighed 151 lb yesterday , and weighs 170.2 lb today . She also has questions about lab work .      Initial /60 (BP Location: Right arm, Patient Position: Sitting, Cuff Size: Adult Regular)   Pulse 85   Temp 97.9  F (36.6  C) (Tympanic)   Resp 20   Wt 77.2 kg (170 lb 3.2 oz)   LMP 06/26/2008 (LMP Unknown)   SpO2 93%   BMI 26.66 kg/m   Estimated body mass index is 26.66 kg/m  as calculated from the following:    Height as of 1/27/21: 1.702 m (5' 7\").    Weight as of this encounter: 77.2 kg (170 lb 3.2 oz).  Medication Reconciliation: complete    Soledad Mclean LPN  "

## 2021-07-02 NOTE — PROGRESS NOTES
"  SUBJECTIVE:   Nursing Notes:   Soledad Mclean LPN  7/2/2021  1:46 PM  Sign at exiting of workspace  Chief Complaint   Patient presents with     Weight Problem     weight gain, HTN, hypoglycemia - nursing home patient    Patient weighed 161 lb yesterday , and weighs 170.2 lb today . She also has questions about lab work .      Initial /60 (BP Location: Right arm, Patient Position: Sitting, Cuff Size: Adult Regular)   Pulse 85   Temp 97.9  F (36.6  C) (Tympanic)   Resp 20   Wt 77.2 kg (170 lb 3.2 oz)   LMP 06/26/2008 (LMP Unknown)   SpO2 93%   BMI 26.66 kg/m   Estimated body mass index is 26.66 kg/m  as calculated from the following:    Height as of 1/27/21: 1.702 m (5' 7\").    Weight as of this encounter: 77.2 kg (170 lb 3.2 oz).  Medication Reconciliation: complete    Soledad Mclean LPN       Funmilayo Stratton is a 61 year old female who presents to clinic today for a complaint of weight gain.  She has been urinating a fair amount, but not really more than normal.  She will be seeing her primary care provider in [place] on 7/16/2021.  She has a history of dumping syndrome from prior gastric bypass surgery and has a hard time maintaining her blood sugars.      She had seen Tonja Lopez NP yesterday at [facility] due to hypoglycemia.  Over the past 5 days had been having issues with blood sugars dropping between 2 pm to 2 am.  She had been eating all of her meals.  She has a DEXCOM continuous glucose monitor.  She is on long-acting and mealtime insuline.  She had been having ongoing weight gain.  She had been eating better than when she was at home.  Last echocardiogram was in 12/2020 and showed normal systolic and diastolic function.  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  Last CT angio In 1/11/2021, which showed a right renal cortical mass, concerning for renal cell carcinoma.  There was a question of right renal artery stenosis on the scan " as well. Anasarca and mild ascities were seen at that time as well.  Follow up CT urogram was recommended, which it doesn't appear she has had done yet.  She had a history of alcohol abuse and quit a couple of years ago.  She had been referred to Urology, but has not gone yet.  Her blood pressures have remained elevated.  At yesterday's appointment, her blood pressure was 170/75.  Her lunch time & suppertime novolog yesterday was decreased to 2 units from 4 units.  She is scheduled to follow up with [facility] after her skilled nursing facility discharge.  Her lasix yesterday was increased to 40 mg every am and 20 mg midday.  She has a history of protein malnutrition and is also on medications that could cause retention per yesterday's note.  According to yesterday's recorded weight, she was 161 lb and today is 170.2 lb. She is planning to discharge to home on 7/8/2021.  She has been wearing compression stockings.    HPI    I personally reviewed medications/allergies/history listed below:    Patient Active Problem List    Diagnosis Date Noted     Uncontrolled type 2 diabetes mellitus with hyperglycemia (H) 05/04/2021     Priority: Medium     Radial nerve palsy, right 02/01/2021     Priority: Medium     S/P gastric bypass 12/01/2020     Priority: Medium     Malnutrition, unspecified type (H) 12/01/2020     Priority: Medium     Venous stasis of both lower extremities 12/01/2020     Priority: Medium     Diabetic ulcer of left heel associated with type 2 diabetes mellitus, unspecified ulcer stage (H) 12/01/2020     Priority: Medium     HYPERLIPIDEMIA LDL GOAL <100 05/09/2010     Priority: Medium     Vitamin D deficiency 09/21/2008     Priority: Medium     (Problem list name updated by automated process. Provider to review and confirm.)       Allergic rhinitis 05/06/2008     Priority: Medium     Problem list name updated by automated process. Provider to review       Esophageal reflux 01/21/2008     Priority:  Medium     Tobacco use disorder 11/15/2007     Priority: Medium     Pure hypercholesterolemia 10/17/2006     Priority: Medium     Episodic mood disorder (H) 2006     Priority: Medium     Problem list name updated by automated process. Provider to review       Essential hypertension 2006     Priority: Medium     Problem list name updated by automated process. Provider to review       Hypothyroidism 2006     Priority: Medium     Problem list name updated by automated process. Provider to review       Past Medical History:   Diagnosis Date     DIABETES MELLITUS TYPE II-UNCOMPL 2006     HYPERTENSION NOS 2006     HYPOTHYROIDISM NOS 2006     Vitamin D deficiencies 2008      History reviewed. No pertinent surgical history.  Family History   Problem Relation Age of Onset     Hypertension Mother      Diabetes Mother      Breast Cancer Mother      Asthma Father      Diabetes Father      Hypertension Father      Cerebrovascular Disease Father      Circulatory Father      Eye Disorder Father      Colon Cancer Maternal Uncle 50     Social History     Tobacco Use     Smoking status: Current Every Day Smoker     Packs/day: 0.50     Years: 46.00     Pack years: 23.00     Last attempt to quit: 2008     Years since quittin.8     Smokeless tobacco: Never Used     Tobacco comment: down to 5 cigs/ day   Substance Use Topics     Alcohol use: Not Currently     Comment: 1 liter of whiskey per day, last drank at 1100 3/5/2019     Social History     Social History Narrative     Not on file     Current Outpatient Medications   Medication Sig Dispense Refill     acetaminophen (TYLENOL) 500 MG tablet Take 1,000 mg by mouth as needed        ACETONE, URINE, TEST VI Hospital bed       albuterol (PROAIR HFA/PROVENTIL HFA/VENTOLIN HFA) 108 (90 Base) MCG/ACT inhaler Inhale 2 puffs into the lungs every 4 hours as needed for shortness of breath / dyspnea or wheezing       amoxicillin-clavulanate  (AUGMENTIN) 875-125 MG tablet Take 1 tablet by mouth 2 times daily 20 tablet 0     aspirin 81 MG EC tablet Take 81 mg by mouth daily       BD U/F III SHORT PEN NEEDLE 31G X 8 MM MISC Use QID as directed 100 prn     bisacodyl (DULCOLAX) 5 MG EC tablet Use as directed for colonoscopy prep 2 tablet 0     buPROPion (WELLBUTRIN XL) 150 MG 24 hr tablet Take 1 tablet (150 mg) by mouth every morning 7 tablet 1     buPROPion (WELLBUTRIN XL) 300 MG 24 hr tablet Take 1 tablet (300 mg) by mouth every morning 30 tablet 1     Calcium Carb-Cholecalciferol 500-10 MG-MCG CHEW CHEW AND SWALLOW 1 TABLET BY MOUTH TWICE DAILY       calcium carbonate-vitamin D (OS-BEST) 500-400 MG-UNIT tablet Take 1 tablet by mouth 2 times daily       Continuous Blood Gluc Sensor (DEXCOM G6 SENSOR) MISC Change every 10 days.       Continuous Blood Gluc Sensor (DEXCOM G6 SENSOR) MISC Change every 10 days. 3 each 11     Continuous Blood Gluc Transmit (DEXCOM G6 TRANSMITTER) MISC Change every 3 months.       Continuous Blood Gluc Transmit (DEXCOM G6 TRANSMITTER) MISC Change every 3 months. 1 each 3     DULoxetine (CYMBALTA) 20 MG capsule Take 1 capsule (20 mg) by mouth daily 90 capsule 3     ferrous sulfate (FEROSUL) 325 (65 Fe) MG tablet Take 325 mg by mouth 2 times daily       fish oil-omega-3 fatty acids 1000 MG capsule Take 1 g by mouth daily       fluticasone (FLONASE) 50 MCG/ACT nasal spray Spray 1 spray into both nostrils daily 48 g 3     furosemide (LASIX) 20 MG tablet Take 2 tablets (40 mg) by mouth daily 90 tablet 3     furosemide (LASIX) 40 MG tablet Take 40 mg by mouth every morning       gabapentin (NEURONTIN) 100 MG capsule Take 1 capsule (100 mg) by mouth 3 times daily 90 capsule 3     GLUCAGON EMERGENCY 1 MG kit INJECT 1 MG 1 TIME as NEEDED FOR BLOOD GLUCOSE <60MG/DL       insulin aspart (NOVOLOG PEN) 100 UNIT/ML pen Inject using ICR 1 unit per 15 grams carb TID AC + correction scale, max daily dose 30 units. 15 mL 3     insulin glargine  (LANTUS PEN) 100 UNIT/ML pen Inject 8 Units Subcutaneous every morning 15 mL 3     LACTOBACILLUS PO Give 1 capsule QD       lamoTRIgine (LAMICTAL) 100 MG tablet TAKE 1 TAB BY MOUTH TWICE A DAY       lamoTRIgine (LAMICTAL) 200 MG tablet Take 1 tablet (200 mg) by mouth At Bedtime 30 tablet 4     lamoTRIgine (LAMICTAL) 200 MG tablet Take 1 tablet (200 mg) by mouth At Bedtime 30 tablet 4     levothyroxine (SYNTHROID/LEVOTHROID) 125 MCG tablet Take 1 tablet (125 mcg) by mouth every morning 90 tablet 3     lisinopril (ZESTRIL) 10 MG tablet TAKE 1 TAB BY MOUTH ONCE DAILY       lisinopril (ZESTRIL) 20 MG tablet Take 0.5 tablets (10 mg) by mouth daily 90 tablet 3     mirtazapine (REMERON) 45 MG tablet Take 1 tablet (45 mg) by mouth At Bedtime 30 tablet 4     mirtazapine (REMERON) 45 MG tablet TAKE 1 TAB BY MOUTH AT BEDTIME       [facility] Devices (HOME STYLE BED RAILS) MISC Bed Rails       MULTIVITAMIN/IRON OR TABS take one daily 1 0     naltrexone (DEPADE/REVIA) 50 MG tablet Take 50 mg by mouth every morning       omeprazole (PRILOSEC) 40 MG DR capsule Take 1 capsule (40 mg) by mouth 2 times daily 90 capsule 3     ondansetron (ZOFRAN) 4 MG tablet TAKE 1 TABLET BY MOUTH EVERY 6 HOURS AS NEEDED FOR NAUSEA       ONE TOUCH ULTRA BONUS PACK STRP   VI Test 3-4 times each day 1 month until 2/09     ONETOUCH ULTRASOFT LANCETS MISC use 3-4 per day 1 box prn     polyethylene glycol (MIRALAX) 17 GM/Dose powder Mix with 255g with 64 oz of Gatorade (not red or purple) drink at a rate of 8oz every 10 min as directed for colonoscopy prep 510 g 0     potassium chloride ER (KLOR-CON M) 20 MEQ CR tablet Take 20 mEq by mouth daily        QUEtiapine (SEROQUEL) 100 MG tablet Take 1.5 tablets (150 mg) by mouth At Bedtime 45 tablet 1     QUEtiapine (SEROQUEL) 50 MG tablet Take 100 mg by mouth At Bedtime       rivaroxaban ANTICOAGULANT (XARELTO ANTICOAGULANT) 10 MG TABS tablet Take 1 tablet (10 mg) by mouth 2 times daily (with meals) 20 tablet 0      rivaroxaban ANTICOAGULANT (XARELTO ANTICOAGULANT) 20 MG TABS tablet Take 1 tablet (20 mg) by mouth daily (with dinner) 90 tablet 3     Skin Protectants, Misc. (EUCERIN) cream Apply topically as needed for dry skin       topiramate (TOPAMAX) 25 MG tablet Take 1 tablet (25 mg) by mouth At Bedtime 30 tablet 1     topiramate (TOPAMAX) 50 MG tablet Take 1 tablet (50 mg) by mouth At Bedtime 30 tablet 4     UNABLE TO FIND MEDICATION NAME: Maalox, Mylanta, or generic equivalent as needed       valACYclovir (VALTREX) 500 MG tablet Take 500 mg by mouth daily       vitamin D3 (CHOLECALCIFEROL) 2000 units tablet Take 1 tablet by mouth daily       rivaroxaban ANTICOAGULANT (XARELTO) 15 MG TABS tablet Take 1 tablet (15 mg) by mouth 2 times daily (with meals) for 13 days 26 tablet 0     Allergies   Allergen Reactions     Codeine      Seasonal Allergies      Sulfa Drugs Rash     Vicodin [Hydrocodone-Acetaminophen] Rash       Review of Systems   Constitutional: Negative for chills and fever.   Respiratory: Negative for cough and shortness of breath.    Cardiovascular: Negative for peripheral edema.   Psychiatric/Behavioral: Negative for mood changes. The patient is not nervous/anxious.         OBJECTIVE:     /60 (BP Location: Right arm, Patient Position: Sitting, Cuff Size: Adult Regular)   Pulse 85   Temp 97.9  F (36.6  C) (Tympanic)   Resp 20   Wt 77.2 kg (170 lb 3.2 oz)   LMP 06/26/2008 (LMP Unknown)   SpO2 93%   BMI 26.66 kg/m    Body mass index is 26.66 kg/m .  Physical Exam  Constitutional:       Appearance: Normal appearance.   HENT:      Head: Normocephalic.   Eyes:      Extraocular Movements: Extraocular movements intact.      Pupils: Pupils are equal, round, and reactive to light.   Neck:      Musculoskeletal: Normal range of motion and neck supple.   Cardiovascular:      Rate and Rhythm: Normal rate and regular rhythm.      Pulses: Normal pulses.      Heart sounds: Normal heart sounds. No murmur.   Pulmonary:       Effort: Pulmonary effort is normal.      Breath sounds: Normal breath sounds. No wheezing, rhonchi or rales.   Abdominal:      General: Abdomen is flat. Bowel sounds are normal. There is distension (no definite fluid wave is seen.).      Tenderness: There is no abdominal tenderness. There is no guarding or rebound.   Musculoskeletal:      Right lower leg: Edema (2+ edema to her knees) present.      Left lower leg: Edema (2+ edema to her knees) present.   Neurological:      Mental Status: She is alert.   Psychiatric:         Mood and Affect: Mood normal.         Behavior: Behavior normal.           PHQ-9 SCORE 1/14/2021 1/28/2021   PHQ-9 Total Score 16 13       PHQ-2 Score:     PHQ-2 ( 1999 Pf[employer]) 7/2/2021   Q1: Little interest or pleasure in doing things 0   Q2: Feeling down, depressed or hopeless 0   PHQ-2 Score 0         I personally reviewed results withpatient as listed below:   Diagnostic Test Results:  none     ASSESSMENT/PLAN:       ICD-10-CM    1. Edema, unspecified type  R60.9 Comprehensive metabolic panel     CBC with platelets differential     Brain Natriuretic Peptide (BNP)     Prealbumin     Prealbumin     Brain Natriuretic Peptide (BNP)     CBC with platelets differential     Comprehensive metabolic panel   2. Renal mass  N28.89 Adult Urology Referral     CT Urogram wo & w Contrast     CANCELED: CT Abdomen Pelvis w/o Contrast   3. Nausea  R11.0 CT Urogram wo & w Contrast     CANCELED: CT Abdomen Pelvis w/o Contrast   4. Essential hypertension  I10 Comprehensive metabolic panel     Comprehensive metabolic panel   5. Hypoglycemia  E16.2        1.  As above, she had an echocardiogram in the recent past with was fairly normal.  She has had malnutrition which could be contributing.  Prealbumin repeated.  She has a history of alcohol abuse, finding of ascites on her last CT and evidence of liver function abnormalities previously.  Chronic liver disease could be contributing to this as well.  Labs  completed as above.  She has been wearing compression stockings, which I encouraged her to continue.  Since she just had her dose of lasix increased yesterday, I did not make any changes at this time.  She is going to be discharging to home and will be seeing her primary care provider in [place] and will follow up at that time.  2.  CT urogram ordered as previously recommended in January.  Also referred to Urology.  3.  Differential diagnosis is long.  May be related to distention of abdomen.  CT ordered as above to evaluate for any progression of renal lesion that could be contributing.  May be related to prior bariatric disease, gastritis, dumping syndrome, among others.  Evaluation with labs and CT as noted.  4.  Blood pressure stable today.  5.  Her dose of novolog was just decreased yesterday, so again no further changes are made.  While she was here today, she had a low sugar of 40 according to her dexcom.  She was given a snack and juice, which helped bring her level back up.  Continue further decrease of her insulin dose per her primary care provider as needed.    Alisa Blount MD  [facility]

## 2021-07-06 ENCOUNTER — TELEPHONE (OUTPATIENT)
Dept: PEDIATRICS | Facility: OTHER | Age: 61
End: 2021-07-06

## 2021-07-06 ENCOUNTER — NURSE TRIAGE (OUTPATIENT)
Dept: PEDIATRICS | Facility: OTHER | Age: 61
End: 2021-07-06

## 2021-07-06 NOTE — TELEPHONE ENCOUNTER
Writer attempted to call [facility] with message. Was on hold for 10 minutes. Will need to recall.  Allie Miller LPN on 7/6/2021 at 12:20 PM

## 2021-07-06 NOTE — TELEPHONE ENCOUNTER
They should notify her PCP, Abhinav Sales    Signed, Abiel Castañeda MD, FAAP, FACP  Internal Medicine & Pediatrics

## 2021-07-06 NOTE — TELEPHONE ENCOUNTER
Calling to report that pt's blood sugar was reading above 600 this morning and now after insulin it is reading 348.      Yehuda Guerrero on 7/6/2021 at 9:59 AM

## 2021-07-07 ENCOUNTER — NURSING HOME VISIT (OUTPATIENT)
Dept: GERIATRICS | Facility: OTHER | Age: 61
End: 2021-07-07
Payer: COMMERCIAL

## 2021-07-07 VITALS
WEIGHT: 177 LBS | HEART RATE: 88 BPM | TEMPERATURE: 97.6 F | RESPIRATION RATE: 18 BRPM | OXYGEN SATURATION: 96 % | BODY MASS INDEX: 27.72 KG/M2 | SYSTOLIC BLOOD PRESSURE: 185 MMHG | DIASTOLIC BLOOD PRESSURE: 99 MMHG

## 2021-07-07 DIAGNOSIS — R63.5 WEIGHT GAIN: Primary | ICD-10-CM

## 2021-07-07 DIAGNOSIS — E11.8 CONTROLLED TYPE 2 DIABETES MELLITUS WITH COMPLICATION, WITH LONG-TERM CURRENT USE OF INSULIN (H): ICD-10-CM

## 2021-07-07 DIAGNOSIS — I10 ESSENTIAL HYPERTENSION: ICD-10-CM

## 2021-07-07 DIAGNOSIS — Z79.4 CONTROLLED TYPE 2 DIABETES MELLITUS WITH COMPLICATION, WITH LONG-TERM CURRENT USE OF INSULIN (H): ICD-10-CM

## 2021-07-07 DIAGNOSIS — N28.89 RENAL MASS: ICD-10-CM

## 2021-07-07 PROCEDURE — 99316 NF DSCHRG MGMT 30 MIN+: CPT | Performed by: NURSE PRACTITIONER

## 2021-07-07 ASSESSMENT — ENCOUNTER SYMPTOMS
HEMATOCHEZIA: 0
FLANK PAIN: 0
ABDOMINAL PAIN: 0
CONSTIPATION: 0
ACTIVITY CHANGE: 0
UNEXPECTED WEIGHT CHANGE: 1
CHEST TIGHTNESS: 0
DIFFICULTY URINATING: 0
LIGHT-HEADEDNESS: 0
WOUND: 1
DIAPHORESIS: 0
APPETITE CHANGE: 0
ABDOMINAL DISTENTION: 1
DIZZINESS: 0
TROUBLE SWALLOWING: 0
ANAL BLEEDING: 0
COUGH: 0
DYSPHORIC MOOD: 0
BACK PAIN: 0
FATIGUE: 0
WEAKNESS: 0
VOMITING: 0
FEVER: 0
HEMATURIA: 0
SHORTNESS OF BREATH: 0
DIARRHEA: 0
NAUSEA: 0

## 2021-07-07 NOTE — PROGRESS NOTES
Subjective:  Patient is seen today in follow-up.  She is planning to discharge home this tomorrow.  She is adamant to still discharge tomorrow back home with her daughter.  Nursing staff are concerned because her weight is up another 7 pounds since July 2.  On July 2, she was seen in clinic by Dr. Leonela Wong and weight was 170 pound.    When she was seen in clinic last week CT urogram and urology consult was ordered.  CT urogram has been set up for this Friday at [facility].  Patient has history of renal cell mass that had previously been biopsied and noncancerous but at most recent CT abdomen in January 2021 there was concerns for renal cell carcinoma.  She was supposed to follow-up with urology and had an appointment in April 2021 but she was admitted to skilled nursing facility and she called and canceled her urology appointment.  She states she was waiting to see the urologist after she was discharged from skilled nursing facility.  She now has a follow-up urology appointment scheduled with Dr. Beckwith.  She is concerned that her weight is up again.  It is up another 6 pounds overnight but a total of 7 pounds since clinic visit on July 2.  She would like additional medication for diuresis.  She is adamant to discharge home tomorrow.  She tells me her daughter is there and can take care of her.  She will have home care services for skilled nursing care to manage her wound and her daughter is her PCA.  She feels strong enough to discharge home.  She can ambulate independently with a wheeled walker and do her own transfers and ADLs.  She states her belly feels more bloated but denies abdominal pain and shortness of breath.  She also has poorly controlled type 2 diabetes.  Insulin dose was decreased last week by this provider secondary to low blood sugars.  She does have Dexcom.  By reviewing blood sugars over the past 48 hours that have been recorded her sugars are ranging from 1 .  Her sugars have  trended down today and the blood sugar of 600 was related to something that she ate, she believes she had cake.  She is receiving her prescribed insulin including long-acting, mealtime and sliding scale.  She has an appointment with diabetic nurse educator next week and feels comfortable contacting her by phone with any concerns.  Her blood pressures also have been elevated.  It was normal in clinic last week so no additional medication was added.  Patient does have previous history of alcohol abuse with ascites noted on last CT in addition to history of gastric bypass and malnutrition.  She had lab work completed on July 2 when she was in the clinic which showed hemoglobin at 9.2 g/dL, platelets 222,  BUN 46, creatinine 0.9, albumin 3.4, total protein 6.2, alkaline phosphatase 109, ALT 46, AST 53 and normal sodium and potassium levels.  The natruretic peptide was 149 and in December 2020 she had an unremarkable echocardiogram.  Patient does receive Lasix 60 mg daily.    Patient Active Problem List   Diagnosis     Essential hypertension     Hypothyroidism     Episodic mood disorder (H)     Pure hypercholesterolemia     Tobacco use disorder     Esophageal reflux     Allergic rhinitis     Vitamin D deficiency     HYPERLIPIDEMIA LDL GOAL <100     S/P gastric bypass     Malnutrition, unspecified type (H)     Venous stasis of both lower extremities     Diabetic ulcer of left heel associated with type 2 diabetes mellitus, unspecified ulcer stage (H)     Radial nerve palsy, right     Uncontrolled type 2 diabetes mellitus with hyperglycemia (H)     Past Medical History:   Diagnosis Date     DIABETES MELLITUS TYPE II-UNCOMPL 6/14/2006     HYPERTENSION NOS 6/14/2006     HYPOTHYROIDISM NOS 6/14/2006     Vitamin D deficiencies 9/21/2008     No past surgical history on file.  Social History     Socioeconomic History     Marital status:      Spouse name: Not on file     Number of children: Not on file     Years of  education: Not on file     Highest education level: Not on file   Occupational History     Not on file   Social Needs     Financial resource strain: Not on file     Food insecurity     Worry: Not on file     Inability: Not on file     Transportation needs     Medical: Not on file     Non-medical: Not on file   Tobacco Use     Smoking status: Current Every Day Smoker     Packs/day: 0.50     Years: 46.00     Pack years: 23.00     Last attempt to quit: 2008     Years since quittin.8     Smokeless tobacco: Never Used     Tobacco comment: down to 5 cigs/ day   Substance and Sexual Activity     Alcohol use: Not Currently     Comment: 1 liter of whiskey per day, last drank at 1100 3/5/2019     Drug use: No     Sexual activity: Not Currently   Lifestyle     Physical activity     Days per week: Not on file     Minutes per session: Not on file     Stress: Not on file   Relationships     Social connections     Talks on phone: Not on file     Gets together: Not on file     Attends Scientologist service: Not on file     Active member of club or organization: Not on file     Attends meetings of clubs or organizations: Not on file     Relationship status: Not on file     Intimate partner violence     Fear of current or ex partner: Not on file     Emotionally abused: Not on file     Physically abused: Not on file     Forced sexual activity: Not on file   Other Topics Concern     Parent/sibling w/ CABG, MI or angioplasty before 65F 55M? Not Asked   Social History Narrative     Not on file     Family History   Problem Relation Age of Onset     Hypertension Mother      Diabetes Mother      Breast Cancer Mother      Asthma Father      Diabetes Father      Hypertension Father      Cerebrovascular Disease Father      Circulatory Father      Eye Disorder Father      Colon Cancer Maternal Uncle 50     Codeine, Seasonal allergies, Sulfa drugs, and Vicodin [hydrocodone-acetaminophen]    Skilled Nursing Facility Medication Record  reviewed    End of Life Planning:   DNR/DNI    Review of Systems:  Review of Systems   Constitutional: Positive for unexpected weight change. Negative for activity change, appetite change, diaphoresis, fatigue and fever.   HENT: Negative for trouble swallowing.    Respiratory: Negative for cough, chest tightness and shortness of breath.    Cardiovascular: Positive for peripheral edema. Negative for chest pain.   Gastrointestinal: Positive for abdominal distention. Negative for abdominal pain, anal bleeding, constipation, diarrhea, hematochezia, nausea and vomiting.   Endocrine: Negative for cold intolerance and heat intolerance.   Genitourinary: Negative for decreased urine volume, difficulty urinating, flank pain and hematuria.   Musculoskeletal: Negative for back pain.   Skin: Positive for wound.   Neurological: Negative for dizziness, syncope, weakness and light-headedness.   Psychiatric/Behavioral: Negative for dysphoric mood.       Objective:   BP (!) 185/99   Pulse 88   Temp 97.6  F (36.4  C)   Resp 18   Wt 80.3 kg (177 lb)   LMP 06/26/2008 (LMP Unknown)   SpO2 96%   BMI 27.72 kg/m    Physical Exam  Pleasant female lying in bed usual state of health.  Affect normal.  Alert and oriented x4.  Skin color pink.  Sclera nonicteric.  Neck supple without adenopathy.  Lung fields clear to auscultation.  Cardiovascular regular rate and rhythm with no S3 auscultated.  3+ JVD and positive HJR.  Abdomen soft and obese.  No palpable organomegaly.  Abdomen mildly distended but not taut and difficult to determine if there is ascites present.  No suprapubic tenderness.  Extremities with 2+ pitting edema to mid thighs.      Assessment:    ICD-10-CM    1. Weight gain  R63.5    2. Essential hypertension  I10    3. Renal mass  N28.89    4. Controlled type 2 diabetes mellitus with complication, with long-term current use of insulin (H)  E11.8     Z79.4        Plan:   I offered to have patient continue to stay at skilled  nursing facility with elevated blood pressures and rapid weight gain.  She otherwise is asymptomatic of abdominal pain, shortness of breath, PND, orthopnea, etc.  No previous history of congestive heart failure.  Most recent BNP normal.  She has past history of alcohol abuse and abdominal ascites, current mild malnutrition and renal mass needing further evaluation.  She is requesting to have diuretics adjusted before discharge.  Explained without knowing source of cause of edema difficult to make adjustments in diuretics and she really has had no improvement with diuresis with up titrating her Lasix.  She does not have history of needing spironolactone in past for ascites.  After further discussion decided to try  metolazone 2.5 mg today only to see if this will help with overnight diuresis.  This hopefully will help with elevated blood pressure.  She will not be discharged on the metolazone but can discuss with her PCP if she thought it was beneficial.  She is going to follow a 2 g sodium restricted diet.  She is going to weigh herself daily and follow-up with her PCP on her weights.  If she gains more than 3 pounds overnight or 5 pounds in 1 week the needs to be evaluated sooner.  She has an appointment to see PCP next week.  She has an appointment set up for urology.  She currently has CT [facility]rogram scheduled at [facility] but [facility]  Discussed with patient that if weight continues to increase or she develops abdominal distention, abdominal pain, shortness of breath, PND, orthopnea, difficulty urinating, etc. she needs to be seen urgently.  She is discharging home with her daughter and will need skilled nursing services for wound care in regards to diabetic ulcer.  Otherwise continue with same medication and treatment plan.    RAYMUNDO Turner   7/7/2021  1:35 PM

## 2021-07-07 NOTE — TELEPHONE ENCOUNTER
START taking Macrobid for UTI  Push fluids and rest  For back: START stretching, ice / alternating with heat.      This writer called and spoke to a nurse at [facility] regarding patient and relayed message from below to her. She stated that when a person from out of town gets admitted to their facility they have a Primary that GI picks for them. If you have any questions regarding this you can contact Lisa LEUNG at [facility].  Emely Carballo LPN on 7/7/2021 at 2:24 PM

## 2021-09-25 ENCOUNTER — HEALTH MAINTENANCE LETTER (OUTPATIENT)
Age: 61
End: 2021-09-25

## 2021-10-11 ENCOUNTER — PATIENT OUTREACH (OUTPATIENT)
Dept: CARE COORDINATION | Facility: CLINIC | Age: 61
End: 2021-10-11

## 2021-10-11 NOTE — PROGRESS NOTES
Clinic Care Coordination Contact    Situation: Patient chart reviewed by care coordinator.    Background: [facility] referral received. Reviewed patient chart and found she had a temporary placement at a local SNF.  She is now back in her home area [place] services ([place]    Plan/Recommendations: Talked with RN Care coordinator, Ruthy, at [facility], [place]. She will look into whether patient may need assistance.    Kamilla Chaney RN on 10/11/2021 at 2:22 PM

## 2021-11-10 NOTE — TELEPHONE ENCOUNTER
[facility] fax received for refill on FLonase.    Per system PCP Abhinav Jones.  rx denied and pharmacy contacted.  Liza Chi RN on 11/10/2021 at 1:29 PM

## 2021-12-10 NOTE — TELEPHONE ENCOUNTER
Needs refill on [facility] [place] [facility]   Glucagon 1mg emergency kit.    Heather Simmons LPN........................12/10/2021  9:37 AM

## 2021-12-14 RX ORDER — IBUPROFEN 600 MG/1
TABLET ORAL
OUTPATIENT
Start: 2021-12-14

## 2021-12-14 NOTE — TELEPHONE ENCOUNTER
Disp Refills Start End [facility]   GLUCAGON EMERGENCY 1 MG kit   10/31/2020  Yes   Sig: INJECT 1 MG 1 TIME as NEEDED FOR BLOOD GLUCOSE <60MG/DL   Class: Historical        Request denied.  Medication is reported/historical  Request should be sent to PCP. Vianey Travis RN  ....................  12/14/2021   12:09 PM

## 2022-01-15 ENCOUNTER — HEALTH MAINTENANCE LETTER (OUTPATIENT)
Age: 62
End: 2022-01-15

## 2022-05-07 ENCOUNTER — HEALTH MAINTENANCE LETTER (OUTPATIENT)
Age: 62
End: 2022-05-07

## 2022-08-27 ENCOUNTER — HEALTH MAINTENANCE LETTER (OUTPATIENT)
Age: 62
End: 2022-08-27

## 2022-12-26 ENCOUNTER — HEALTH MAINTENANCE LETTER (OUTPATIENT)
Age: 62
End: 2022-12-26

## 2023-04-22 ENCOUNTER — HEALTH MAINTENANCE LETTER (OUTPATIENT)
Age: 63
End: 2023-04-22

## 2023-07-09 ENCOUNTER — HEALTH MAINTENANCE LETTER (OUTPATIENT)
Age: 63
End: 2023-07-09

## 2023-09-17 ENCOUNTER — HEALTH MAINTENANCE LETTER (OUTPATIENT)
Age: 63
End: 2023-09-17

## 2024-02-04 ENCOUNTER — HEALTH MAINTENANCE LETTER (OUTPATIENT)
Age: 64
End: 2024-02-04

## 2024-06-23 ENCOUNTER — HEALTH MAINTENANCE LETTER (OUTPATIENT)
Age: 64
End: 2024-06-23

## (undated) RX ORDER — SODIUM CHLORIDE 450 MG/100ML
INJECTION, SOLUTION INTRAVENOUS
Status: DISPENSED
Start: 2021-06-06

## (undated) RX ORDER — SODIUM CHLORIDE 9 MG/ML
INJECTION, SOLUTION INTRAVENOUS
Status: DISPENSED
Start: 2019-03-06

## (undated) RX ORDER — SODIUM CHLORIDE 9 MG/ML
INJECTION, SOLUTION INTRAVENOUS
Status: DISPENSED
Start: 2021-06-06

## (undated) RX ORDER — HYDROMORPHONE HYDROCHLORIDE 1 MG/ML
INJECTION, SOLUTION INTRAMUSCULAR; INTRAVENOUS; SUBCUTANEOUS
Status: DISPENSED
Start: 2020-12-04

## (undated) RX ORDER — SODIUM CHLORIDE 9 MG/ML
INJECTION, SOLUTION INTRAVENOUS
Status: DISPENSED
Start: 2021-01-27

## (undated) RX ORDER — DEXTROSE MONOHYDRATE 25 G/50ML
INJECTION, SOLUTION INTRAVENOUS
Status: DISPENSED
Start: 2021-01-27

## (undated) RX ORDER — OXYCODONE HYDROCHLORIDE 5 MG/1
TABLET ORAL
Status: DISPENSED
Start: 2020-12-04